# Patient Record
Sex: MALE | Race: WHITE | NOT HISPANIC OR LATINO | Employment: OTHER | URBAN - METROPOLITAN AREA
[De-identification: names, ages, dates, MRNs, and addresses within clinical notes are randomized per-mention and may not be internally consistent; named-entity substitution may affect disease eponyms.]

---

## 2018-01-25 ENCOUNTER — TELEPHONE (OUTPATIENT)
Dept: SMOKING CESSATION | Facility: CLINIC | Age: 78
End: 2018-01-25

## 2018-01-25 NOTE — TELEPHONE ENCOUNTER
Called patient to offer services through [facility]. Pt states he is not interested.

## 2018-04-25 PROBLEM — I25.2 HX OF MYOCARDIAL INFARCTION: Status: ACTIVE | Noted: 2018-04-25

## 2019-09-23 ENCOUNTER — TELEPHONE (OUTPATIENT)
Dept: VASCULAR SURGERY | Facility: CLINIC | Age: 79
End: 2019-09-23

## 2019-09-23 DIAGNOSIS — I71.40 ABDOMINAL AORTIC ANEURYSM (AAA) WITHOUT RUPTURE: Primary | ICD-10-CM

## 2019-09-23 NOTE — TELEPHONE ENCOUNTER
"Spoke with Mr Whitaker states " he will call me back tomorrow to reschedule because 10/9/2019 appointment is not a good day for him."  "

## 2019-09-23 NOTE — TELEPHONE ENCOUNTER
----- Message from Dianne Guillen sent at 9/23/2019  2:51 PM CDT -----  Pt calling to reschedule his appts.    [phone]

## 2019-09-24 ENCOUNTER — TELEPHONE (OUTPATIENT)
Dept: VASCULAR SURGERY | Facility: CLINIC | Age: 79
End: 2019-09-24

## 2019-09-24 NOTE — TELEPHONE ENCOUNTER
Spoke with patient addressed previous message. Patient will keep original schedule appointment on 10/9/2019.

## 2019-09-24 NOTE — TELEPHONE ENCOUNTER
----- Message from Dianne Guillen sent at 9/24/2019  8:51 AM CDT -----  Pt calling to reschedule his appts.    [phone]

## 2019-09-24 NOTE — TELEPHONE ENCOUNTER
----- Message from Nichole Friedman sent at 9/24/2019  8:49 AM CDT -----  Contact: self [phone]  Pt is calling to speak to Lisa regarding scheduling an appt    Contact: self [phone]

## 2019-10-09 ENCOUNTER — OFFICE VISIT (OUTPATIENT)
Dept: VASCULAR SURGERY | Facility: CLINIC | Age: 79
End: 2019-10-09
Payer: MEDICARE

## 2019-10-09 ENCOUNTER — HOSPITAL ENCOUNTER (OUTPATIENT)
Dept: VASCULAR SURGERY | Facility: CLINIC | Age: 79
Discharge: HOME | End: 2019-10-09
Attending: SURGERY
Payer: MEDICARE

## 2019-10-09 VITALS
WEIGHT: 137 LBS | DIASTOLIC BLOOD PRESSURE: 57 MMHG | SYSTOLIC BLOOD PRESSURE: 121 MMHG | HEIGHT: 70 IN | TEMPERATURE: 98 F | HEART RATE: 52 BPM | BODY MASS INDEX: 19.61 KG/M2

## 2019-10-09 DIAGNOSIS — I71.40 AAA (ABDOMINAL AORTIC ANEURYSM) WITHOUT RUPTURE: Primary | ICD-10-CM

## 2019-10-09 DIAGNOSIS — I71.40 ABDOMINAL AORTIC ANEURYSM (AAA) WITHOUT RUPTURE: ICD-10-CM

## 2019-10-09 PROCEDURE — 3078F DIAST BP <80 MM HG: CPT | Mod: CPTII,S$GLB,, | Performed by: SURGERY

## 2019-10-09 PROCEDURE — 3074F SYST BP LT 130 MM HG: CPT | Mod: CPTII,S$GLB,, | Performed by: SURGERY

## 2019-10-09 PROCEDURE — 99204 OFFICE O/P NEW MOD 45 MIN: CPT | Mod: S$GLB,,, | Performed by: SURGERY

## 2019-10-09 PROCEDURE — 99999 PR PBB SHADOW E&M-EST. PATIENT-LVL III: CPT | Mod: PBBFAC,,, | Performed by: SURGERY

## 2019-10-09 PROCEDURE — 93978 PR DUPLEX LARGE VESSEL(S),COMPLETE: ICD-10-PCS | Mod: S$GLB,,, | Performed by: SURGERY

## 2019-10-09 PROCEDURE — 1101F PR PT FALLS ASSESS DOC 0-1 FALLS W/OUT INJ PAST YR: ICD-10-PCS | Mod: CPTII,S$GLB,, | Performed by: SURGERY

## 2019-10-09 PROCEDURE — 1101F PT FALLS ASSESS-DOCD LE1/YR: CPT | Mod: CPTII,S$GLB,, | Performed by: SURGERY

## 2019-10-09 PROCEDURE — 3078F PR MOST RECENT DIASTOLIC BLOOD PRESSURE < 80 MM HG: ICD-10-PCS | Mod: CPTII,S$GLB,, | Performed by: SURGERY

## 2019-10-09 PROCEDURE — 99999 PR PBB SHADOW E&M-EST. PATIENT-LVL III: ICD-10-PCS | Mod: PBBFAC,,, | Performed by: SURGERY

## 2019-10-09 PROCEDURE — 99204 PR OFFICE/OUTPT VISIT, NEW, LEVL IV, 45-59 MIN: ICD-10-PCS | Mod: S$GLB,,, | Performed by: SURGERY

## 2019-10-09 PROCEDURE — 3074F PR MOST RECENT SYSTOLIC BLOOD PRESSURE < 130 MM HG: ICD-10-PCS | Mod: CPTII,S$GLB,, | Performed by: SURGERY

## 2019-10-09 PROCEDURE — 93978 VASCULAR STUDY: CPT | Mod: S$GLB,,, | Performed by: SURGERY

## 2019-10-09 NOTE — PROGRESS NOTES
Martell Whitaker  10/09/2019    HPI:  Patient is a 78 y.o. male with a h/o HTN, HLD, COPD, active tobacco use, CAD s/p stent placement, AAA s/p repair in 2007 ([facility], he does not recall surgeon and is unclear on any specifics about suregry) who is here today for evaluation of an aortic aneurysm found on routine screening. He is asymptomatic.     No significant claudication - but does note L > R L lateral thigh discomfort with walking  Able to walk 1 flight of stairs, no angina or CABALLERO    He takes ASA, plavix and a statin    MI about 30 years ago s/p PCI  No stroke  Tobacco use: Current smoker, 3/4-1ppd for 65 years  FHx for aneurysmal disease: negative    PMD is Dr YOEL Snyder, who asked me to see him    Past Medical History:   Diagnosis Date    AAA (abdominal aortic aneurysm) without rupture     pt with h/o aaa     COPD (chronic obstructive pulmonary disease)     Coronary artery disease     Enlarged prostate     History of bladder cancer     Hyperlipidemia     Hypertension     Lung abnormality     spot on lung under care of [facility]monologist at [facility] Dr lua    Myocardial infarct 1988    Urine retention      Past Surgical History:   Procedure Laterality Date    ABDOMINAL AORTIC ANEURYSM REPAIR  2007    BLADDER SURGERY      CORONARY STENT PLACEMENT      FRACTURE SURGERY      arm    PROSTATE SURGERY      TONSILLECTOMY       Family History   Problem Relation Age of Onset    Cancer Father         leukemia     Social History     Socioeconomic History    Marital status:      Spouse name: Not on file    Number of children: Not on file    Years of education: Not on file    Highest education level: Not on file   Occupational History    Occupation: retired    Social Needs    Financial resource strain: Not on file    Food insecurity:     Worry: Not on file     Inability: Not on file    Transportation needs:     Medical: Not on file     Non-medical: Not on file   Tobacco Use    Smoking status:  Current Every Day Smoker     Packs/day: 0.25     Years: 60.00     Pack years: 15.00     Types: Cigarettes    Smokeless tobacco: Never Used   Substance and Sexual Activity    Alcohol use: Yes     Alcohol/week: 1.0 standard drinks     Types: 1 Shots of liquor per week     Comment:  five drink per week     Drug use: No    Sexual activity: Yes     Partners: Female     Birth control/protection: None   Lifestyle    Physical activity:     Days per week: Not on file     Minutes per session: Not on file    Stress: Not on file   Relationships    Social connections:     Talks on phone: Not on file     Gets together: Not on file     Attends Denominational service: Not on file     Active member of club or organization: Not on file     Attends meetings of clubs or organizations: Not on file     Relationship status: Not on file   Other Topics Concern    Not on file   Social History Narrative    Not on file       Current Outpatient Medications:     ascorbic acid (VITAMIN C) 500 MG tablet, Take 500 mg by mouth once daily., Disp: , Rfl:     aspirin (ECOTRIN) 81 MG EC tablet, Take 81 mg by mouth once daily., Disp: , Rfl:     atenolol (TENORMIN) 25 MG tablet, TAKE 1 TABLET DAILY, Disp: 90 tablet, Rfl: 0    atorvastatin (LIPITOR) 40 MG tablet, TAKE 1 TABLET DAILY, Disp: 90 tablet, Rfl: 0    azelastine (ASTELIN) 137 mcg (0.1 %) nasal spray, INHALE TWO SPRAY IN EACH NOSTRIL TWICE DAILY, Disp: , Rfl: 5    clopidogrel (PLAVIX) 75 mg tablet, TAKE 1 TABLET DAILY, Disp: 90 tablet, Rfl: 0    ergocalciferol (VITAMIN D2) 50,000 unit Cap, Take 1 capsule (50,000 Units total) by mouth every 7 days., Disp: 12 capsule, Rfl: 0    finasteride (PROSCAR) 5 mg tablet, TAKE 1 TABLET DAILY, Disp: 90 tablet, Rfl: 0    megestrol (MEGACE) 40 MG Tab, Take 1 tablet (40 mg total) by mouth once daily., Disp: 90 tablet, Rfl: 0    neomycin-polymyxin-dexamethasone (DEXACINE) 3.5 mg/g-10,000 unit/g-0.1 % Oint, APPLY A SMALL AMOUNT INSIDE IN THE LEFT EYE  TWICE DAILY, Disp: , Rfl: 0    ofloxacin (OCUFLOX) 0.3 % ophthalmic solution, Place 1 drop into the left eye 4 (four) times daily., Disp: , Rfl: 2    tamsulosin (FLOMAX) 0.4 mg Cap, TAKE 1 CAPSULE DAILY, Disp: 90 capsule, Rfl: 0    nitroGLYCERIN (NITROSTAT) 0.4 MG SL tablet, Place 1 tablet (0.4 mg total) under the tongue every 5 (five) minutes as needed for Chest pain., Disp: 100 tablet, Rfl: 0    REVIEW OF SYSTEMS:  General: negative; ENT: negative; Allergy and Immunology: negative; Hematological and Lymphatic: Negative; Endocrine: negative; Respiratory: no cough, shortness of breath, or wheezing; Cardiovascular: no chest pain or dyspnea on exertion; Gastrointestinal: no abdominal pain/back, change in bowel habits, or bloody stools; Genito-Urinary: no dysuria, trouble voiding, or hematuria; Musculoskeletal: negative  Neurological: no TIA or stroke symptoms; Psychiatric: no nervousness, anxiety or depression.    PHYSICAL EXAM:   Right Arm BP - Sittin/57 (10/09/19 0933)  Left Arm BP - Sittin/57 (10/09/19 0933)  Pulse: (!) 52  Temp: 97.7 °F (36.5 °C)      General appearance:  Alert, well-appearing, and in no distress.  Oriented to person, place, and time   Neurological: Normal speech, no focal findings noted; CN II - XII grossly intact           Musculoskeletal: Digits/nail without cyanosis/clubbing.  Normal muscle strength/tone.                 Neck: Supple, no significant adenopathy; thyroid is not enlarged                  No carotid bruit can be auscultated                Chest:  Clear to auscultation, no wheezes, rales or rhonchi, symmetric air entry     No use of accessory muscles             Cardiac: Normal rate and regular rhythm, S1 and S2 normal; PMI non-displaced          Abdomen: Soft, nontender, nondistended, no masses or organomegaly     No rebound tenderness noted; bowel sounds normal     Pulsatile aortic mass is not palpable.     No groin adenopathy      Extremities:   Weak L > R 1+   femoral pulses bilaterally     No popliteal pulses     1+ L PT pedal pulses palpable.     No pedal edema     No ulcerations    LAB RESULTS:  Lab Results   Component Value Date    K 4.3 09/09/2019    K 3.7 03/04/2019    K 3.7 09/27/2018    CREATININE 1.02 09/09/2019    CREATININE 1.0 03/04/2019    CREATININE 1.0 09/27/2018     Lab Results   Component Value Date    WBC 6.4 09/09/2019    WBC 7.30 03/04/2019    WBC 6.70 09/27/2018    HCT 44.1 09/09/2019    HCT 45.9 03/04/2019    HCT 48.3 09/27/2018     09/09/2019     03/04/2019     09/27/2018     No results found for: HGBA1C  IMAGING:  AAA U/S:  Abdominal aorta: 3.4cm  Right ABHINAV:    Left ABHINAV:    IMP/PLAN:  78 y.o. male with h/o HTN, HLD, COPD, active tobacco use, CAD s/p stent placement, AAA s/p repair in 2007 ([facility], he does not recall - has 3.2 cm aorta that by u/s appears to have shrunk well.  Does need further vascular w/u and imaging    Would obtain: CTA chest/abd/pelvis to r/o thoracic aneurysm, evaluate abdominal aorta/previous stent graft further  Check carotid u/s, popliteal u/s and ABIs  Cont ASA 81 po qd; statin  Tobacco cessation important but he is unwilling (tried chantix in past; may try again).  F/u after above    Cristiano Grande MD FACS  Vascular/Endovascular Surgery    We discussed smoking cessation for greater than 10 minutes as well as the impact that continued smoking can have on the progression of Vascular disease. This discussion included the use of medications, patches, nicotine gum, and lifestyle modifications.

## 2019-10-09 NOTE — LETTER
October 9, 2019      Emelia Snyder MD  [address] [place] [address]           [facility] - Vascular Surgery  [address]  [facility] [address]  Phone: [phone]  Fax: [phone]          Patient: Martell Whitaker   MR Number: [id number]   YOB: 1940   Date of Visit: 10/9/2019       Dear Dr. Emelia Snyder:    Thank you for referring Martell Whitakre to me for evaluation. Attached you will find relevant portions of my assessment and plan of care.    If you have questions, please do not hesitate to call me. I look forward to following Martell Whitaker along with you.    Sincerely,    Cristiano Grande MD    Enclosure  CC:  No Recipients    If you would like to receive this communication electronically, please contact [email or web address] or [phone] to request more information on [employer] access.    For providers and/or their staff who would like to refer a patient to [facility], please contact us through our one-stop-shop provider referral line, [facility], at [phone].    If you feel you have received this communication in error or would no longer like to receive these types of communications, please e-mail [email or web address]

## 2019-10-11 DIAGNOSIS — I73.9 PVD (PERIPHERAL VASCULAR DISEASE): ICD-10-CM

## 2019-10-11 DIAGNOSIS — I71.40 AAA (ABDOMINAL AORTIC ANEURYSM) WITHOUT RUPTURE: ICD-10-CM

## 2019-10-11 DIAGNOSIS — I65.23 CAROTID STENOSIS, ASYMPTOMATIC, BILATERAL: Primary | ICD-10-CM

## 2019-11-05 ENCOUNTER — TELEPHONE (OUTPATIENT)
Dept: VASCULAR SURGERY | Facility: CLINIC | Age: 79
End: 2019-11-05

## 2019-11-05 NOTE — TELEPHONE ENCOUNTER
"Spoke with pt about changing his appt and he stated "I'll keep it like it is.It's usually light at 6:30 since the time changed".Pt given a call back number [phone] if he changes his mind later.Pt verbalizes understanding.   "

## 2019-11-05 NOTE — TELEPHONE ENCOUNTER
----- Message from Albert Ruiz sent at 11/4/2019  9:37 AM CST -----  Contact: Pt  Type:  Needs Medical Advice    Who Called: The Pt states that due to day light savings time, he won't be able to start his appt at 7:30 am on 1/06/2020.  He states that it will still be dark at that time and he can't see to drive when it's dark because of macular degeneration.  Please contact the Pt to move the appt a little later.    Best Call Back Number: [phone]

## 2020-01-06 ENCOUNTER — HOSPITAL ENCOUNTER (OUTPATIENT)
Dept: VASCULAR SURGERY | Facility: CLINIC | Age: 80
Discharge: HOME | End: 2020-01-06
Attending: SURGERY
Payer: MEDICARE

## 2020-01-06 ENCOUNTER — HOSPITAL ENCOUNTER (OUTPATIENT)
Dept: RADIOLOGY | Facility: HOSPITAL | Age: 80
Discharge: HOME | End: 2020-01-06
Attending: SURGERY
Payer: MEDICARE

## 2020-01-06 DIAGNOSIS — I73.9 PVD (PERIPHERAL VASCULAR DISEASE): ICD-10-CM

## 2020-01-06 DIAGNOSIS — I71.40 AAA (ABDOMINAL AORTIC ANEURYSM) WITHOUT RUPTURE: ICD-10-CM

## 2020-01-06 DIAGNOSIS — I65.23 CAROTID STENOSIS, ASYMPTOMATIC, BILATERAL: ICD-10-CM

## 2020-01-06 LAB
CREAT SERPL-MCNC: 1.1 MG/DL (ref 0.5–1.4)
SAMPLE: NORMAL

## 2020-01-06 PROCEDURE — 93880 PR DUPLEX SCAN EXTRACRANIAL,BILAT: ICD-10-PCS | Mod: S$GLB,,, | Performed by: SURGERY

## 2020-01-06 PROCEDURE — 74174 CTA ABD&PLVS W/CONTRAST: CPT | Mod: 26,,, | Performed by: RADIOLOGY

## 2020-01-06 PROCEDURE — 93880 EXTRACRANIAL BILAT STUDY: CPT | Mod: S$GLB,,, | Performed by: SURGERY

## 2020-01-06 PROCEDURE — 93925 LOWER EXTREMITY STUDY: CPT | Mod: 52,S$GLB,, | Performed by: SURGERY

## 2020-01-06 PROCEDURE — 71275 CT ANGIOGRAPHY CHEST: CPT | Mod: TC

## 2020-01-06 PROCEDURE — 25500020 PHARM REV CODE 255: Performed by: SURGERY

## 2020-01-06 PROCEDURE — 71275 CT ANGIOGRAPHY CHEST: CPT | Mod: 26,,, | Performed by: RADIOLOGY

## 2020-01-06 PROCEDURE — 74174 CTA CHEST ABDOMEN PELVIS: ICD-10-PCS | Mod: 26,,, | Performed by: RADIOLOGY

## 2020-01-06 PROCEDURE — 71275 CTA CHEST ABDOMEN PELVIS: ICD-10-PCS | Mod: 26,,, | Performed by: RADIOLOGY

## 2020-01-06 PROCEDURE — 93923 UPR/LXTR ART STDY 3+ LVLS: CPT | Mod: S$GLB,,, | Performed by: SURGERY

## 2020-01-06 PROCEDURE — 93925 PR DUPLEX LO EXTREM ART BILAT: ICD-10-PCS | Mod: 52,S$GLB,, | Performed by: SURGERY

## 2020-01-06 PROCEDURE — 93923 PR NON-INVASIVE PHYSIOLOGIC STUDY EXTREMITY 3 LEVELS: ICD-10-PCS | Mod: S$GLB,,, | Performed by: SURGERY

## 2020-01-06 RX ADMIN — IOHEXOL 100 ML: 350 INJECTION, SOLUTION INTRAVENOUS at 08:01

## 2020-01-15 ENCOUNTER — OFFICE VISIT (OUTPATIENT)
Dept: VASCULAR SURGERY | Facility: CLINIC | Age: 80
End: 2020-01-15
Payer: MEDICARE

## 2020-01-15 VITALS
SYSTOLIC BLOOD PRESSURE: 124 MMHG | DIASTOLIC BLOOD PRESSURE: 60 MMHG | TEMPERATURE: 98 F | HEART RATE: 55 BPM | HEIGHT: 70 IN | WEIGHT: 143.31 LBS | BODY MASS INDEX: 20.52 KG/M2

## 2020-01-15 DIAGNOSIS — I71.40 ABDOMINAL AORTIC ANEURYSM (AAA) WITHOUT RUPTURE: ICD-10-CM

## 2020-01-15 DIAGNOSIS — I71.40 AAA (ABDOMINAL AORTIC ANEURYSM) WITHOUT RUPTURE: Primary | ICD-10-CM

## 2020-01-15 PROCEDURE — 3078F PR MOST RECENT DIASTOLIC BLOOD PRESSURE < 80 MM HG: ICD-10-PCS | Mod: CPTII,S$GLB,, | Performed by: SURGERY

## 2020-01-15 PROCEDURE — 99407 BEHAV CHNG SMOKING > 10 MIN: CPT | Mod: S$GLB,,, | Performed by: SURGERY

## 2020-01-15 PROCEDURE — 99215 OFFICE O/P EST HI 40 MIN: CPT | Mod: 25,S$GLB,, | Performed by: SURGERY

## 2020-01-15 PROCEDURE — 99999 PR PBB SHADOW E&M-EST. PATIENT-LVL III: CPT | Mod: PBBFAC,,, | Performed by: SURGERY

## 2020-01-15 PROCEDURE — 3074F PR MOST RECENT SYSTOLIC BLOOD PRESSURE < 130 MM HG: ICD-10-PCS | Mod: CPTII,S$GLB,, | Performed by: SURGERY

## 2020-01-15 PROCEDURE — 99407 PR TOBACCO USE CESSATION INTENSIVE >10 MINUTES: ICD-10-PCS | Mod: S$GLB,,, | Performed by: SURGERY

## 2020-01-15 PROCEDURE — 1101F PR PT FALLS ASSESS DOC 0-1 FALLS W/OUT INJ PAST YR: ICD-10-PCS | Mod: CPTII,S$GLB,, | Performed by: SURGERY

## 2020-01-15 PROCEDURE — 99215 PR OFFICE/OUTPT VISIT, EST, LEVL V, 40-54 MIN: ICD-10-PCS | Mod: 25,S$GLB,, | Performed by: SURGERY

## 2020-01-15 PROCEDURE — 99999 PR PBB SHADOW E&M-EST. PATIENT-LVL III: ICD-10-PCS | Mod: PBBFAC,,, | Performed by: SURGERY

## 2020-01-15 PROCEDURE — 3074F SYST BP LT 130 MM HG: CPT | Mod: CPTII,S$GLB,, | Performed by: SURGERY

## 2020-01-15 PROCEDURE — 1159F MED LIST DOCD IN RCRD: CPT | Mod: S$GLB,,, | Performed by: SURGERY

## 2020-01-15 PROCEDURE — 1101F PT FALLS ASSESS-DOCD LE1/YR: CPT | Mod: CPTII,S$GLB,, | Performed by: SURGERY

## 2020-01-15 PROCEDURE — 1159F PR MEDICATION LIST DOCUMENTED IN MEDICAL RECORD: ICD-10-PCS | Mod: S$GLB,,, | Performed by: SURGERY

## 2020-01-15 PROCEDURE — 3078F DIAST BP <80 MM HG: CPT | Mod: CPTII,S$GLB,, | Performed by: SURGERY

## 2020-01-15 PROCEDURE — 1126F PR PAIN SEVERITY QUANTIFIED, NO PAIN PRESENT: ICD-10-PCS | Mod: S$GLB,,, | Performed by: SURGERY

## 2020-01-15 PROCEDURE — 1126F AMNT PAIN NOTED NONE PRSNT: CPT | Mod: S$GLB,,, | Performed by: SURGERY

## 2020-01-15 NOTE — PROGRESS NOTES
Martell Sherman  01/15/2020    HPI:  Patient is a 79 y.o. male with a h/o HTN, HLD, COPD, active tobacco use, CAD s/p stent placement, AAA s/p repair in 2007 ([facility], he does not recall surgeon and is unclear on any specifics about suregry) who is here today for evaluation of an aortic aneurysm found on routine screening. He is asymptomatic.     No significant claudication - but does note L > R L lateral thigh discomfort with walking  Able to walk 1 flight of stairs, no angina or CABALLERO    He takes ASA, plavix and a statin    MI about 30 years ago s/p PCI  No stroke  Tobacco use: Current smoker, 3/4-1ppd for 65 years  FHx for aneurysmal disease: negative    PMD is Dr YOEL Snyder, who asked me to see him    Past Medical History:   Diagnosis Date    AAA (abdominal aortic aneurysm) without rupture     pt with h/o aaa     COPD (chronic obstructive pulmonary disease)     Coronary artery disease     Enlarged prostate     History of bladder cancer     Hyperlipidemia     Hypertension     Lung abnormality     spot on lung under care of pulmonologist at [facility] Dr lua    Myocardial infarct 1988    Urine retention      Past Surgical History:   Procedure Laterality Date    ABDOMINAL AORTIC ANEURYSM REPAIR  2007    BLADDER SURGERY      CORONARY STENT PLACEMENT      FRACTURE SURGERY      arm    PROSTATE SURGERY      TONSILLECTOMY       Family History   Problem Relation Age of Onset    Cancer Father         leukemia     Social History     Socioeconomic History    Marital status:      Spouse name: Not on file    Number of children: Not on file    Years of education: Not on file    Highest education level: Not on file   Occupational History    Occupation: retired    Social Needs    Financial resource strain: Not on file    Food insecurity:     Worry: Not on file     Inability: Not on file    Transportation needs:     Medical: Not on file     Non-medical: Not on file   Tobacco Use    Smoking status:  Current Every Day Smoker     Packs/day: 0.25     Years: 60.00     Pack years: 15.00     Types: Cigarettes    Smokeless tobacco: Never Used   Substance and Sexual Activity    Alcohol use: Yes     Alcohol/week: 1.0 standard drinks     Types: 1 Shots of liquor per week     Comment:  five drink per week     Drug use: No    Sexual activity: Yes     Partners: Female     Birth control/protection: None   Lifestyle    Physical activity:     Days per week: Not on file     Minutes per session: Not on file    Stress: Not on file   Relationships    Social connections:     Talks on phone: Not on file     Gets together: Not on file     Attends Latter-day service: Not on file     Active member of club or organization: Not on file     Attends meetings of clubs or organizations: Not on file     Relationship status: Not on file   Other Topics Concern    Not on file   Social History Narrative    Not on file       Current Outpatient Medications:     ascorbic acid (VITAMIN C) 500 MG tablet, Take 500 mg by mouth once daily., Disp: , Rfl:     aspirin (ECOTRIN) 81 MG EC tablet, Take 81 mg by mouth once daily., Disp: , Rfl:     atenolol (TENORMIN) 25 MG tablet, Take 1 tablet (25 mg total) by mouth once daily., Disp: 90 tablet, Rfl: 0    atorvastatin (LIPITOR) 40 MG tablet, Take 1 tablet (40 mg total) by mouth once daily., Disp: 90 tablet, Rfl: 0    clopidogrel (PLAVIX) 75 mg tablet, Take 1 tablet (75 mg total) by mouth once daily., Disp: 90 tablet, Rfl: 0    ergocalciferol (VITAMIN D2) 50,000 unit Cap, Take 1 capsule (50,000 Units total) by mouth every 7 days., Disp: 12 capsule, Rfl: 0    finasteride (PROSCAR) 5 mg tablet, Take 1 tablet (5 mg total) by mouth once daily., Disp: 90 tablet, Rfl: 0    FLUZONE HIGH-DOSE 2019-20, PF, 180 mcg/0.5 mL Syrg, GIVEN AT PHARMACY, Disp: , Rfl: 0    ketorolac 0.5% (ACULAR) 0.5 % Drop, INSTILL ONE DROP IN THE RIGHT EYE FOUR TIMES DAILY, Disp: , Rfl: 2    megestrol (MEGACE) 40 MG Tab, Take  1 tablet (40 mg total) by mouth once daily., Disp: 90 tablet, Rfl: 0    prednisoLONE acetate (PRED FORTE) 1 % DrpS, INSTILL1 DROP IN THE RIGHT EYE FOUR TIMES DAILY, Disp: , Rfl: 2    tamsulosin (FLOMAX) 0.4 mg Cap, Take 1 capsule (0.4 mg total) by mouth once daily., Disp: 90 capsule, Rfl: 0    nitroGLYCERIN (NITROSTAT) 0.4 MG SL tablet, Place 1 tablet (0.4 mg total) under the tongue every 5 (five) minutes as needed for Chest pain., Disp: 100 tablet, Rfl: 0    REVIEW OF SYSTEMS:  General: negative; ENT: negative; Allergy and Immunology: negative; Hematological and Lymphatic: Negative; Endocrine: negative; Respiratory: no cough, shortness of breath, or wheezing; Cardiovascular: no chest pain or dyspnea on exertion; Gastrointestinal: no abdominal pain/back, change in bowel habits, or bloody stools; Genito-Urinary: no dysuria, trouble voiding, or hematuria; Musculoskeletal: negative  Neurological: no TIA or stroke symptoms; Psychiatric: no nervousness, anxiety or depression.    PHYSICAL EXAM:   Right Arm BP - Sittin/60 (01/15/20 1054)  Left Arm BP - Sittin/65 (01/15/20 1054)  Pulse: (!) 55  Temp: 98 °F (36.7 °C)      General appearance:  Alert, well-appearing, and in no distress.  Oriented to person, place, and time   Neurological: Normal speech, no focal findings noted; CN II - XII grossly intact           Musculoskeletal: Digits/nail without cyanosis/clubbing.  Normal muscle strength/tone.                 Neck: Supple, no significant adenopathy; thyroid is not enlarged                  No carotid bruit can be auscultated                Chest:  Clear to auscultation, no wheezes, rales or rhonchi, symmetric air entry     No use of accessory muscles             Cardiac: Normal rate and regular rhythm, S1 and S2 normal; PMI non-displaced          Abdomen: Soft, nontender, nondistended, no masses or organomegaly     No rebound tenderness noted; bowel sounds normal     Pulsatile aortic mass is not  palpable.     No groin adenopathy      Extremities:   Weak L > R 1+  femoral pulses bilaterally     No popliteal pulses     1+ L PT pedal pulses palpable.     No pedal edema     No ulcerations    LAB RESULTS:  Lab Results   Component Value Date    K 4.3 09/09/2019    K 3.7 03/04/2019    K 3.7 09/27/2018    CREATININE 1.02 09/09/2019    CREATININE 1.0 03/04/2019    CREATININE 1.0 09/27/2018     Lab Results   Component Value Date    WBC 6.4 09/09/2019    WBC 7.30 03/04/2019    WBC 6.70 09/27/2018    HCT 44.1 09/09/2019    HCT 45.9 03/04/2019    HCT 48.3 09/27/2018     09/09/2019     03/04/2019     09/27/2018     No results found for: HGBA1C  IMAGING:  CTA chest, abd, pelvis:  No thoracic aneurysm  AAA - < 3cm; stent graft (likely Excluder) in place    Carotid u/s: <39% stenoses x2  Popliteal u/s: no aneurysms    ABIs  R 1.01  L 1.07    IMP/PLAN:  79 y.o. male with h/o HTN, HLD, COPD, active tobacco use, CAD s/p stent placement, AAA s/p repair in 2007 ([facility], he does not recall)  Cont ASA 81 po qd; statin  Tobacco cessation important but he is unwilling (tried chantix in past; may try again).  F/u 2 yrs Aortic u/s    Cristiano Grande MD FACS  Vascular/Endovascular Surgery    We discussed smoking cessation for greater than 10 minutes as well as the impact that continued smoking can have on the progression of Vascular disease. This discussion included the use of medications, patches, nicotine gum, and lifestyle modifications.

## 2021-05-10 ENCOUNTER — TELEPHONE (OUTPATIENT)
Dept: SURGERY | Facility: CLINIC | Age: 81
End: 2021-05-10

## 2021-05-11 ENCOUNTER — OFFICE VISIT (OUTPATIENT)
Dept: SURGERY | Facility: CLINIC | Age: 81
End: 2021-05-11
Payer: MEDICARE

## 2021-05-11 VITALS
BODY MASS INDEX: 19.57 KG/M2 | WEIGHT: 136.69 LBS | RESPIRATION RATE: 18 BRPM | OXYGEN SATURATION: 97 % | DIASTOLIC BLOOD PRESSURE: 71 MMHG | HEIGHT: 70 IN | SYSTOLIC BLOOD PRESSURE: 118 MMHG | HEART RATE: 56 BPM

## 2021-05-11 DIAGNOSIS — K40.01 BILATERAL RECURRENT INGUINAL HERNIA WITH OBSTRUCTION AND WITHOUT GANGRENE: ICD-10-CM

## 2021-05-11 PROCEDURE — 3288F PR FALLS RISK ASSESSMENT DOCUMENTED: ICD-10-PCS | Mod: CPTII,S$GLB,, | Performed by: SURGERY

## 2021-05-11 PROCEDURE — 99999 PR PBB SHADOW E&M-EST. PATIENT-LVL IV: ICD-10-PCS | Mod: PBBFAC,,, | Performed by: SURGERY

## 2021-05-11 PROCEDURE — 1157F ADVNC CARE PLAN IN RCRD: CPT | Mod: S$GLB,,, | Performed by: SURGERY

## 2021-05-11 PROCEDURE — 99203 PR OFFICE/OUTPT VISIT, NEW, LEVL III, 30-44 MIN: ICD-10-PCS | Mod: S$GLB,,, | Performed by: SURGERY

## 2021-05-11 PROCEDURE — 1157F PR ADVANCE CARE PLAN OR EQUIV PRESENT IN MEDICAL RECORD: ICD-10-PCS | Mod: S$GLB,,, | Performed by: SURGERY

## 2021-05-11 PROCEDURE — 1126F PR PAIN SEVERITY QUANTIFIED, NO PAIN PRESENT: ICD-10-PCS | Mod: S$GLB,,, | Performed by: SURGERY

## 2021-05-11 PROCEDURE — 1101F PR PT FALLS ASSESS DOC 0-1 FALLS W/OUT INJ PAST YR: ICD-10-PCS | Mod: CPTII,S$GLB,, | Performed by: SURGERY

## 2021-05-11 PROCEDURE — 3288F FALL RISK ASSESSMENT DOCD: CPT | Mod: CPTII,S$GLB,, | Performed by: SURGERY

## 2021-05-11 PROCEDURE — 99999 PR PBB SHADOW E&M-EST. PATIENT-LVL IV: CPT | Mod: PBBFAC,,, | Performed by: SURGERY

## 2021-05-11 PROCEDURE — 99203 OFFICE O/P NEW LOW 30 MIN: CPT | Mod: S$GLB,,, | Performed by: SURGERY

## 2021-05-11 PROCEDURE — 1159F MED LIST DOCD IN RCRD: CPT | Mod: S$GLB,,, | Performed by: SURGERY

## 2021-05-11 PROCEDURE — 1126F AMNT PAIN NOTED NONE PRSNT: CPT | Mod: S$GLB,,, | Performed by: SURGERY

## 2021-05-11 PROCEDURE — 1101F PT FALLS ASSESS-DOCD LE1/YR: CPT | Mod: CPTII,S$GLB,, | Performed by: SURGERY

## 2021-05-11 PROCEDURE — 1159F PR MEDICATION LIST DOCUMENTED IN MEDICAL RECORD: ICD-10-PCS | Mod: S$GLB,,, | Performed by: SURGERY

## 2021-05-11 RX ORDER — CLOTRIMAZOLE AND BETAMETHASONE DIPROPIONATE 10; .64 MG/G; MG/G
CREAM TOPICAL
COMMUNITY
Start: 2021-04-05 | End: 2022-03-31

## 2021-06-10 ENCOUNTER — TELEPHONE (OUTPATIENT)
Dept: UROLOGY | Facility: CLINIC | Age: 81
End: 2021-06-10

## 2021-06-23 ENCOUNTER — TELEPHONE (OUTPATIENT)
Dept: PULMONOLOGY | Facility: CLINIC | Age: 81
End: 2021-06-23

## 2021-06-29 ENCOUNTER — TELEPHONE (OUTPATIENT)
Dept: PULMONOLOGY | Facility: CLINIC | Age: 81
End: 2021-06-29

## 2021-07-13 ENCOUNTER — TELEPHONE (OUTPATIENT)
Dept: PULMONOLOGY | Facility: CLINIC | Age: 81
End: 2021-07-13

## 2021-07-14 ENCOUNTER — OFFICE VISIT (OUTPATIENT)
Dept: PULMONOLOGY | Facility: CLINIC | Age: 81
End: 2021-07-14
Payer: MEDICARE

## 2021-07-14 VITALS
WEIGHT: 137.56 LBS | OXYGEN SATURATION: 97 % | HEIGHT: 70 IN | DIASTOLIC BLOOD PRESSURE: 72 MMHG | HEART RATE: 61 BPM | BODY MASS INDEX: 19.69 KG/M2 | SYSTOLIC BLOOD PRESSURE: 159 MMHG

## 2021-07-14 DIAGNOSIS — R04.2 HEMOPTYSIS: ICD-10-CM

## 2021-07-14 DIAGNOSIS — J43.2 CENTRILOBULAR EMPHYSEMA: ICD-10-CM

## 2021-07-14 DIAGNOSIS — R91.1 PULMONARY NODULE: ICD-10-CM

## 2021-07-14 PROCEDURE — 1157F PR ADVANCE CARE PLAN OR EQUIV PRESENT IN MEDICAL RECORD: ICD-10-PCS | Mod: S$GLB,,, | Performed by: NURSE PRACTITIONER

## 2021-07-14 PROCEDURE — 1101F PR PT FALLS ASSESS DOC 0-1 FALLS W/OUT INJ PAST YR: ICD-10-PCS | Mod: CPTII,S$GLB,, | Performed by: NURSE PRACTITIONER

## 2021-07-14 PROCEDURE — 3288F FALL RISK ASSESSMENT DOCD: CPT | Mod: CPTII,S$GLB,, | Performed by: NURSE PRACTITIONER

## 2021-07-14 PROCEDURE — 1126F AMNT PAIN NOTED NONE PRSNT: CPT | Mod: S$GLB,,, | Performed by: NURSE PRACTITIONER

## 2021-07-14 PROCEDURE — 99999 PR PBB SHADOW E&M-EST. PATIENT-LVL III: CPT | Mod: PBBFAC,,, | Performed by: NURSE PRACTITIONER

## 2021-07-14 PROCEDURE — 3288F PR FALLS RISK ASSESSMENT DOCUMENTED: ICD-10-PCS | Mod: CPTII,S$GLB,, | Performed by: NURSE PRACTITIONER

## 2021-07-14 PROCEDURE — 99999 PR PBB SHADOW E&M-EST. PATIENT-LVL III: ICD-10-PCS | Mod: PBBFAC,,, | Performed by: NURSE PRACTITIONER

## 2021-07-14 PROCEDURE — 99204 OFFICE O/P NEW MOD 45 MIN: CPT | Mod: S$GLB,,, | Performed by: NURSE PRACTITIONER

## 2021-07-14 PROCEDURE — 1101F PT FALLS ASSESS-DOCD LE1/YR: CPT | Mod: CPTII,S$GLB,, | Performed by: NURSE PRACTITIONER

## 2021-07-14 PROCEDURE — 99204 PR OFFICE/OUTPT VISIT, NEW, LEVL IV, 45-59 MIN: ICD-10-PCS | Mod: S$GLB,,, | Performed by: NURSE PRACTITIONER

## 2021-07-14 PROCEDURE — 1126F PR PAIN SEVERITY QUANTIFIED, NO PAIN PRESENT: ICD-10-PCS | Mod: S$GLB,,, | Performed by: NURSE PRACTITIONER

## 2021-07-14 PROCEDURE — 1157F ADVNC CARE PLAN IN RCRD: CPT | Mod: S$GLB,,, | Performed by: NURSE PRACTITIONER

## 2021-07-16 ENCOUNTER — TELEPHONE (OUTPATIENT)
Dept: PULMONOLOGY | Facility: CLINIC | Age: 81
End: 2021-07-16

## 2021-07-16 NOTE — TELEPHONE ENCOUNTER
Spoke with pt. He has soonest appointment available with Dr. Shelby on 8/25/2021, I added him to the cancellation list.  Informed pt. that if a pt. cancels he will get a message to his phone offering a sooner appointment, asked him to respond to message as soon as possible.  Pt. stated that he will continue antibiotics as ordered, asked pt. to call our office if he has any problems completing antibiotics.

## 2021-07-16 NOTE — TELEPHONE ENCOUNTER
----- Message from Chika Arroyo MA sent at 7/15/2021  5:04 PM CDT -----  Regarding: FW: pt advice  Contact: pt @ [phone]    ----- Message -----  From: Estephania Quiñonez MA  Sent: 7/15/2021   3:24 PM CDT  To: , #  Subject: FW: pt advice                                      ----- Message -----  From: Marlene Looney  Sent: 7/15/2021   2:26 PM CDT  To: Liberty Arrieta  Subject: pt advice                                        Pt calling to speak with someone in Dr. Holland's office regarding getting an appt for next week, says he was told by the ER doctor to see the doctor as soon as possible. Patient says he is aware of the 8/25 appt but wants to see Dr. Holland soon.  Please call.

## 2021-08-25 ENCOUNTER — OFFICE VISIT (OUTPATIENT)
Dept: PULMONOLOGY | Facility: CLINIC | Age: 81
End: 2021-08-25
Payer: MEDICARE

## 2021-08-25 VITALS
BODY MASS INDEX: 19.69 KG/M2 | RESPIRATION RATE: 16 BRPM | DIASTOLIC BLOOD PRESSURE: 60 MMHG | SYSTOLIC BLOOD PRESSURE: 128 MMHG | HEART RATE: 70 BPM | WEIGHT: 137.25 LBS

## 2021-08-25 DIAGNOSIS — R91.1 PULMONARY NODULE: Primary | ICD-10-CM

## 2021-08-25 DIAGNOSIS — J43.2 CENTRILOBULAR EMPHYSEMA: ICD-10-CM

## 2021-08-25 DIAGNOSIS — R04.2 HEMOPTYSIS: ICD-10-CM

## 2021-08-25 PROCEDURE — 1159F PR MEDICATION LIST DOCUMENTED IN MEDICAL RECORD: ICD-10-PCS | Mod: CPTII,S$GLB,, | Performed by: INTERNAL MEDICINE

## 2021-08-25 PROCEDURE — 99213 OFFICE O/P EST LOW 20 MIN: CPT | Mod: S$GLB,,, | Performed by: INTERNAL MEDICINE

## 2021-08-25 PROCEDURE — 99999 PR PBB SHADOW E&M-EST. PATIENT-LVL III: CPT | Mod: PBBFAC,,, | Performed by: INTERNAL MEDICINE

## 2021-08-25 PROCEDURE — 3288F PR FALLS RISK ASSESSMENT DOCUMENTED: ICD-10-PCS | Mod: CPTII,S$GLB,, | Performed by: INTERNAL MEDICINE

## 2021-08-25 PROCEDURE — 3288F FALL RISK ASSESSMENT DOCD: CPT | Mod: CPTII,S$GLB,, | Performed by: INTERNAL MEDICINE

## 2021-08-25 PROCEDURE — 3078F PR MOST RECENT DIASTOLIC BLOOD PRESSURE < 80 MM HG: ICD-10-PCS | Mod: CPTII,S$GLB,, | Performed by: INTERNAL MEDICINE

## 2021-08-25 PROCEDURE — 3074F PR MOST RECENT SYSTOLIC BLOOD PRESSURE < 130 MM HG: ICD-10-PCS | Mod: CPTII,S$GLB,, | Performed by: INTERNAL MEDICINE

## 2021-08-25 PROCEDURE — 1101F PT FALLS ASSESS-DOCD LE1/YR: CPT | Mod: CPTII,S$GLB,, | Performed by: INTERNAL MEDICINE

## 2021-08-25 PROCEDURE — 1157F PR ADVANCE CARE PLAN OR EQUIV PRESENT IN MEDICAL RECORD: ICD-10-PCS | Mod: CPTII,S$GLB,, | Performed by: INTERNAL MEDICINE

## 2021-08-25 PROCEDURE — 3078F DIAST BP <80 MM HG: CPT | Mod: CPTII,S$GLB,, | Performed by: INTERNAL MEDICINE

## 2021-08-25 PROCEDURE — 1159F MED LIST DOCD IN RCRD: CPT | Mod: CPTII,S$GLB,, | Performed by: INTERNAL MEDICINE

## 2021-08-25 PROCEDURE — 1101F PR PT FALLS ASSESS DOC 0-1 FALLS W/OUT INJ PAST YR: ICD-10-PCS | Mod: CPTII,S$GLB,, | Performed by: INTERNAL MEDICINE

## 2021-08-25 PROCEDURE — 3074F SYST BP LT 130 MM HG: CPT | Mod: CPTII,S$GLB,, | Performed by: INTERNAL MEDICINE

## 2021-08-25 PROCEDURE — 99213 PR OFFICE/OUTPT VISIT, EST, LEVL III, 20-29 MIN: ICD-10-PCS | Mod: S$GLB,,, | Performed by: INTERNAL MEDICINE

## 2021-08-25 PROCEDURE — 99999 PR PBB SHADOW E&M-EST. PATIENT-LVL III: ICD-10-PCS | Mod: PBBFAC,,, | Performed by: INTERNAL MEDICINE

## 2021-08-25 PROCEDURE — 1157F ADVNC CARE PLAN IN RCRD: CPT | Mod: CPTII,S$GLB,, | Performed by: INTERNAL MEDICINE

## 2021-09-22 PROBLEM — Z72.0 TOBACCO USER: Status: ACTIVE | Noted: 2021-09-22

## 2021-10-06 ENCOUNTER — TELEPHONE (OUTPATIENT)
Dept: UROLOGY | Facility: CLINIC | Age: 81
End: 2021-10-06

## 2021-10-07 ENCOUNTER — TELEPHONE (OUTPATIENT)
Dept: UROLOGY | Facility: CLINIC | Age: 81
End: 2021-10-07

## 2021-10-07 DIAGNOSIS — R30.0 DYSURIA: ICD-10-CM

## 2021-10-07 DIAGNOSIS — Z85.51 HISTORY OF BLADDER CANCER: Primary | ICD-10-CM

## 2021-10-11 ENCOUNTER — TELEPHONE (OUTPATIENT)
Dept: UROLOGY | Facility: CLINIC | Age: 81
End: 2021-10-11

## 2021-10-27 ENCOUNTER — OFFICE VISIT (OUTPATIENT)
Dept: CARDIOLOGY | Facility: CLINIC | Age: 81
End: 2021-10-27
Payer: MEDICARE

## 2021-10-27 VITALS
WEIGHT: 137.88 LBS | HEIGHT: 70 IN | DIASTOLIC BLOOD PRESSURE: 78 MMHG | SYSTOLIC BLOOD PRESSURE: 130 MMHG | OXYGEN SATURATION: 96 % | HEART RATE: 60 BPM | BODY MASS INDEX: 19.74 KG/M2

## 2021-10-27 DIAGNOSIS — E78.00 HYPERCHOLESTEREMIA: Chronic | ICD-10-CM

## 2021-10-27 DIAGNOSIS — R60.0 LOCALIZED EDEMA: ICD-10-CM

## 2021-10-27 DIAGNOSIS — I25.10 CORONARY ARTERY DISEASE INVOLVING NATIVE CORONARY ARTERY OF NATIVE HEART WITHOUT ANGINA PECTORIS: Primary | Chronic | ICD-10-CM

## 2021-10-27 DIAGNOSIS — Z72.0 TOBACCO USER: ICD-10-CM

## 2021-10-27 DIAGNOSIS — Z98.61 S/P PTCA (PERCUTANEOUS TRANSLUMINAL CORONARY ANGIOPLASTY): ICD-10-CM

## 2021-10-27 DIAGNOSIS — I71.40 ABDOMINAL AORTIC ANEURYSM (AAA) WITHOUT RUPTURE: ICD-10-CM

## 2021-10-27 DIAGNOSIS — I45.10 RBBB: ICD-10-CM

## 2021-10-27 DIAGNOSIS — I25.2 HX OF MYOCARDIAL INFARCTION: ICD-10-CM

## 2021-10-27 DIAGNOSIS — N40.0 ENLARGED PROSTATE: ICD-10-CM

## 2021-10-27 DIAGNOSIS — I73.9 PVD (PERIPHERAL VASCULAR DISEASE): ICD-10-CM

## 2021-10-27 DIAGNOSIS — I70.0 AORTIC ARCH ATHEROSCLEROSIS: ICD-10-CM

## 2021-10-27 DIAGNOSIS — R91.1 PULMONARY NODULE: ICD-10-CM

## 2021-10-27 DIAGNOSIS — Z85.51 HISTORY OF BLADDER CANCER: ICD-10-CM

## 2021-10-27 DIAGNOSIS — R06.09 DYSPNEA ON EXERTION: ICD-10-CM

## 2021-10-27 DIAGNOSIS — I65.23 CAROTID STENOSIS, ASYMPTOMATIC, BILATERAL: ICD-10-CM

## 2021-10-27 DIAGNOSIS — J43.2 CENTRILOBULAR EMPHYSEMA: ICD-10-CM

## 2021-10-27 DIAGNOSIS — Z95.828 HISTORY OF ENDOVASCULAR STENT GRAFT FOR ABDOMINAL AORTIC ANEURYSM (AAA): ICD-10-CM

## 2021-10-27 DIAGNOSIS — I10 PRIMARY HYPERTENSION: Chronic | ICD-10-CM

## 2021-10-27 PROCEDURE — 3075F PR MOST RECENT SYSTOLIC BLOOD PRESS GE 130-139MM HG: ICD-10-PCS | Mod: CPTII,S$GLB,, | Performed by: INTERNAL MEDICINE

## 2021-10-27 PROCEDURE — 3078F PR MOST RECENT DIASTOLIC BLOOD PRESSURE < 80 MM HG: ICD-10-PCS | Mod: CPTII,S$GLB,, | Performed by: INTERNAL MEDICINE

## 2021-10-27 PROCEDURE — 1101F PR PT FALLS ASSESS DOC 0-1 FALLS W/OUT INJ PAST YR: ICD-10-PCS | Mod: CPTII,S$GLB,, | Performed by: INTERNAL MEDICINE

## 2021-10-27 PROCEDURE — 1126F PR PAIN SEVERITY QUANTIFIED, NO PAIN PRESENT: ICD-10-PCS | Mod: CPTII,S$GLB,, | Performed by: INTERNAL MEDICINE

## 2021-10-27 PROCEDURE — 1157F ADVNC CARE PLAN IN RCRD: CPT | Mod: CPTII,S$GLB,, | Performed by: INTERNAL MEDICINE

## 2021-10-27 PROCEDURE — 1126F AMNT PAIN NOTED NONE PRSNT: CPT | Mod: CPTII,S$GLB,, | Performed by: INTERNAL MEDICINE

## 2021-10-27 PROCEDURE — 99999 PR PBB SHADOW E&M-EST. PATIENT-LVL III: CPT | Mod: PBBFAC,,, | Performed by: INTERNAL MEDICINE

## 2021-10-27 PROCEDURE — 99205 OFFICE O/P NEW HI 60 MIN: CPT | Mod: 25,S$GLB,, | Performed by: INTERNAL MEDICINE

## 2021-10-27 PROCEDURE — 93000 ELECTROCARDIOGRAM COMPLETE: CPT | Mod: S$GLB,,, | Performed by: INTERNAL MEDICINE

## 2021-10-27 PROCEDURE — 99999 PR PBB SHADOW E&M-EST. PATIENT-LVL III: ICD-10-PCS | Mod: PBBFAC,,, | Performed by: INTERNAL MEDICINE

## 2021-10-27 PROCEDURE — 1159F PR MEDICATION LIST DOCUMENTED IN MEDICAL RECORD: ICD-10-PCS | Mod: CPTII,S$GLB,, | Performed by: INTERNAL MEDICINE

## 2021-10-27 PROCEDURE — 3288F PR FALLS RISK ASSESSMENT DOCUMENTED: ICD-10-PCS | Mod: CPTII,S$GLB,, | Performed by: INTERNAL MEDICINE

## 2021-10-27 PROCEDURE — 3075F SYST BP GE 130 - 139MM HG: CPT | Mod: CPTII,S$GLB,, | Performed by: INTERNAL MEDICINE

## 2021-10-27 PROCEDURE — 3078F DIAST BP <80 MM HG: CPT | Mod: CPTII,S$GLB,, | Performed by: INTERNAL MEDICINE

## 2021-10-27 PROCEDURE — 99205 PR OFFICE/OUTPT VISIT, NEW, LEVL V, 60-74 MIN: ICD-10-PCS | Mod: 25,S$GLB,, | Performed by: INTERNAL MEDICINE

## 2021-10-27 PROCEDURE — 93000 EKG 12-LEAD: ICD-10-PCS | Mod: S$GLB,,, | Performed by: INTERNAL MEDICINE

## 2021-10-27 PROCEDURE — 1101F PT FALLS ASSESS-DOCD LE1/YR: CPT | Mod: CPTII,S$GLB,, | Performed by: INTERNAL MEDICINE

## 2021-10-27 PROCEDURE — 1160F PR REVIEW ALL MEDS BY PRESCRIBER/CLIN PHARMACIST DOCUMENTED: ICD-10-PCS | Mod: CPTII,S$GLB,, | Performed by: INTERNAL MEDICINE

## 2021-10-27 PROCEDURE — 1157F PR ADVANCE CARE PLAN OR EQUIV PRESENT IN MEDICAL RECORD: ICD-10-PCS | Mod: CPTII,S$GLB,, | Performed by: INTERNAL MEDICINE

## 2021-10-27 PROCEDURE — 3288F FALL RISK ASSESSMENT DOCD: CPT | Mod: CPTII,S$GLB,, | Performed by: INTERNAL MEDICINE

## 2021-10-27 PROCEDURE — 1159F MED LIST DOCD IN RCRD: CPT | Mod: CPTII,S$GLB,, | Performed by: INTERNAL MEDICINE

## 2021-10-27 PROCEDURE — 1160F RVW MEDS BY RX/DR IN RCRD: CPT | Mod: CPTII,S$GLB,, | Performed by: INTERNAL MEDICINE

## 2021-11-03 ENCOUNTER — TELEPHONE (OUTPATIENT)
Dept: CARDIOLOGY | Facility: CLINIC | Age: 81
End: 2021-11-03

## 2022-01-20 ENCOUNTER — TELEPHONE (OUTPATIENT)
Dept: VASCULAR SURGERY | Facility: CLINIC | Age: 82
End: 2022-01-20
Payer: MEDICARE

## 2022-01-20 DIAGNOSIS — I71.40 ABDOMINAL AORTIC ANEURYSM (AAA) WITHOUT RUPTURE: Primary | ICD-10-CM

## 2022-01-20 NOTE — TELEPHONE ENCOUNTER
Spoke with the pt and appt scheduled.  ----- Message from Kiesha Osborn sent at 1/20/2022  3:38 PM CST -----  Regarding: appt  Dr. Snyder is referring the attached patient to you for evaluation and treatment. (Referral is in Epic System)    We appreciate your assistance in the patient's care and look forward to your findings and recommendations.  Please contact patient to set up an appointment.  If we can be of any assistance, please contact the office.        Sincerely,                           Kiesha GARCIA MA

## 2022-02-24 ENCOUNTER — TELEPHONE (OUTPATIENT)
Dept: CARDIOLOGY | Facility: CLINIC | Age: 82
End: 2022-02-24
Payer: MEDICARE

## 2022-02-24 NOTE — TELEPHONE ENCOUNTER
Spoke with patient.  Scheduled follow up appointment with Dr Castellon.     ----- Message from Yamileth Velazco sent at 2/24/2022 10:50 AM CST -----  Name Of Caller: Linda     Provider Name: Poncho Castellon    Does patient feel the need to be seen today? NO     Relationship to the Pt?: PT    Contact Preference?: [phone]    What is the nature of the call?:Pt called and would like to speak to office directly has some questions

## 2022-03-07 ENCOUNTER — TELEPHONE (OUTPATIENT)
Dept: VASCULAR SURGERY | Facility: CLINIC | Age: 82
End: 2022-03-07
Payer: MEDICARE

## 2022-03-07 NOTE — TELEPHONE ENCOUNTER
Received a call from the pt who had questions about whether or not he needs to be seen due to his age.Pt informed per Dr Grande that he does need to f/u.If he can't be seen now he can reschedule in 1-3 months.Pt verbalized understanding of information received and opted to be seen in 2 months instead of next week.

## 2022-03-31 ENCOUNTER — OFFICE VISIT (OUTPATIENT)
Dept: CARDIOLOGY | Facility: CLINIC | Age: 82
End: 2022-03-31
Payer: MEDICARE

## 2022-03-31 VITALS
HEIGHT: 70 IN | HEART RATE: 55 BPM | SYSTOLIC BLOOD PRESSURE: 124 MMHG | WEIGHT: 139.69 LBS | BODY MASS INDEX: 20 KG/M2 | DIASTOLIC BLOOD PRESSURE: 61 MMHG | OXYGEN SATURATION: 98 %

## 2022-03-31 DIAGNOSIS — I45.10 RBBB: ICD-10-CM

## 2022-03-31 DIAGNOSIS — I71.40 ABDOMINAL AORTIC ANEURYSM (AAA) WITHOUT RUPTURE: ICD-10-CM

## 2022-03-31 DIAGNOSIS — N40.0 ENLARGED PROSTATE: ICD-10-CM

## 2022-03-31 DIAGNOSIS — I25.2 HX OF MYOCARDIAL INFARCTION: ICD-10-CM

## 2022-03-31 DIAGNOSIS — J43.2 CENTRILOBULAR EMPHYSEMA: ICD-10-CM

## 2022-03-31 DIAGNOSIS — I65.23 CAROTID STENOSIS, ASYMPTOMATIC, BILATERAL: ICD-10-CM

## 2022-03-31 DIAGNOSIS — E78.00 HYPERCHOLESTEREMIA: Chronic | ICD-10-CM

## 2022-03-31 DIAGNOSIS — R60.0 LOCALIZED EDEMA: ICD-10-CM

## 2022-03-31 DIAGNOSIS — Z95.828 HISTORY OF ENDOVASCULAR STENT GRAFT FOR ABDOMINAL AORTIC ANEURYSM (AAA): ICD-10-CM

## 2022-03-31 DIAGNOSIS — Z85.51 HISTORY OF BLADDER CANCER: ICD-10-CM

## 2022-03-31 DIAGNOSIS — R06.09 DYSPNEA ON EXERTION: ICD-10-CM

## 2022-03-31 DIAGNOSIS — Z98.61 S/P PTCA (PERCUTANEOUS TRANSLUMINAL CORONARY ANGIOPLASTY): ICD-10-CM

## 2022-03-31 DIAGNOSIS — I70.0 AORTIC ARCH ATHEROSCLEROSIS: ICD-10-CM

## 2022-03-31 DIAGNOSIS — Z72.0 TOBACCO USER: ICD-10-CM

## 2022-03-31 DIAGNOSIS — I73.9 PVD (PERIPHERAL VASCULAR DISEASE): ICD-10-CM

## 2022-03-31 DIAGNOSIS — I10 PRIMARY HYPERTENSION: Chronic | ICD-10-CM

## 2022-03-31 DIAGNOSIS — I25.10 CORONARY ARTERY DISEASE INVOLVING NATIVE CORONARY ARTERY OF NATIVE HEART WITHOUT ANGINA PECTORIS: Primary | Chronic | ICD-10-CM

## 2022-03-31 PROCEDURE — 99999 PR PBB SHADOW E&M-EST. PATIENT-LVL III: CPT | Mod: PBBFAC,,, | Performed by: INTERNAL MEDICINE

## 2022-03-31 PROCEDURE — 3074F SYST BP LT 130 MM HG: CPT | Mod: CPTII,S$GLB,, | Performed by: INTERNAL MEDICINE

## 2022-03-31 PROCEDURE — 99214 PR OFFICE/OUTPT VISIT, EST, LEVL IV, 30-39 MIN: ICD-10-PCS | Mod: S$GLB,,, | Performed by: INTERNAL MEDICINE

## 2022-03-31 PROCEDURE — 99999 PR PBB SHADOW E&M-EST. PATIENT-LVL III: ICD-10-PCS | Mod: PBBFAC,,, | Performed by: INTERNAL MEDICINE

## 2022-03-31 PROCEDURE — 1126F AMNT PAIN NOTED NONE PRSNT: CPT | Mod: CPTII,S$GLB,, | Performed by: INTERNAL MEDICINE

## 2022-03-31 PROCEDURE — 1101F PR PT FALLS ASSESS DOC 0-1 FALLS W/OUT INJ PAST YR: ICD-10-PCS | Mod: CPTII,S$GLB,, | Performed by: INTERNAL MEDICINE

## 2022-03-31 PROCEDURE — 1157F PR ADVANCE CARE PLAN OR EQUIV PRESENT IN MEDICAL RECORD: ICD-10-PCS | Mod: CPTII,S$GLB,, | Performed by: INTERNAL MEDICINE

## 2022-03-31 PROCEDURE — 1159F PR MEDICATION LIST DOCUMENTED IN MEDICAL RECORD: ICD-10-PCS | Mod: CPTII,S$GLB,, | Performed by: INTERNAL MEDICINE

## 2022-03-31 PROCEDURE — 3288F PR FALLS RISK ASSESSMENT DOCUMENTED: ICD-10-PCS | Mod: CPTII,S$GLB,, | Performed by: INTERNAL MEDICINE

## 2022-03-31 PROCEDURE — 3078F PR MOST RECENT DIASTOLIC BLOOD PRESSURE < 80 MM HG: ICD-10-PCS | Mod: CPTII,S$GLB,, | Performed by: INTERNAL MEDICINE

## 2022-03-31 PROCEDURE — 1126F PR PAIN SEVERITY QUANTIFIED, NO PAIN PRESENT: ICD-10-PCS | Mod: CPTII,S$GLB,, | Performed by: INTERNAL MEDICINE

## 2022-03-31 PROCEDURE — 3074F PR MOST RECENT SYSTOLIC BLOOD PRESSURE < 130 MM HG: ICD-10-PCS | Mod: CPTII,S$GLB,, | Performed by: INTERNAL MEDICINE

## 2022-03-31 PROCEDURE — 1101F PT FALLS ASSESS-DOCD LE1/YR: CPT | Mod: CPTII,S$GLB,, | Performed by: INTERNAL MEDICINE

## 2022-03-31 PROCEDURE — 93000 ELECTROCARDIOGRAM COMPLETE: CPT | Mod: S$GLB,,, | Performed by: INTERNAL MEDICINE

## 2022-03-31 PROCEDURE — 3078F DIAST BP <80 MM HG: CPT | Mod: CPTII,S$GLB,, | Performed by: INTERNAL MEDICINE

## 2022-03-31 PROCEDURE — 99214 OFFICE O/P EST MOD 30 MIN: CPT | Mod: S$GLB,,, | Performed by: INTERNAL MEDICINE

## 2022-03-31 PROCEDURE — 1157F ADVNC CARE PLAN IN RCRD: CPT | Mod: CPTII,S$GLB,, | Performed by: INTERNAL MEDICINE

## 2022-03-31 PROCEDURE — 1159F MED LIST DOCD IN RCRD: CPT | Mod: CPTII,S$GLB,, | Performed by: INTERNAL MEDICINE

## 2022-03-31 PROCEDURE — 93000 EKG 12-LEAD: ICD-10-PCS | Mod: S$GLB,,, | Performed by: INTERNAL MEDICINE

## 2022-03-31 PROCEDURE — 3288F FALL RISK ASSESSMENT DOCD: CPT | Mod: CPTII,S$GLB,, | Performed by: INTERNAL MEDICINE

## 2022-03-31 NOTE — PROGRESS NOTES
Subjective:      Patient ID: Martell Whitaker is a 81 y.o. male.    Chief Complaint: Follow-up    HPI:  Pt feels well    Walks a lot with less pain in legs    Chronically short of breath with strenuous exertion.    Overall less short of breath.    Pt can walk a mile without difficulty.      Review of Systems   Cardiovascular: Positive for claudication and dyspnea on exertion. Negative for chest pain, irregular heartbeat, leg swelling, near-syncope, orthopnea, palpitations and syncope.      No sores on feet      Past Medical History:   Diagnosis Date    AAA (abdominal aortic aneurysm) without rupture     pt with h/o aaa     Acute coronary syndrome     Cancer     bladder    Carotid artery occlusion     COPD (chronic obstructive pulmonary disease)     Coronary artery disease     Enlarged prostate     History of bladder cancer     Hyperlipidemia     Hypertension     Lung abnormality     spot on lung under care of pulmonologist at [facility] Dr lua    Myocardial infarct 1988    Urine retention         Past Surgical History:   Procedure Laterality Date    ABDOMINAL AORTIC ANEURYSM REPAIR  2007    BLADDER SURGERY      CARDIAC CATHETERIZATION      CORONARY ANGIOPLASTY      CORONARY STENT PLACEMENT      FRACTURE SURGERY      arm    PROSTATE SURGERY      TONSILLECTOMY         Family History   Problem Relation Age of Onset    Cancer Father         leukemia    Clotting disorder Mother        Social History     Socioeconomic History    Marital status:    Occupational History    Occupation: retired    Tobacco Use    Smoking status: Current Every Day Smoker     Packs/day: 0.50     Years: 60.00     Pack years: 30.00     Types: Cigarettes    Smokeless tobacco: Never Used   Substance and Sexual Activity    Alcohol use: Yes     Alcohol/week: 1.0 standard drink     Types: 1 Shots of liquor per week     Comment:  five drink per week     Drug use: No    Sexual activity: Yes     Partners: Female     " Birth control/protection: None       Current Outpatient Medications on File Prior to Visit   Medication Sig Dispense Refill    ascorbic acid, vitamin C, (VITAMIN C) 500 MG tablet Take 500 mg by mouth once daily.      aspirin (ECOTRIN) 81 MG EC tablet Take 81 mg by mouth once daily.      atenoloL (TENORMIN) 25 MG tablet TAKE 1 TABLET BY MOUTH ONCE DAILY 90 tablet 0    atorvastatin (LIPITOR) 40 MG tablet TAKE 1 TABLET BY MOUTH ONCE DAILY 90 tablet 3    clopidogreL (PLAVIX) 75 mg tablet TAKE 1 TABLET BY MOUTH ONCE DAILY 90 tablet 3    ergocalciferol (VITAMIN D2) 50,000 unit Cap TAKE 1 CAPSULE BY MOUTH  EVERY 14 DAYS 13 capsule 0    finasteride (PROSCAR) 5 mg tablet TAKE 1 TABLET BY MOUTH ONCE DAILY 90 tablet 3    tamsulosin (FLOMAX) 0.4 mg Cap TAKE 1 CAPSULE BY MOUTH  ONCE DAILY 90 capsule 0    vit A/vit C/vit E/zinc/copper (ICAPS AREDS ORAL) Take by mouth 2 (two) times a day.      albuterol (PROVENTIL HFA) 90 mcg/actuation inhaler Inhale 2 puffs into the lungs daily as needed for Wheezing. Rescue (Patient not taking: Reported on 3/31/2022) 16 g 0    clotrimazole-betamethasone 1-0.05% (LOTRISONE) cream APPLY AND RUB IN A THIN FILM TO AFFECTED AREA TWICE DAILY (IN THE MORNING AND IN THE EVENING)       No current facility-administered medications on file prior to visit.       Review of patient's allergies indicates:  No Known Allergies  Objective:     Vitals:    03/31/22 1506   BP: 124/61   BP Location: Right arm   Patient Position: Sitting   BP Method: Large (Automatic)   Pulse: (!) 55   SpO2: 98%   Weight: 63.3 kg (139 lb 10.6 oz)   Height: 5' 10" (1.778 m)        Physical Exam  Vitals reviewed.   Constitutional:       General: He is not in acute distress.     Appearance: He is well-developed. He is not diaphoretic.   Eyes:      General: No scleral icterus.  Neck:      Vascular: No carotid bruit or JVD.   Cardiovascular:      Rate and Rhythm: Regular rhythm.      Pulses:           Dorsalis pedis pulses are " 0 on the right side and 0 on the left side.        Posterior tibial pulses are 2+ on the right side and 2+ on the left side.      Heart sounds: Normal heart sounds. No murmur heard.    No friction rub. No gallop.      Comments: Pt has stasis changes bilaterally with no ulcers   Feet are warm and pink with skin intact.  Pulmonary:      Effort: Pulmonary effort is normal. No respiratory distress.      Breath sounds: Rhonchi present.   Musculoskeletal:      Right lower leg: No edema.      Left lower leg: Edema (trace pitting) present.   Skin:     General: Skin is warm and dry.   Neurological:      Mental Status: He is alert and oriented to person, place, and time.   Psychiatric:         Behavior: Behavior normal.         Thought Content: Thought content normal.         Judgment: Judgment normal.      BP log at home shows excellent BP control    Cardiac cath report reviewed from 1996    ECG today reviewed by me: NSR, inferior scar, RBBB, no change    Note recent echo showed an inferior wall scar and LVEF 45%    Admission on 11/28/2021, Discharged on 11/28/2021   Component Date Value Ref Range Status    POC Rapid COVID 11/28/2021 Negative  Negative Final     Acceptable 11/28/2021 Yes   Final    POC Molecular Influenza A Ag 11/28/2021 Negative  Negative, Not Reported Final    POC Molecular Influenza B Ag 11/28/2021 Negative  Negative, Not Reported Final     Acceptable 11/28/2021 Yes   Final    WBC 11/28/2021 12.04  3.90 - 12.70 K/uL Final    RBC 11/28/2021 4.47 (A) 4.60 - 6.20 M/uL Final    Hemoglobin 11/28/2021 14.3  14.0 - 18.0 g/dL Final    Hematocrit 11/28/2021 43.1  40.0 - 54.0 % Final    MCV 11/28/2021 96  82 - 98 fL Final    MCH 11/28/2021 32.0 (A) 27.0 - 31.0 pg Final    MCHC 11/28/2021 33.2  32.0 - 36.0 g/dL Final    RDW 11/28/2021 12.8  11.5 - 14.5 % Final    Platelets 11/28/2021 188  150 - 450 K/uL Final    MPV 11/28/2021 9.5  9.2 - 12.9 fL Final    Immature  Granulocytes 11/28/2021 0.5  0.0 - 0.5 % Final    Gran # (ANC) 11/28/2021 9.9 (A) 1.8 - 7.7 K/uL Final    Immature Grans (Abs) 11/28/2021 0.06 (A) 0.00 - 0.04 K/uL Final    Lymph # 11/28/2021 0.9 (A) 1.0 - 4.8 K/uL Final    Mono # 11/28/2021 1.0  0.3 - 1.0 K/uL Final    Eos # 11/28/2021 0.1  0.0 - 0.5 K/uL Final    Baso # 11/28/2021 0.03  0.00 - 0.20 K/uL Final    nRBC 11/28/2021 0  0 /100 WBC Final    Gran % 11/28/2021 82.5 (A) 38.0 - 73.0 % Final    Lymph % 11/28/2021 7.4 (A) 18.0 - 48.0 % Final    Mono % 11/28/2021 8.6  4.0 - 15.0 % Final    Eosinophil % 11/28/2021 0.8  0.0 - 8.0 % Final    Basophil % 11/28/2021 0.2  0.0 - 1.9 % Final    Differential Method 11/28/2021 Automated   Final    Sodium 11/28/2021 139  136 - 145 mmol/L Final    Potassium 11/28/2021 3.6  3.5 - 5.1 mmol/L Final    Chloride 11/28/2021 105  95 - 110 mmol/L Final    CO2 11/28/2021 21 (A) 23 - 29 mmol/L Final    Glucose 11/28/2021 167 (A) 70 - 110 mg/dL Final    BUN 11/28/2021 15  8 - 23 mg/dL Final    Creatinine 11/28/2021 0.9  0.5 - 1.4 mg/dL Final    Calcium 11/28/2021 10.1  8.7 - 10.5 mg/dL Final    Total Protein 11/28/2021 6.9  6.0 - 8.4 g/dL Final    Albumin 11/28/2021 3.1 (A) 3.5 - 5.2 g/dL Final    Total Bilirubin 11/28/2021 1.1 (A) 0.1 - 1.0 mg/dL Final    Alkaline Phosphatase 11/28/2021 101  55 - 135 U/L Final    AST 11/28/2021 11  10 - 40 U/L Final    ALT 11/28/2021 11  10 - 44 U/L Final    Anion Gap 11/28/2021 13  8 - 16 mmol/L Final    eGFR if African American 11/28/2021 >60.0  >60 mL/min/1.73 m^2 Final    eGFR if non African American 11/28/2021 >60.0  >60 mL/min/1.73 m^2 Final    Lactate (Lactic Acid) 11/28/2021 1.2  0.5 - 2.2 mmol/L Final    Blood Culture, Routine 11/28/2021 No growth after 5 days.   Final    Blood Culture, Routine 11/28/2021 No growth after 5 days.   Final    Troponin I 11/28/2021 0.016  0.000 - 0.026 ng/mL Final    Troponin I 11/28/2021 0.019  0.000 - 0.026 ng/mL Final    Hospital Outpatient Visit on 11/03/2021   Component Date Value Ref Range Status    Ao peak maykel 11/03/2021 1.53  m/s Final    IVS 11/03/2021 1.30 (A) 0.6 - 1.1 cm Final    LA size 11/03/2021 2.97  cm Final    LVIDd 11/03/2021 5.16  3.5 - 6.0 cm Final    LVIDs 11/03/2021 3.44  2.1 - 4.0 cm Final    LVOT diameter 11/03/2021 1.71  cm Final    LVOT peak VTI 11/03/2021 26.03  cm Final    Posterior Wall 11/03/2021 1.30  0.6 - 1.1 cm Final    MV Peak A Maykel 11/03/2021 0.98  m/s Final    E wave deceleration time 11/03/2021 399.02  msec Final    MV Peak E Maykel 11/03/2021 0.55  m/s Final    RVDD 11/03/2021 2.68  cm Final    TR Max Maykel 11/03/2021 2.56  m/s Final    Ao root annulus 11/03/2021 3.89  cm Final    AORTIC VALVE CUSP SEPERATION 11/03/2021 1.85  cm Final    PV PEAK VELOCITY 11/03/2021 0.96  cm/s Final    MV stenosis pressure 1/2 time 11/03/2021 115.72  ms Final    LV Diastolic Volume 11/03/2021 127.41  mL Final    LV Systolic Volume 11/03/2021 48.65  mL Final    LVOT peak maykel 11/03/2021 1.06  m/s Final    TDI LATERAL 11/03/2021 0.07  m/s Final    TDI SEPTAL 11/03/2021 0.06  m/s Final    LV LATERAL E/E' RATIO 11/03/2021 7.86  m/s Final    LV SEPTAL E/E' RATIO 11/03/2021 9.17  m/s Final    FS 11/03/2021 33  % Final    LV mass 11/03/2021 275.08  g Final    Left Ventricle Relative Wall Thick* 11/03/2021 0.50  cm Final    AV Velocity Ratio 11/03/2021 0.69   Final    MV valve area p 1/2 method 11/03/2021 1.90  cm2 Final    E/A ratio 11/03/2021 0.56   Final    Mean e' 11/03/2021 0.07  m/s Final    LVOT area 11/03/2021 2.3  cm2 Final    LVOT stroke volume 11/03/2021 59.75  cm3 Final    AV peak gradient 11/03/2021 9  mmHg Final    E/E' ratio 11/03/2021 8.46  m/s Final    Triscuspid Valve Regurgitation Pea* 11/03/2021 26  mmHg Final    Right Atrial Pressure (from IVC) 11/03/2021 3  mmHg Final    EF 11/03/2021 45  % Final    TV rest pulmonary artery pressure 11/03/2021 29  mmHg Final   Lab  Visit on 10/08/2021   Component Date Value Ref Range Status    Final Pathologic Diagnosis 10/08/2021    Final                    Value:Negative for high grade urothelial carcinoma.  Red blood cells present      Disclaimer 10/08/2021    Final                    Value:Screening was performed at [facility], [address], [place] [address].      Urine Culture, Routine 10/08/2021 No significant growth   Final   (      Assessment:     1. Coronary artery disease involving native coronary artery of native heart without angina pectoris    2. S/P PTCA (percutaneous transluminal coronary angioplasty)    3. RBBB    4. PVD (peripheral vascular disease)    5. Hypercholesteremia    6. Hx of myocardial infarction    7. Primary hypertension    8. History of endovascular stent graft for abdominal aortic aneurysm (AAA)    9. Carotid stenosis, asymptomatic, bilateral    10. Aortic arch atherosclerosis    11. Abdominal aortic aneurysm (AAA) without rupture    12. History of bladder cancer    13. Dyspnea on exertion    14. Localized edema    15. Tobacco user    16. Enlarged prostate    17. Centrilobular emphysema      Plan:   Martell was seen today for follow-up.    Diagnoses and all orders for this visit:    Coronary artery disease involving native coronary artery of native heart without angina pectoris    S/P PTCA (percutaneous transluminal coronary angioplasty)    RBBB    PVD (peripheral vascular disease)    Hypercholesteremia    Hx of myocardial infarction    Primary hypertension    History of endovascular stent graft for abdominal aortic aneurysm (AAA)    Carotid stenosis, asymptomatic, bilateral    Aortic arch atherosclerosis    Abdominal aortic aneurysm (AAA) without rupture    History of bladder cancer    Dyspnea on exertion    Localized edema    Tobacco user    Enlarged prostate    Centrilobular emphysema     Pt is doing well    Smoking cessation counseled    Continue  walking program    Due to low albumin pt encouraged to eat more protein    Same meds    RTC 6 months    F/u with Dr Snyder who orders labs next month    F/u with pulmonary - Dr Shelby    No follow-ups on file.

## 2022-04-06 ENCOUNTER — OFFICE VISIT (OUTPATIENT)
Dept: PULMONOLOGY | Facility: CLINIC | Age: 82
End: 2022-04-06
Payer: MEDICARE

## 2022-04-06 VITALS
DIASTOLIC BLOOD PRESSURE: 63 MMHG | WEIGHT: 141 LBS | SYSTOLIC BLOOD PRESSURE: 112 MMHG | OXYGEN SATURATION: 97 % | HEART RATE: 64 BPM | BODY MASS INDEX: 20.23 KG/M2

## 2022-04-06 DIAGNOSIS — J43.2 CENTRILOBULAR EMPHYSEMA: ICD-10-CM

## 2022-04-06 DIAGNOSIS — R91.1 LUNG NODULE: Primary | ICD-10-CM

## 2022-04-06 DIAGNOSIS — R91.1 PULMONARY NODULE: ICD-10-CM

## 2022-04-06 PROCEDURE — 3288F FALL RISK ASSESSMENT DOCD: CPT | Mod: CPTII,S$GLB,, | Performed by: INTERNAL MEDICINE

## 2022-04-06 PROCEDURE — 1160F RVW MEDS BY RX/DR IN RCRD: CPT | Mod: CPTII,S$GLB,, | Performed by: INTERNAL MEDICINE

## 2022-04-06 PROCEDURE — 1126F PR PAIN SEVERITY QUANTIFIED, NO PAIN PRESENT: ICD-10-PCS | Mod: CPTII,S$GLB,, | Performed by: INTERNAL MEDICINE

## 2022-04-06 PROCEDURE — 1101F PR PT FALLS ASSESS DOC 0-1 FALLS W/OUT INJ PAST YR: ICD-10-PCS | Mod: CPTII,S$GLB,, | Performed by: INTERNAL MEDICINE

## 2022-04-06 PROCEDURE — 3074F PR MOST RECENT SYSTOLIC BLOOD PRESSURE < 130 MM HG: ICD-10-PCS | Mod: CPTII,S$GLB,, | Performed by: INTERNAL MEDICINE

## 2022-04-06 PROCEDURE — 1126F AMNT PAIN NOTED NONE PRSNT: CPT | Mod: CPTII,S$GLB,, | Performed by: INTERNAL MEDICINE

## 2022-04-06 PROCEDURE — 3078F DIAST BP <80 MM HG: CPT | Mod: CPTII,S$GLB,, | Performed by: INTERNAL MEDICINE

## 2022-04-06 PROCEDURE — 99213 OFFICE O/P EST LOW 20 MIN: CPT | Mod: S$GLB,,, | Performed by: INTERNAL MEDICINE

## 2022-04-06 PROCEDURE — 3074F SYST BP LT 130 MM HG: CPT | Mod: CPTII,S$GLB,, | Performed by: INTERNAL MEDICINE

## 2022-04-06 PROCEDURE — 1160F PR REVIEW ALL MEDS BY PRESCRIBER/CLIN PHARMACIST DOCUMENTED: ICD-10-PCS | Mod: CPTII,S$GLB,, | Performed by: INTERNAL MEDICINE

## 2022-04-06 PROCEDURE — 99999 PR PBB SHADOW E&M-EST. PATIENT-LVL III: CPT | Mod: PBBFAC,,, | Performed by: INTERNAL MEDICINE

## 2022-04-06 PROCEDURE — 1159F PR MEDICATION LIST DOCUMENTED IN MEDICAL RECORD: ICD-10-PCS | Mod: CPTII,S$GLB,, | Performed by: INTERNAL MEDICINE

## 2022-04-06 PROCEDURE — 1159F MED LIST DOCD IN RCRD: CPT | Mod: CPTII,S$GLB,, | Performed by: INTERNAL MEDICINE

## 2022-04-06 PROCEDURE — 1157F ADVNC CARE PLAN IN RCRD: CPT | Mod: CPTII,S$GLB,, | Performed by: INTERNAL MEDICINE

## 2022-04-06 PROCEDURE — 1157F PR ADVANCE CARE PLAN OR EQUIV PRESENT IN MEDICAL RECORD: ICD-10-PCS | Mod: CPTII,S$GLB,, | Performed by: INTERNAL MEDICINE

## 2022-04-06 PROCEDURE — 99213 PR OFFICE/OUTPT VISIT, EST, LEVL III, 20-29 MIN: ICD-10-PCS | Mod: S$GLB,,, | Performed by: INTERNAL MEDICINE

## 2022-04-06 PROCEDURE — 99999 PR PBB SHADOW E&M-EST. PATIENT-LVL III: ICD-10-PCS | Mod: PBBFAC,,, | Performed by: INTERNAL MEDICINE

## 2022-04-06 PROCEDURE — 1101F PT FALLS ASSESS-DOCD LE1/YR: CPT | Mod: CPTII,S$GLB,, | Performed by: INTERNAL MEDICINE

## 2022-04-06 PROCEDURE — 3078F PR MOST RECENT DIASTOLIC BLOOD PRESSURE < 80 MM HG: ICD-10-PCS | Mod: CPTII,S$GLB,, | Performed by: INTERNAL MEDICINE

## 2022-04-06 PROCEDURE — 3288F PR FALLS RISK ASSESSMENT DOCUMENTED: ICD-10-PCS | Mod: CPTII,S$GLB,, | Performed by: INTERNAL MEDICINE

## 2022-04-06 RX ORDER — ALBUTEROL SULFATE 90 UG/1
2 AEROSOL, METERED RESPIRATORY (INHALATION) EVERY 6 HOURS PRN
Qty: 18 G | Refills: 2 | Status: SHIPPED | OUTPATIENT
Start: 2022-04-06 | End: 2022-07-07

## 2022-04-06 NOTE — PROGRESS NOTES
Subjective:       Patient ID: Martell Whitaker is a 81 y.o. male.    Chief Complaint: Follow-up (6mos f/u)    80 year old male with COPD who has followed with Leonie Holland who presented for follow up. At his last visit he had hemoptysis and sent to the ER. Diagnosed with PNA and sent home. Improvement in symptoms.   He continues to smoke.      CT findings below:         1. Emphysematous changes of the lungs.  Minimal thickening associated with a few emphysematous blebs in the right middle lobe adjacent to the mediastinum could represent mild infection/pneumonitis.  Recommend follow-up.  2. Few bilateral small pulmonary nodules which are indeterminate.  The largest and most suspicious lesion is at the right lung apex measuring approximately 1 cm.  Primary or metastatic process is a consideration and follow-up is recommended.  3. Low-density homogeneous ovoid masses at the lateral margin of multiple lower thoracic neural foramen with the largest lesion measuring approximately 3.2 cm at T11-12 on the left.  Findings are similar to prior studies could represent schwannomas or neurofibromas.  Recommend clinical correlation and follow-up.  4. Multiple stable small low/fluid density lesions in the liver suggesting multiple hepatic cysts.  Few of the lesions are too small for accurate characterization.  Recommend clinical correlation.  5. Bilateral renal cysts and nonobstructing nephrolithiasis     Interval hx: 4/6/2022- Doing well. No longer inhaler therapy. No issues. Still smoking.       Review of Systems   Respiratory: Positive for cough and shortness of breath. Negative for sputum production.        Objective:      Physical Exam   Constitutional: He is oriented to person, place, and time. He appears well-developed and well-nourished.   Cardiovascular: Normal rate.   Pulmonary/Chest: Normal expansion and symmetric chest wall expansion.   Neurological: He is alert and oriented to person, place, and time.   Nursing  note and vitals reviewed.    Personal Diagnostic Review  none pertinent  No flowsheet data found.      Assessment:       No diagnosis found.    Outpatient Encounter Medications as of 4/6/2022   Medication Sig Dispense Refill    ascorbic acid, vitamin C, (VITAMIN C) 500 MG tablet Take 500 mg by mouth once daily.      aspirin (ECOTRIN) 81 MG EC tablet Take 81 mg by mouth once daily.      atenoloL (TENORMIN) 25 MG tablet TAKE 1 TABLET BY MOUTH ONCE DAILY 90 tablet 0    atorvastatin (LIPITOR) 40 MG tablet TAKE 1 TABLET BY MOUTH ONCE DAILY 90 tablet 3    clopidogreL (PLAVIX) 75 mg tablet TAKE 1 TABLET BY MOUTH ONCE DAILY 90 tablet 3    ergocalciferol (VITAMIN D2) 50,000 unit Cap TAKE 1 CAPSULE BY MOUTH  EVERY 14 DAYS 13 capsule 0    finasteride (PROSCAR) 5 mg tablet TAKE 1 TABLET BY MOUTH ONCE DAILY 90 tablet 3    tamsulosin (FLOMAX) 0.4 mg Cap TAKE 1 CAPSULE BY MOUTH  ONCE DAILY 90 capsule 0    vit A/vit C/vit E/zinc/copper (ICAPS AREDS ORAL) Take by mouth 2 (two) times a day.       No facility-administered encounter medications on file as of 4/6/2022.     No orders of the defined types were placed in this encounter.      Plan:          Pulmonary nodule  Repeat CT chest    Centrilobular emphysema  Smoking cessation.  SHOSHANA

## 2022-04-19 ENCOUNTER — TELEPHONE (OUTPATIENT)
Dept: PULMONOLOGY | Facility: CLINIC | Age: 82
End: 2022-04-19
Payer: MEDICARE

## 2022-04-19 NOTE — TELEPHONE ENCOUNTER
----- Message from Roni Chicas sent at 4/19/2022  3:09 PM CDT -----  Contact: @ [phone]  Patient requesting a return call about the 4-13th results, Please return call to discuss further

## 2022-04-20 ENCOUNTER — TELEPHONE (OUTPATIENT)
Dept: PULMONOLOGY | Facility: CLINIC | Age: 82
End: 2022-04-20
Payer: MEDICARE

## 2022-04-20 DIAGNOSIS — R91.1 LUNG NODULE: Primary | ICD-10-CM

## 2022-04-20 NOTE — TELEPHONE ENCOUNTER
I called the patient and this has to be done at [facility]. I gave him [phone] to call to schedule his NM Pet CT.        ----- Message from Nelly Shelby MD sent at 4/20/2022  8:30 AM CDT -----  Please schedule this patient for PET. Thanks.     Nelly

## 2022-04-22 ENCOUNTER — TELEPHONE (OUTPATIENT)
Dept: PULMONOLOGY | Facility: CLINIC | Age: 82
End: 2022-04-22
Payer: MEDICARE

## 2022-04-22 NOTE — TELEPHONE ENCOUNTER
----- Message from Soren Urena sent at 4/22/2022  3:08 PM CDT -----  Contact: patient  Patient requesting call back in regards to Pet Scan.      Patient @[phone] ([place])

## 2022-04-25 ENCOUNTER — TELEPHONE (OUTPATIENT)
Dept: PULMONOLOGY | Facility: CLINIC | Age: 82
End: 2022-04-25
Payer: MEDICARE

## 2022-04-25 NOTE — TELEPHONE ENCOUNTER
Left message on patient voicemail, informing him that I'm contacting him in regards to his message. I advised patient that if he has any questions or concerns, he may contact the office. Office number has been provided.

## 2022-04-25 NOTE — TELEPHONE ENCOUNTER
----- Message from Vanessa Back sent at 4/25/2022  8:04 AM CDT -----  Regarding: Imaging questions  Contact: [phone]  Calling to speak with nurse regarding upcoming testing. Please call

## 2022-04-26 ENCOUNTER — TELEPHONE (OUTPATIENT)
Dept: PULMONOLOGY | Facility: CLINIC | Age: 82
End: 2022-04-26
Payer: MEDICARE

## 2022-04-26 NOTE — TELEPHONE ENCOUNTER
----- Message from Roni Chicas sent at 4/26/2022  4:24 PM CDT -----  Contact: @ [phone]  Patient requesting a return call about the 5-9th appt and to make sure everything is all right with it,  Please return call to discuss further

## 2022-04-26 NOTE — TELEPHONE ENCOUNTER
Spoke with patient, informed him that I have received his message. Patient wants to know if he has to fasten prior to his PET Scan appointment on 5/9/22. I verbalized to patient that I understand and advised patient that he does have to fast and to not eat or drink anything 6 hours prior to his PET SCAN appointment. Patient verbalized that he understand.

## 2022-05-09 ENCOUNTER — HOSPITAL ENCOUNTER (OUTPATIENT)
Dept: RADIOLOGY | Facility: HOSPITAL | Age: 82
Discharge: HOME | End: 2022-05-09
Attending: INTERNAL MEDICINE
Payer: MEDICARE

## 2022-05-09 DIAGNOSIS — R91.1 LUNG NODULE: ICD-10-CM

## 2022-05-09 LAB — POCT GLUCOSE: 106 MG/DL (ref 70–110)

## 2022-05-09 PROCEDURE — 25500020 PHARM REV CODE 255: Performed by: INTERNAL MEDICINE

## 2022-05-09 PROCEDURE — A9698 NON-RAD CONTRAST MATERIALNOC: HCPCS | Performed by: INTERNAL MEDICINE

## 2022-05-09 PROCEDURE — 78815 PET IMAGE W/CT SKULL-THIGH: CPT | Mod: 26,PS,, | Performed by: RADIOLOGY

## 2022-05-09 PROCEDURE — 78815 PET IMAGE W/CT SKULL-THIGH: CPT | Mod: TC

## 2022-05-09 PROCEDURE — 78815 NM PET CT ROUTINE: ICD-10-PCS | Mod: 26,PS,, | Performed by: RADIOLOGY

## 2022-05-09 RX ADMIN — IOHEXOL 1000 ML: 9 SOLUTION ORAL at 10:05

## 2022-05-17 ENCOUNTER — TELEPHONE (OUTPATIENT)
Dept: PULMONOLOGY | Facility: CLINIC | Age: 82
End: 2022-05-17
Payer: MEDICARE

## 2022-05-17 DIAGNOSIS — R91.1 LUNG NODULE: Primary | ICD-10-CM

## 2022-05-17 NOTE — TELEPHONE ENCOUNTER
I spoke with patient to schedule robotic bronchoscopy consult with Dr Acosta on 5/25/22 at 1:30pm/Dr Shelby. Robotic bronchoscopy scheduled on 5/31/22 per Dr Acosta. Appointment mailed. Patient confirmed and verbalized understanding.

## 2022-05-17 NOTE — TELEPHONE ENCOUNTER
----- Message from Rigo Mari sent at 5/16/2022  2:43 PM CDT -----  Contact: Patient  The pt called and needs to have to schedule a biopsy    Please call him back at [phone]

## 2022-05-24 ENCOUNTER — OFFICE VISIT (OUTPATIENT)
Dept: PULMONOLOGY | Facility: CLINIC | Age: 82
End: 2022-05-24
Payer: MEDICARE

## 2022-05-24 VITALS
HEART RATE: 61 BPM | WEIGHT: 139.31 LBS | BODY MASS INDEX: 19.94 KG/M2 | OXYGEN SATURATION: 96 % | DIASTOLIC BLOOD PRESSURE: 64 MMHG | SYSTOLIC BLOOD PRESSURE: 112 MMHG | HEIGHT: 70 IN

## 2022-05-24 DIAGNOSIS — J44.9 CHRONIC OBSTRUCTIVE PULMONARY DISEASE, UNSPECIFIED COPD TYPE: ICD-10-CM

## 2022-05-24 DIAGNOSIS — R91.8 LUNG MASS: ICD-10-CM

## 2022-05-24 DIAGNOSIS — F17.200 TOBACCO DEPENDENCY: ICD-10-CM

## 2022-05-24 DIAGNOSIS — R91.1 LUNG NODULE: Primary | ICD-10-CM

## 2022-05-24 DIAGNOSIS — I25.10 CORONARY ARTERY DISEASE, UNSPECIFIED VESSEL OR LESION TYPE, UNSPECIFIED WHETHER ANGINA PRESENT, UNSPECIFIED WHETHER NATIVE OR TRANSPLANTED HEART: ICD-10-CM

## 2022-05-24 PROCEDURE — 1157F PR ADVANCE CARE PLAN OR EQUIV PRESENT IN MEDICAL RECORD: ICD-10-PCS | Mod: CPTII,S$GLB,, | Performed by: EMERGENCY MEDICINE

## 2022-05-24 PROCEDURE — 3074F PR MOST RECENT SYSTOLIC BLOOD PRESSURE < 130 MM HG: ICD-10-PCS | Mod: CPTII,S$GLB,, | Performed by: EMERGENCY MEDICINE

## 2022-05-24 PROCEDURE — 1126F AMNT PAIN NOTED NONE PRSNT: CPT | Mod: CPTII,S$GLB,, | Performed by: EMERGENCY MEDICINE

## 2022-05-24 PROCEDURE — 99999 PR PBB SHADOW E&M-EST. PATIENT-LVL III: ICD-10-PCS | Mod: PBBFAC,,, | Performed by: EMERGENCY MEDICINE

## 2022-05-24 PROCEDURE — 3288F FALL RISK ASSESSMENT DOCD: CPT | Mod: CPTII,S$GLB,, | Performed by: EMERGENCY MEDICINE

## 2022-05-24 PROCEDURE — 3078F DIAST BP <80 MM HG: CPT | Mod: CPTII,S$GLB,, | Performed by: EMERGENCY MEDICINE

## 2022-05-24 PROCEDURE — 3074F SYST BP LT 130 MM HG: CPT | Mod: CPTII,S$GLB,, | Performed by: EMERGENCY MEDICINE

## 2022-05-24 PROCEDURE — 1160F RVW MEDS BY RX/DR IN RCRD: CPT | Mod: CPTII,S$GLB,, | Performed by: EMERGENCY MEDICINE

## 2022-05-24 PROCEDURE — 1159F PR MEDICATION LIST DOCUMENTED IN MEDICAL RECORD: ICD-10-PCS | Mod: CPTII,S$GLB,, | Performed by: EMERGENCY MEDICINE

## 2022-05-24 PROCEDURE — 3078F PR MOST RECENT DIASTOLIC BLOOD PRESSURE < 80 MM HG: ICD-10-PCS | Mod: CPTII,S$GLB,, | Performed by: EMERGENCY MEDICINE

## 2022-05-24 PROCEDURE — 99214 OFFICE O/P EST MOD 30 MIN: CPT | Mod: S$GLB,,, | Performed by: EMERGENCY MEDICINE

## 2022-05-24 PROCEDURE — 1101F PT FALLS ASSESS-DOCD LE1/YR: CPT | Mod: CPTII,S$GLB,, | Performed by: EMERGENCY MEDICINE

## 2022-05-24 PROCEDURE — 1160F PR REVIEW ALL MEDS BY PRESCRIBER/CLIN PHARMACIST DOCUMENTED: ICD-10-PCS | Mod: CPTII,S$GLB,, | Performed by: EMERGENCY MEDICINE

## 2022-05-24 PROCEDURE — 3288F PR FALLS RISK ASSESSMENT DOCUMENTED: ICD-10-PCS | Mod: CPTII,S$GLB,, | Performed by: EMERGENCY MEDICINE

## 2022-05-24 PROCEDURE — 99999 PR PBB SHADOW E&M-EST. PATIENT-LVL III: CPT | Mod: PBBFAC,,, | Performed by: EMERGENCY MEDICINE

## 2022-05-24 PROCEDURE — 1157F ADVNC CARE PLAN IN RCRD: CPT | Mod: CPTII,S$GLB,, | Performed by: EMERGENCY MEDICINE

## 2022-05-24 PROCEDURE — 1101F PR PT FALLS ASSESS DOC 0-1 FALLS W/OUT INJ PAST YR: ICD-10-PCS | Mod: CPTII,S$GLB,, | Performed by: EMERGENCY MEDICINE

## 2022-05-24 PROCEDURE — 1159F MED LIST DOCD IN RCRD: CPT | Mod: CPTII,S$GLB,, | Performed by: EMERGENCY MEDICINE

## 2022-05-24 PROCEDURE — 99214 PR OFFICE/OUTPT VISIT, EST, LEVL IV, 30-39 MIN: ICD-10-PCS | Mod: S$GLB,,, | Performed by: EMERGENCY MEDICINE

## 2022-05-24 PROCEDURE — 1126F PR PAIN SEVERITY QUANTIFIED, NO PAIN PRESENT: ICD-10-PCS | Mod: CPTII,S$GLB,, | Performed by: EMERGENCY MEDICINE

## 2022-05-24 NOTE — PROGRESS NOTES
"      Pulmonary & Critical Care Medicine   Consultation Note    Reason for Consultation: Lung nodule.     HPI: Followed by MORENA Koenig and most recently Dr. Shelby-    80 year old male with COPD who has followed with Leonie Holland who presented for follow up. At his last visit he had hemoptysis and sent to the ER. Diagnosed with PNA and sent home. Improvement in symptoms.   He continues to smoke.      CT findings below:         1. Emphysematous changes of the lungs.  Minimal thickening associated with a few emphysematous blebs in the right middle lobe adjacent to the mediastinum could represent mild infection/pneumonitis.  Recommend follow-up.  2. Few bilateral small pulmonary nodules which are indeterminate.  The largest and most suspicious lesion is at the right lung apex measuring approximately 1 cm.  Primary or metastatic process is a consideration and follow-up is recommended.  3. Low-density homogeneous ovoid masses at the lateral margin of multiple lower thoracic neural foramen with the largest lesion measuring approximately 3.2 cm at T11-12 on the left.  Findings are similar to prior studies could represent schwannomas or neurofibromas.  Recommend clinical correlation and follow-up.  4. Multiple stable small low/fluid density lesions in the liver suggesting multiple hepatic cysts.  Few of the lesions are too small for accurate characterization.  Recommend clinical correlation.  5. Bilateral renal cysts and nonobstructing nephrolithiasis     Interval hx: 4/6/2022- Doing well. No longer inhaler therapy. No issues. Still smoking.     Interval History- Sent to me for evaluation of abnormal CT findings and biopsy. Still smoking about 1ppd. He has a daily cough, but not worse. No real exertional limitation with normal activity. Remains active. No anginal/orthopnea/PND. Never hospitalized for exacerbation of lung disease.   On plavix/ASA for CAD- no recent stents "in years"  Does endorse lateral thigh pain with " walking, but stable.   No falls.   Weight stable. No F/C/NS or additional constitutional features.   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 No asbestos. Lives in [place]- Moved 2 years.. No renovations. No home issues.   Exposure to Animals/Pets: None   None.   Travel History: None   History of exposures to TB: None.   Family History of Lung Cancer- Yes- son.   Childhood history of Lung Disease:None   Chest surgery/Trauma- Has AAA stent. Hear stents   Tobacco use- 1ppd- 65 pk/yr.   Anti-coagulation/Anti-plt On plavix/ASA- No recent stents   Recurrent PNA. Once. None recurrent.   Anesthesia complications- No complications.   Not on inhaler therapy         Past Medical History:   Diagnosis Date    AAA (abdominal aortic aneurysm) without rupture     pt with h/o aaa     Acute coronary syndrome     Cancer     bladder    Carotid artery occlusion     COPD (chronic obstructive pulmonary disease)     Coronary artery disease     Enlarged prostate     History of bladder cancer     Hyperlipidemia     Hypertension     Lung abnormality     spot on lung [facility] of [facility]ologist [facility] Dr lua    Myocardial infarct 1988    Urine retention      Past Surgical History:   Procedure Laterality Date    ABDOMINAL AORTIC ANEURYSM REPAIR  2007    BLADDER SURGERY      CARDIAC CATHETERIZATION      CORONARY ANGIOPLASTY      CORONARY STENT PLACEMENT      FRACTURE SURGERY      arm    PROSTATE SURGERY      TONSILLECTOMY       Family/Social History- Reviewed and updated.     Drug Allergies:   Review of patient's allergies indicates:  No Known Allergies    Review of Systems:   A comprehensive 12-point review of systems was performed, and is negative except for those items mentioned above in the HPI section of this note.     Vital Signs:    /64 (BP Location: Right  "arm, Patient Position: Sitting)   Pulse 61   Ht 5' 10" (1.778 m)   Wt 63.2 kg (139 lb 5.3 oz)   SpO2 96%   BMI 19.99 kg/m²        Physical Exam:   GEN- NAD AAOx3 Well Built, Well Appearing   HEENT- ATNC, PERRLA, EOMI, OP-Cl. No JVD, LAD or bruit noted. Trachea Midline.   CV- RRR No M/R/G  RESP- CTA-Bilateral   GI- S/NT/ND. Positive BS X 4. No HSM Noted  BACK- Spine midline. No step off, crepitus or deformity noted. No midline TTP.   Ext- MAEW, No deformity. No edema or rashes noted.   Neuro- Strength 5/5 symmetric. CN 2-12 intact. Normal gait. Normal sensation.      Personal Review and Summary of Prior Diagnostics    Laboratory Studies: Reviewed    Latest Reference Range & Units 04/06/22 14:17   WBC 3.90 - 12.70 K/uL 8.50   RBC 4.60 - 6.20 M/uL 4.62   Hemoglobin 14.0 - 18.0 g/dL 14.4   Hematocrit 40.0 - 54.0 % 45.3   MCV 82 - 98 fL 98   MCH 27.0 - 31.0 pg 31.2 (H)   MCHC 32.0 - 36.0 g/dL 31.8 (L)   RDW 11.5 - 14.5 % 13.4   Platelets 150 - 450 K/uL 151   MPV 9.2 - 12.9 fL 9.9   Gran % 38.0 - 73.0 % 72.5   Lymph % 18.0 - 48.0 % 16.1 (L)   Mono % 4.0 - 15.0 % 8.4   Eosinophil % 0.0 - 8.0 % 2.2   Basophil % 0.0 - 1.9 % 0.6   Immature Granulocytes 0.0 - 0.5 % 0.2   Gran # (ANC) 1.8 - 7.7 K/uL 6.2   Lymph # 1.0 - 4.8 K/uL 1.4   Mono # 0.3 - 1.0 K/uL 0.7   Eos # 0.0 - 0.5 K/uL 0.2   Baso # 0.00 - 0.20 K/uL 0.05   Immature Grans (Abs) 0.00 - 0.04 K/uL 0.02   nRBC 0 /100 WBC 0   Differential Method  Automated   Sodium 136 - 145 mmol/L 144   Potassium 3.5 - 5.1 mmol/L 3.8   Chloride 95 - 110 mmol/L 107   CO2 23 - 29 mmol/L 24   Anion Gap 8 - 16 mmol/L 13   BUN 8 - 23 mg/dL 16   Creatinine 0.5 - 1.4 mg/dL 1.1   eGFR if non African American >60 mL/min/1.73 m^2 >60.0   eGFR if African American >60 mL/min/1.73 m^2 >60.0   Glucose 70 - 110 mg/dL 94   Calcium 8.7 - 10.5 mg/dL 9.6   Alkaline Phosphatase 55 - 135 U/L 105   PROTEIN TOTAL 6.0 - 8.4 g/dL 6.6   Albumin 3.5 - 5.2 g/dL 3.7   BILIRUBIN TOTAL 0.1 - 1.0 mg/dL 0.7   AST " 10 - 40 U/L 18   ALT 10 - 44 U/L 16   Cholesterol 120 - 199 mg/dL 104 (L)   HDL 40 - 75 mg/dL 37 (L)   HDL/Cholesterol Ratio 20.0 - 50.0 % 35.6   LDL Cholesterol External 63.0 - 159.0 mg/dL 36.6 (L)   Non-HDL Cholesterol mg/dL 67   Total Cholesterol/HDL Ratio 2.0 - 5.0  2.8   Triglycerides 30 - 150 mg/dL 152 (H)   (H): Data is abnormally high  (L): Data is abnormally low      Microbiology Data:   Reviewed       Summary of Chest Imaging Personally Reviewed:     CT Chest-     Biapical pleuroparenchymal changes and scarring with pulmonary emphysema.  Increasing size of right apical spiculated lesion measuring 2.2 x 2.0 cm concerning for neoplasm.  Further correlation with tissue sampling and/or PET-CT suggested.     Other subcentimeter nodules including a new appearing 0.5 cm right lower lobe nodule.  Continued close surveillance at a minimum.       NM PET- 1.  Single hypermetabolic, spiculated right upper lobe nodule, with imaging characteristics concerning for malignant etiology.  No evidence for pleural or mediastinal extension, as well as no evidence for hypermetabolic or enlarged mediastinal or hilar lymph nodes.     2.  Prostatomegaly with multiple dystrophic prostatic calcifications and heterogeneous regions of increased uptake.  Findings are nonspecific, and may be seen in the setting of prostatitis.  However, malignancy cannot be excluded.  Recommend correlation with physical examination and PSA and MRI of the prostate if clinically indicated.      · 2D Echo: The left ventricle is normal in size with mild concentric hypertrophy and mildly decreased systolic function.  · The estimated ejection fraction is 45%.  · Grade I left ventricular diastolic dysfunction.  · There are segmental left ventricular wall motion abnormalities.  · Normal central venous pressure (3 mmHg).  · The estimated PA systolic pressure is 29 mmHg.  · Mild tricuspid regurgitation.  · Normal right ventricular size with normal right  ventricular systolic function.        PFT's: None     Assessment:       No diagnosis found.    Outpatient Encounter Medications as of 5/24/2022   Medication Sig Dispense Refill    albuterol (PROVENTIL/VENTOLIN HFA) 90 mcg/actuation inhaler Inhale 2 puffs into the lungs every 6 (six) hours as needed for Wheezing. Rescue 18 g 2    ascorbic acid, vitamin C, (VITAMIN C) 500 MG tablet Take 500 mg by mouth once daily.      aspirin (ECOTRIN) 81 MG EC tablet Take 81 mg by mouth once daily.      atenoloL (TENORMIN) 25 MG tablet Take 1 tablet (25 mg total) by mouth once daily. 90 tablet 0    atorvastatin (LIPITOR) 40 MG tablet Take 1 tablet (40 mg total) by mouth once daily. 90 tablet 0    clopidogreL (PLAVIX) 75 mg tablet Take 1 tablet (75 mg total) by mouth once daily. 90 tablet 0    ergocalciferol (VITAMIN D2) 50,000 unit Cap TAKE 1 CAPSULE BY MOUTH  EVERY 14 DAYS 13 capsule 0    finasteride (PROSCAR) 5 mg tablet Take 1 tablet (5 mg total) by mouth once daily. 90 tablet 0    tamsulosin (FLOMAX) 0.4 mg Cap Take 1 capsule (0.4 mg total) by mouth once daily. 90 capsule 0    vit A/vit C/vit E/zinc/copper (ICAPS AREDS ORAL) Take by mouth 2 (two) times a day.       No facility-administered encounter medications on file as of 5/24/2022.     No orders of the defined types were placed in this encounter.      Plan:           1. RUL Pulmonary nodule- 2.2 x 2.0 cm SUV 11 on NM PET. No evidence of distant disease. No enlarged mediastinal or hilar nodes. Concern for malignancy. On ASA/Plavix, but no recent stents/PCI.    -- CT adequate   -- Hold ASA/Plavix x 7 days   -- Plan for robotic + EBUS for diagnosis/staging on May 31st    Discussed procedure in detail and all questions answered. Risk including PTX, bleeding, respiratory failure and numerous potential complications up to death explained. He verbalized understanding and in agreement to proceed. Written consent signed and on chart.     2. Tobacco dependency- Discussed  need for cessation- Not interested in quitting.     3. CAD- on DAPT + GDMT. Follows with cards. No angina or exertional limitation. Will hold plavix/ASA x 7 days to facilitate biopsy     4. COPD- Prn albuterol. Does not need. No exacerbation history MMRC 3. No e/o acute exacerbation on my exam. Not hypoxic. Continue management per primary pulmonologist Dr. Shelby.       Case request placed. General anesthesia consent on day of procedure. Instructions provided. Follow up to be determined after procedure completed.       Mickey Acosta M.D.   [facility]

## 2022-05-24 NOTE — H&P (VIEW-ONLY)
"      Pulmonary & Critical Care Medicine   Consultation Note    Reason for Consultation: Lung nodule.     HPI: Followed by MORENA Koenig and most recently Dr. Shelby-    80 year old male with COPD who has followed with Leonie Holland who presented for follow up. At his last visit he had hemoptysis and sent to the ER. Diagnosed with PNA and sent home. Improvement in symptoms.   He continues to smoke.      CT findings below:         1. Emphysematous changes of the lungs.  Minimal thickening associated with a few emphysematous blebs in the right middle lobe adjacent to the mediastinum could represent mild infection/pneumonitis.  Recommend follow-up.  2. Few bilateral small pulmonary nodules which are indeterminate.  The largest and most suspicious lesion is at the right lung apex measuring approximately 1 cm.  Primary or metastatic process is a consideration and follow-up is recommended.  3. Low-density homogeneous ovoid masses at the lateral margin of multiple lower thoracic neural foramen with the largest lesion measuring approximately 3.2 cm at T11-12 on the left.  Findings are similar to prior studies could represent schwannomas or neurofibromas.  Recommend clinical correlation and follow-up.  4. Multiple stable small low/fluid density lesions in the liver suggesting multiple hepatic cysts.  Few of the lesions are too small for accurate characterization.  Recommend clinical correlation.  5. Bilateral renal cysts and nonobstructing nephrolithiasis     Interval hx: 4/6/2022- Doing well. No longer inhaler therapy. No issues. Still smoking.     Interval History- Sent to me for evaluation of abnormal CT findings and biopsy. Still smoking about 1ppd. He has a daily cough, but not worse. No real exertional limitation with normal activity. Remains active. No anginal/orthopnea/PND. Never hospitalized for exacerbation of lung disease.   On plavix/ASA for CAD- no recent stents "in years"  Does endorse lateral thigh pain with " walking, but stable.   No falls.   Weight stable. No F/C/NS or additional constitutional features.   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 Lives in [place]- Moved 2 years.. No renovations. No home issues.   Exposure to Animals/Pets: None   None.   Travel History: None   History of exposures to TB: None.   Family History of Lung Cancer- Yes- son.   Childhood history of Lung Disease:None   Chest surgery/Trauma- Has AAA stent. Hear stents   Tobacco use- 1ppd- 65 pk/yr.   Anti-coagulation/Anti-plt On plavix/ASA- No recent stents   Recurrent PNA. Once. None recurrent.   Anesthesia complications- No complications.   Not on inhaler therapy         Past Medical History:   Diagnosis Date    AAA (abdominal aortic aneurysm) without rupture     pt with h/o aaa     Acute coronary syndrome     Cancer     bladder    Carotid artery occlusion     COPD (chronic obstructive pulmonary disease)     Coronary artery disease     Enlarged prostate     History of bladder cancer     Hyperlipidemia     Hypertension     Lung abnormality     spot on lung under care of pulmonologist at [facility] Dr lua    Myocardial infarct 1988    Urine retention      Past Surgical History:   Procedure Laterality Date    ABDOMINAL AORTIC ANEURYSM REPAIR  2007    BLADDER SURGERY      CARDIAC CATHETERIZATION      CORONARY ANGIOPLASTY      CORONARY STENT PLACEMENT      FRACTURE SURGERY      arm    PROSTATE SURGERY      TONSILLECTOMY       Family/Social History- Reviewed and updated.     Drug Allergies:   Review of patient's allergies indicates:  No Known Allergies    Review of Systems:   A comprehensive 12-point review of systems was performed, and is negative except for those items mentioned above in the HPI section of this note.     Vital Signs:    /64 (BP Location: Right  "arm, Patient Position: Sitting)   Pulse 61   Ht 5' 10" (1.778 m)   Wt 63.2 kg (139 lb 5.3 oz)   SpO2 96%   BMI 19.99 kg/m²        Physical Exam:   GEN- NAD AAOx3 Well Built, Well Appearing   HEENT- ATNC, PERRLA, EOMI, OP-Cl. No JVD, LAD or bruit noted. Trachea Midline.   CV- RRR No M/R/G  RESP- CTA-Bilateral   GI- S/NT/ND. Positive BS X 4. No HSM Noted  BACK- Spine midline. No step off, crepitus or deformity noted. No midline TTP.   Ext- MAEW, No deformity. No edema or rashes noted.   Neuro- Strength 5/5 symmetric. CN 2-12 intact. Normal gait. Normal sensation.      Personal Review and Summary of Prior Diagnostics    Laboratory Studies: Reviewed    Latest Reference Range & Units 04/06/22 14:17   WBC 3.90 - 12.70 K/uL 8.50   RBC 4.60 - 6.20 M/uL 4.62   Hemoglobin 14.0 - 18.0 g/dL 14.4   Hematocrit 40.0 - 54.0 % 45.3   MCV 82 - 98 fL 98   MCH 27.0 - 31.0 pg 31.2 (H)   MCHC 32.0 - 36.0 g/dL 31.8 (L)   RDW 11.5 - 14.5 % 13.4   Platelets 150 - 450 K/uL 151   MPV 9.2 - 12.9 fL 9.9   Gran % 38.0 - 73.0 % 72.5   Lymph % 18.0 - 48.0 % 16.1 (L)   Mono % 4.0 - 15.0 % 8.4   Eosinophil % 0.0 - 8.0 % 2.2   Basophil % 0.0 - 1.9 % 0.6   Immature Granulocytes 0.0 - 0.5 % 0.2   Gran # (ANC) 1.8 - 7.7 K/uL 6.2   Lymph # 1.0 - 4.8 K/uL 1.4   Mono # 0.3 - 1.0 K/uL 0.7   Eos # 0.0 - 0.5 K/uL 0.2   Baso # 0.00 - 0.20 K/uL 0.05   Immature Grans (Abs) 0.00 - 0.04 K/uL 0.02   nRBC 0 /100 WBC 0   Differential Method  Automated   Sodium 136 - 145 mmol/L 144   Potassium 3.5 - 5.1 mmol/L 3.8   Chloride 95 - 110 mmol/L 107   CO2 23 - 29 mmol/L 24   Anion Gap 8 - 16 mmol/L 13   BUN 8 - 23 mg/dL 16   Creatinine 0.5 - 1.4 mg/dL 1.1   eGFR if non African American >60 mL/min/1.73 m^2 >60.0   eGFR if African American >60 mL/min/1.73 m^2 >60.0   Glucose 70 - 110 mg/dL 94   Calcium 8.7 - 10.5 mg/dL 9.6   Alkaline Phosphatase 55 - 135 U/L 105   PROTEIN TOTAL 6.0 - 8.4 g/dL 6.6   Albumin 3.5 - 5.2 g/dL 3.7   BILIRUBIN TOTAL 0.1 - 1.0 mg/dL 0.7   AST " 10 - 40 U/L 18   ALT 10 - 44 U/L 16   Cholesterol 120 - 199 mg/dL 104 (L)   HDL 40 - 75 mg/dL 37 (L)   HDL/Cholesterol Ratio 20.0 - 50.0 % 35.6   LDL Cholesterol External 63.0 - 159.0 mg/dL 36.6 (L)   Non-HDL Cholesterol mg/dL 67   Total Cholesterol/HDL Ratio 2.0 - 5.0  2.8   Triglycerides 30 - 150 mg/dL 152 (H)   (H): Data is abnormally high  (L): Data is abnormally low      Microbiology Data:   Reviewed       Summary of Chest Imaging Personally Reviewed:     CT Chest-     Biapical pleuroparenchymal changes and scarring with pulmonary emphysema.  Increasing size of right apical spiculated lesion measuring 2.2 x 2.0 cm concerning for neoplasm.  Further correlation with tissue sampling and/or PET-CT suggested.     Other subcentimeter nodules including a new appearing 0.5 cm right lower lobe nodule.  Continued close surveillance at a minimum.       NM PET- 1.  Single hypermetabolic, spiculated right upper lobe nodule, with imaging characteristics concerning for malignant etiology.  No evidence for pleural or mediastinal extension, as well as no evidence for hypermetabolic or enlarged mediastinal or hilar lymph nodes.     2.  Prostatomegaly with multiple dystrophic prostatic calcifications and heterogeneous regions of increased uptake.  Findings are nonspecific, and may be seen in the setting of prostatitis.  However, malignancy cannot be excluded.  Recommend correlation with physical examination and PSA and MRI of the prostate if clinically indicated.      · 2D Echo: The left ventricle is normal in size with mild concentric hypertrophy and mildly decreased systolic function.  · The estimated ejection fraction is 45%.  · Grade I left ventricular diastolic dysfunction.  · There are segmental left ventricular wall motion abnormalities.  · Normal central venous pressure (3 mmHg).  · The estimated PA systolic pressure is 29 mmHg.  · Mild tricuspid regurgitation.  · Normal right ventricular size with normal right  ventricular systolic function.        PFT's: None     Assessment:       No diagnosis found.    Outpatient Encounter Medications as of 5/24/2022   Medication Sig Dispense Refill    albuterol (PROVENTIL/VENTOLIN HFA) 90 mcg/actuation inhaler Inhale 2 puffs into the lungs every 6 (six) hours as needed for Wheezing. Rescue 18 g 2    ascorbic acid, vitamin C, (VITAMIN C) 500 MG tablet Take 500 mg by mouth once daily.      aspirin (ECOTRIN) 81 MG EC tablet Take 81 mg by mouth once daily.      atenoloL (TENORMIN) 25 MG tablet Take 1 tablet (25 mg total) by mouth once daily. 90 tablet 0    atorvastatin (LIPITOR) 40 MG tablet Take 1 tablet (40 mg total) by mouth once daily. 90 tablet 0    clopidogreL (PLAVIX) 75 mg tablet Take 1 tablet (75 mg total) by mouth once daily. 90 tablet 0    ergocalciferol (VITAMIN D2) 50,000 unit Cap TAKE 1 CAPSULE BY MOUTH  EVERY 14 DAYS 13 capsule 0    finasteride (PROSCAR) 5 mg tablet Take 1 tablet (5 mg total) by mouth once daily. 90 tablet 0    tamsulosin (FLOMAX) 0.4 mg Cap Take 1 capsule (0.4 mg total) by mouth once daily. 90 capsule 0    vit A/vit C/vit E/zinc/copper (ICAPS AREDS ORAL) Take by mouth 2 (two) times a day.       No facility-administered encounter medications on file as of 5/24/2022.     No orders of the defined types were placed in this encounter.      Plan:           1. RUL Pulmonary nodule- 2.2 x 2.0 cm SUV 11 on NM PET. No evidence of distant disease. No enlarged mediastinal or hilar nodes. Concern for malignancy. On ASA/Plavix, but no recent stents/PCI.    -- CT adequate   -- Hold ASA/Plavix x 7 days   -- Plan for robotic + EBUS for diagnosis/staging on May 31st    Discussed procedure in detail and all questions answered. Risk including PTX, bleeding, respiratory failure and numerous potential complications up to death explained. He verbalized understanding and in agreement to proceed. Written consent signed and on chart.     2. Tobacco dependency- Discussed  need for cessation- Not interested in quitting.     3. CAD- on DAPT + GDMT. Follows with cards. No angina or exertional limitation. Will hold plavix/ASA x 7 days to facilitate biopsy     4. COPD- Prn albuterol. Does not need. No exacerbation history MMRC 3. No e/o acute exacerbation on my exam. Not hypoxic. Continue management per primary pulmonologist Dr. Shelby.       Case request placed. General anesthesia consent on day of procedure. Instructions provided. Follow up to be determined after procedure completed.       Mickey Acosta M.D.   [facility]

## 2022-05-30 ENCOUNTER — TELEPHONE (OUTPATIENT)
Dept: PULMONOLOGY | Facility: CLINIC | Age: 82
End: 2022-05-30
Payer: MEDICARE

## 2022-05-30 ENCOUNTER — ANESTHESIA EVENT (OUTPATIENT)
Dept: SURGERY | Facility: HOSPITAL | Age: 82
End: 2022-05-30
Payer: MEDICARE

## 2022-05-30 NOTE — TELEPHONE ENCOUNTER
LVM for patient to return my call. I was calling with patient's arrival time to [facility] tomorrow for 7am for robotic bronchoscopy with Dr Acosta. Patient to call back to discuss.

## 2022-05-30 NOTE — TELEPHONE ENCOUNTER
I spoke with patient to let him know arrival time to [facility] tomorrow for 7am for robotic bronchoscopy with Dr Acosta. Patient confirmed and verbalized understanding.

## 2022-05-30 NOTE — ANESTHESIA PREPROCEDURE EVALUATION
Medicine [facility]  Anesthesia Pre-Operative Evaluation         Patient Name: Martell Whitaker  YOB: 1940  MRN: [id number]    SUBJECTIVE:     Pre-operative evaluation for Procedure(s) (LRB):  ROBOTIC BRONCHOSCOPY (N/A)     05/30/2022    Martell Whitaker is a 81 y.o. male w/ a significant PMHx of HTN, HLD, CAD s/p coronary angioplasty - no recent stents, h/o MI, COPD, AAA, b/l carotid stenosis, and RUL nodule who presents for the above procedure. 1 ppd smoker.     · 2D Echo: The left ventricle is normal in size with mild concentric hypertrophy and mildly decreased systolic function.  · The estimated ejection fraction is 45%.  · Grade I left ventricular diastolic dysfunction.  · There are segmental left ventricular wall motion abnormalities.  · Normal central venous pressure (3 mmHg).  · The estimated PA systolic pressure is 29 mmHg.  · Mild tricuspid regurgitation.  · Normal right ventricular size with normal right ventricular systolic function.      LDA: None documented.    Prev airway: None documented.     Drips: None documented.    Patient Active Problem List   Diagnosis    HTN (hypertension)    Hypercholesteremia    CAD (coronary artery disease)    Pulmonary nodule    Urine retention    Hx of myocardial infarction    Enlarged prostate    History of bladder cancer    Abdominal aortic aneurysm (AAA) without rupture    PVD (peripheral vascular disease)    Carotid stenosis, asymptomatic, bilateral    Hemoptysis    Centrilobular emphysema    Tobacco user    S/P PTCA (percutaneous transluminal coronary angioplasty)    Aortic arch atherosclerosis    History of endovascular stent graft for abdominal aortic aneurysm (AAA)    RBBB    Dyspnea on exertion    Localized edema       Review of patient's allergies indicates:  No Known Allergies    Current Inpatient Medications:      No current facility-administered medications on file prior to encounter.     Current Outpatient  Primary team Medications on File Prior to Encounter   Medication Sig Dispense Refill    albuterol (PROVENTIL/VENTOLIN HFA) 90 mcg/actuation inhaler Inhale 2 puffs into the lungs every 6 (six) hours as needed for Wheezing. Rescue 18 g 2    ascorbic acid, vitamin C, (VITAMIN C) 500 MG tablet Take 500 mg by mouth once daily.      aspirin (ECOTRIN) 81 MG EC tablet Take 81 mg by mouth once daily.      atenoloL (TENORMIN) 25 MG tablet Take 1 tablet (25 mg total) by mouth once daily. 90 tablet 0    atorvastatin (LIPITOR) 40 MG tablet Take 1 tablet (40 mg total) by mouth once daily. 90 tablet 0    clopidogreL (PLAVIX) 75 mg tablet Take 1 tablet (75 mg total) by mouth once daily. 90 tablet 0    ergocalciferol (VITAMIN D2) 50,000 unit Cap TAKE 1 CAPSULE BY MOUTH  EVERY 14 DAYS 13 capsule 0    finasteride (PROSCAR) 5 mg tablet Take 1 tablet (5 mg total) by mouth once daily. 90 tablet 0    tamsulosin (FLOMAX) 0.4 mg Cap Take 1 capsule (0.4 mg total) by mouth once daily. 90 capsule 0    vit A/vit C/vit E/zinc/copper (ICAPS AREDS ORAL) Take by mouth 2 (two) times a day.         Past Surgical History:   Procedure Laterality Date    ABDOMINAL AORTIC ANEURYSM REPAIR  2007    BLADDER SURGERY      CARDIAC CATHETERIZATION      CORONARY ANGIOPLASTY      CORONARY STENT PLACEMENT      FRACTURE SURGERY      arm    PROSTATE SURGERY      TONSILLECTOMY         Social History     Socioeconomic History    Marital status:    Occupational History    Occupation: retired    Tobacco Use    Smoking status: Current Every Day Smoker     Packs/day: 0.50     Years: 60.00     Pack years: 30.00     Types: Cigarettes    Smokeless tobacco: Never Used   Substance and Sexual Activity    Alcohol use: Yes     Alcohol/week: 1.0 standard drink     Types: 1 Shots of liquor per week     Comment:  five drink per week     Drug use: No    Sexual activity: Yes     Partners: Female     Birth control/protection: None       OBJECTIVE:     Vital Signs  Range (Last 24H):         CBC:   No results for input(s): WBC, RBC, HGB, HCT, PLT, MCV, MCH, MCHC in the last 72 hours.    CMP: No results for input(s): NA, K, CL, CO2, BUN, CREATININE, GLU, MG, PHOS, CALCIUM, ALBUMIN, PROT, ALKPHOS, ALT, AST, BILITOT in the last 72 hours.    INR:  No results for input(s): PT, INR, PROTIME, APTT in the last 72 hours.    Diagnostic Studies: No relevant studies.    EKG: No recent studies available.    2D ECHO:  No results found for this or any previous visit.      ASSESSMENT/PLAN:           Pre-op Assessment    I have reviewed the Patient Summary Reports.     I have reviewed the Nursing Notes.    I have reviewed the Medications.     Review of Systems  Cardiovascular:   Hypertension Past MI CAD  Dysrhythmias     Pulmonary:   COPD        Physical Exam  General: Well nourished, Alert, Oriented and Cooperative    Airway:  Mallampati: III / II  Mouth Opening: Normal  TM Distance: Normal  Tongue: Normal  Neck ROM: Normal ROM    Chest/Lungs:  Normal Respiratory Rate    Heart:  Rate: Normal        Anesthesia Plan  Type of Anesthesia, risks & benefits discussed:    Anesthesia Type: Gen ETT  Intra-op Monitoring Plan: Standard ASA Monitors  Post Op Pain Control Plan: multimodal analgesia  Induction:  IV  Airway Plan: Direct, Post-Induction  Informed Consent: Informed consent signed with the Patient and all parties understand the risks and agree with anesthesia plan.  All questions answered.   ASA Score: 4  Day of Surgery Review of History & Physical: H&P Update referred to the surgeon/provider.  Anesthesia Plan Notes: NPO confirmed.  Patient high-risk based upon heavy smoker, CAD (stents) and PVD.  Discussed case and plan with Dr. Karla Coleman For Surgery From Anesthesia Perspective.     .       primary team

## 2022-05-31 ENCOUNTER — ANESTHESIA (OUTPATIENT)
Dept: SURGERY | Facility: HOSPITAL | Age: 82
End: 2022-05-31
Payer: MEDICARE

## 2022-05-31 ENCOUNTER — HOSPITAL ENCOUNTER (OUTPATIENT)
Facility: HOSPITAL | Age: 82
Discharge: HOME | End: 2022-05-31
Attending: EMERGENCY MEDICINE | Admitting: EMERGENCY MEDICINE
Payer: MEDICARE

## 2022-05-31 VITALS
WEIGHT: 136 LBS | DIASTOLIC BLOOD PRESSURE: 59 MMHG | HEART RATE: 70 BPM | SYSTOLIC BLOOD PRESSURE: 100 MMHG | HEIGHT: 70 IN | TEMPERATURE: 96 F | RESPIRATION RATE: 16 BRPM | OXYGEN SATURATION: 100 % | BODY MASS INDEX: 19.47 KG/M2

## 2022-05-31 DIAGNOSIS — R91.8 LUNG MASS: ICD-10-CM

## 2022-05-31 PROCEDURE — 63600175 PHARM REV CODE 636 W HCPCS: Performed by: ANESTHESIOLOGY

## 2022-05-31 PROCEDURE — 25000003 PHARM REV CODE 250: Performed by: EMERGENCY MEDICINE

## 2022-05-31 PROCEDURE — 36000709 HC OR TIME LEV III EA ADD 15 MIN: Performed by: EMERGENCY MEDICINE

## 2022-05-31 PROCEDURE — 88172 CYTP DX EVAL FNA 1ST EA SITE: CPT | Mod: 26,,, | Performed by: PATHOLOGY

## 2022-05-31 PROCEDURE — 88112 CYTOPATH CELL ENHANCE TECH: CPT | Performed by: PATHOLOGY

## 2022-05-31 PROCEDURE — 25000003 PHARM REV CODE 250: Performed by: STUDENT IN AN ORGANIZED HEALTH CARE EDUCATION/TRAINING PROGRAM

## 2022-05-31 PROCEDURE — 71000033 HC RECOVERY, INTIAL HOUR: Performed by: EMERGENCY MEDICINE

## 2022-05-31 PROCEDURE — 71000015 HC POSTOP RECOV 1ST HR: Performed by: EMERGENCY MEDICINE

## 2022-05-31 PROCEDURE — 88341 PR IHC OR ICC EACH ADD'L SINGLE ANTIBODY  STAINPR: ICD-10-PCS | Mod: 26,,, | Performed by: PATHOLOGY

## 2022-05-31 PROCEDURE — 31627 PR BRONCHOSCOPY,COMPUTER ASSIST/IMAGE-GUIDED NAVIGATION: ICD-10-PCS | Mod: ,,, | Performed by: EMERGENCY MEDICINE

## 2022-05-31 PROCEDURE — 88305 TISSUE EXAM BY PATHOLOGIST: CPT | Mod: 26,,, | Performed by: PATHOLOGY

## 2022-05-31 PROCEDURE — 37000008 HC ANESTHESIA 1ST 15 MINUTES: Performed by: EMERGENCY MEDICINE

## 2022-05-31 PROCEDURE — 37000009 HC ANESTHESIA EA ADD 15 MINS: Performed by: EMERGENCY MEDICINE

## 2022-05-31 PROCEDURE — 88173 CYTOPATH EVAL FNA REPORT: CPT | Performed by: PATHOLOGY

## 2022-05-31 PROCEDURE — 88381 MICRODISSECTION MANUAL: CPT | Performed by: PATHOLOGY

## 2022-05-31 PROCEDURE — 88172 CYTP DX EVAL FNA 1ST EA SITE: CPT | Mod: 59 | Performed by: PATHOLOGY

## 2022-05-31 PROCEDURE — 36000708 HC OR TIME LEV III 1ST 15 MIN: Performed by: EMERGENCY MEDICINE

## 2022-05-31 PROCEDURE — 88342 CHG IMMUNOCYTOCHEMISTRY: ICD-10-PCS | Mod: 26,,, | Performed by: PATHOLOGY

## 2022-05-31 PROCEDURE — 81445 SO NEO GSAP 5-50DNA/DNA&RNA: CPT | Performed by: PATHOLOGY

## 2022-05-31 PROCEDURE — D9220A PRA ANESTHESIA: Mod: ,,, | Performed by: ANESTHESIOLOGY

## 2022-05-31 PROCEDURE — 31628 BRONCHOSCOPY/LUNG BX EACH: CPT | Mod: 51,RT,, | Performed by: EMERGENCY MEDICINE

## 2022-05-31 PROCEDURE — 31629 PR BRONCHOSCOPY,TRANSBRON ASPIR BX: ICD-10-PCS | Mod: 59,RT,, | Performed by: EMERGENCY MEDICINE

## 2022-05-31 PROCEDURE — 88112 CYTOPATH CELL ENHANCE TECH: CPT | Mod: 26,59,, | Performed by: PATHOLOGY

## 2022-05-31 PROCEDURE — 31654 PR BRONCH W/ EBUS, DIAG OR THERA INTERVENTION PERIPHERAL LESION(S), INCL GUID, ADD ON CODE: ICD-10-PCS | Mod: ,,, | Performed by: EMERGENCY MEDICINE

## 2022-05-31 PROCEDURE — 31652 BRONCH EBUS SAMPLNG 1/2 NODE: CPT | Mod: ,,, | Performed by: EMERGENCY MEDICINE

## 2022-05-31 PROCEDURE — 88360 TUMOR IMMUNOHISTOCHEM/MANUAL: CPT | Mod: 59 | Performed by: PATHOLOGY

## 2022-05-31 PROCEDURE — 88112 PR  CYTOPATH, CELL ENHANCE TECH: ICD-10-PCS | Mod: 26,59,, | Performed by: PATHOLOGY

## 2022-05-31 PROCEDURE — 25000242 PHARM REV CODE 250 ALT 637 W/ HCPCS: Performed by: EMERGENCY MEDICINE

## 2022-05-31 PROCEDURE — 31629 BRONCHOSCOPY/NEEDLE BX EACH: CPT | Mod: 59,RT,, | Performed by: EMERGENCY MEDICINE

## 2022-05-31 PROCEDURE — 25000003 PHARM REV CODE 250: Performed by: ANESTHESIOLOGY

## 2022-05-31 PROCEDURE — 31627 NAVIGATIONAL BRONCHOSCOPY: CPT | Mod: ,,, | Performed by: EMERGENCY MEDICINE

## 2022-05-31 PROCEDURE — 31654 BRONCH EBUS IVNTJ PERPH LES: CPT | Mod: ,,, | Performed by: EMERGENCY MEDICINE

## 2022-05-31 PROCEDURE — 31652 PR BRONCH W/ EBUS, SAMPLING 1 OR 2 NODES, INCL GUIDE: ICD-10-PCS | Mod: ,,, | Performed by: EMERGENCY MEDICINE

## 2022-05-31 PROCEDURE — 88305 TISSUE EXAM BY PATHOLOGIST: CPT | Mod: 59 | Performed by: PATHOLOGY

## 2022-05-31 PROCEDURE — 88177 CYTP FNA EVAL EA ADDL: CPT | Performed by: PATHOLOGY

## 2022-05-31 PROCEDURE — 88177 CYTP FNA EVAL EA ADDL: CPT | Mod: 26,,, | Performed by: PATHOLOGY

## 2022-05-31 PROCEDURE — 88177 PR  EVALUATION OF FNA SMEAR TO DETERMINE ADEQUACY, EA ADD EVAL: ICD-10-PCS | Mod: 26,,, | Performed by: PATHOLOGY

## 2022-05-31 PROCEDURE — C1726 CATH, BAL DIL, NON-VASCULAR: HCPCS | Performed by: EMERGENCY MEDICINE

## 2022-05-31 PROCEDURE — 88342 IMHCHEM/IMCYTCHM 1ST ANTB: CPT | Mod: 26,,, | Performed by: PATHOLOGY

## 2022-05-31 PROCEDURE — 88342 IMHCHEM/IMCYTCHM 1ST ANTB: CPT | Performed by: PATHOLOGY

## 2022-05-31 PROCEDURE — 88305 TISSUE EXAM BY PATHOLOGIST: ICD-10-PCS | Mod: 26,,, | Performed by: PATHOLOGY

## 2022-05-31 PROCEDURE — 88341 IMHCHEM/IMCYTCHM EA ADD ANTB: CPT | Performed by: PATHOLOGY

## 2022-05-31 PROCEDURE — D9220A PRA ANESTHESIA: ICD-10-PCS | Mod: ,,, | Performed by: ANESTHESIOLOGY

## 2022-05-31 PROCEDURE — 63600175 PHARM REV CODE 636 W HCPCS: Performed by: STUDENT IN AN ORGANIZED HEALTH CARE EDUCATION/TRAINING PROGRAM

## 2022-05-31 PROCEDURE — 88172 PR  EVALUATION OF FNA SMEAR TO DETERMINE ADEQUACY, FIRST EVAL: ICD-10-PCS | Mod: 26,,, | Performed by: PATHOLOGY

## 2022-05-31 PROCEDURE — 27201423 OPTIME MED/SURG SUP & DEVICES STERILE SUPPLY: Performed by: EMERGENCY MEDICINE

## 2022-05-31 PROCEDURE — 94640 AIRWAY INHALATION TREATMENT: CPT

## 2022-05-31 PROCEDURE — 31628 PR BRONCHOSCOPY,TRANSBRONCH BIOPSY: ICD-10-PCS | Mod: 51,RT,, | Performed by: EMERGENCY MEDICINE

## 2022-05-31 PROCEDURE — 88173 PR  INTERPRETATION OF FNA SMEAR: ICD-10-PCS | Mod: 26,,, | Performed by: PATHOLOGY

## 2022-05-31 PROCEDURE — 88341 IMHCHEM/IMCYTCHM EA ADD ANTB: CPT | Mod: 26,,, | Performed by: PATHOLOGY

## 2022-05-31 PROCEDURE — 88173 CYTOPATH EVAL FNA REPORT: CPT | Mod: 26,,, | Performed by: PATHOLOGY

## 2022-05-31 RX ORDER — FENTANYL CITRATE 50 UG/ML
INJECTION, SOLUTION INTRAMUSCULAR; INTRAVENOUS
Status: DISCONTINUED | OUTPATIENT
Start: 2022-05-31 | End: 2022-05-31

## 2022-05-31 RX ORDER — ONDANSETRON 2 MG/ML
INJECTION INTRAMUSCULAR; INTRAVENOUS
Status: DISCONTINUED | OUTPATIENT
Start: 2022-05-31 | End: 2022-05-31

## 2022-05-31 RX ORDER — PROPOFOL 10 MG/ML
VIAL (ML) INTRAVENOUS
Status: DISCONTINUED | OUTPATIENT
Start: 2022-05-31 | End: 2022-05-31

## 2022-05-31 RX ORDER — SODIUM CHLORIDE 0.9 % (FLUSH) 0.9 %
10 SYRINGE (ML) INJECTION
Status: DISCONTINUED | OUTPATIENT
Start: 2022-05-31 | End: 2022-05-31 | Stop reason: HOSPADM

## 2022-05-31 RX ORDER — DEXAMETHASONE SODIUM PHOSPHATE 4 MG/ML
INJECTION, SOLUTION INTRA-ARTICULAR; INTRALESIONAL; INTRAMUSCULAR; INTRAVENOUS; SOFT TISSUE
Status: DISCONTINUED | OUTPATIENT
Start: 2022-05-31 | End: 2022-05-31

## 2022-05-31 RX ORDER — FENTANYL CITRATE 50 UG/ML
25 INJECTION, SOLUTION INTRAMUSCULAR; INTRAVENOUS EVERY 5 MIN PRN
Status: DISCONTINUED | OUTPATIENT
Start: 2022-05-31 | End: 2022-05-31 | Stop reason: HOSPADM

## 2022-05-31 RX ORDER — ROCURONIUM BROMIDE 10 MG/ML
INJECTION, SOLUTION INTRAVENOUS
Status: DISCONTINUED | OUTPATIENT
Start: 2022-05-31 | End: 2022-05-31

## 2022-05-31 RX ORDER — IPRATROPIUM BROMIDE AND ALBUTEROL SULFATE 2.5; .5 MG/3ML; MG/3ML
3 SOLUTION RESPIRATORY (INHALATION) EVERY 6 HOURS
Status: DISCONTINUED | OUTPATIENT
Start: 2022-05-31 | End: 2022-05-31

## 2022-05-31 RX ORDER — LIDOCAINE HYDROCHLORIDE 10 MG/ML
INJECTION, SOLUTION EPIDURAL; INFILTRATION; INTRACAUDAL; PERINEURAL
Status: DISCONTINUED | OUTPATIENT
Start: 2022-05-31 | End: 2022-05-31 | Stop reason: HOSPADM

## 2022-05-31 RX ORDER — ONDANSETRON 2 MG/ML
4 INJECTION INTRAMUSCULAR; INTRAVENOUS DAILY PRN
Status: DISCONTINUED | OUTPATIENT
Start: 2022-05-31 | End: 2022-05-31 | Stop reason: HOSPADM

## 2022-05-31 RX ORDER — LIDOCAINE HYDROCHLORIDE 20 MG/ML
INJECTION INTRAVENOUS
Status: DISCONTINUED | OUTPATIENT
Start: 2022-05-31 | End: 2022-05-31

## 2022-05-31 RX ORDER — SODIUM CHLORIDE 9 MG/ML
INJECTION, SOLUTION INTRAVENOUS CONTINUOUS PRN
Status: DISCONTINUED | OUTPATIENT
Start: 2022-05-31 | End: 2022-05-31

## 2022-05-31 RX ORDER — HYDROMORPHONE HYDROCHLORIDE 1 MG/ML
0.2 INJECTION, SOLUTION INTRAMUSCULAR; INTRAVENOUS; SUBCUTANEOUS EVERY 5 MIN PRN
Status: DISCONTINUED | OUTPATIENT
Start: 2022-05-31 | End: 2022-05-31 | Stop reason: HOSPADM

## 2022-05-31 RX ADMIN — IPRATROPIUM BROMIDE AND ALBUTEROL SULFATE 3 ML: 2.5; .5 SOLUTION RESPIRATORY (INHALATION) at 11:05

## 2022-05-31 RX ADMIN — SODIUM CHLORIDE: 0.9 INJECTION, SOLUTION INTRAVENOUS at 09:05

## 2022-05-31 RX ADMIN — ROCURONIUM BROMIDE 10 MG: 10 INJECTION, SOLUTION INTRAVENOUS at 10:05

## 2022-05-31 RX ADMIN — DEXAMETHASONE SODIUM PHOSPHATE 4 MG: 4 INJECTION, SOLUTION INTRAMUSCULAR; INTRAVENOUS at 09:05

## 2022-05-31 RX ADMIN — ONDANSETRON 4 MG: 2 INJECTION INTRAMUSCULAR; INTRAVENOUS at 11:05

## 2022-05-31 RX ADMIN — FENTANYL CITRATE 50 MCG: 50 INJECTION, SOLUTION INTRAMUSCULAR; INTRAVENOUS at 11:05

## 2022-05-31 RX ADMIN — FENTANYL CITRATE 50 MCG: 50 INJECTION, SOLUTION INTRAMUSCULAR; INTRAVENOUS at 09:05

## 2022-05-31 RX ADMIN — PROPOFOL 75 MCG/KG/MIN: 10 INJECTION, EMULSION INTRAVENOUS at 09:05

## 2022-05-31 RX ADMIN — LIDOCAINE HYDROCHLORIDE 50 MG: 20 INJECTION, SOLUTION INTRAVENOUS at 09:05

## 2022-05-31 RX ADMIN — ROCURONIUM BROMIDE 10 MG: 10 INJECTION, SOLUTION INTRAVENOUS at 11:05

## 2022-05-31 RX ADMIN — ROCURONIUM BROMIDE 40 MG: 10 INJECTION, SOLUTION INTRAVENOUS at 09:05

## 2022-05-31 RX ADMIN — PROPOFOL 100 MG: 10 INJECTION, EMULSION INTRAVENOUS at 09:05

## 2022-05-31 RX ADMIN — SUGAMMADEX 200 MG: 100 INJECTION, SOLUTION INTRAVENOUS at 11:05

## 2022-05-31 NOTE — DISCHARGE INSTRUCTIONS
Endoscopic Diagnosis of Chest, Lung Problems  Youve been told you need an endoscopic procedure to diagnose a problem in your chest or lung. This procedure allows your healthcare provider to view the airway of your lungs and take a tissue sample (biopsy) or treat a lung condition, if needed.     With bronchoscopy, a flexible scope allows the healthcare provider to view and biopsy the airway.   Bronchoscopy  A bronchoscopy allows the healthcare provider to look directly into the airways in your lungs. This is done using a bronchoscope. A bronchoscope is a thin, flexible, hollow, lighted tube that lets the doctor see inside the lung. Tools can be passed down the middle of the scope.  Transbronchial biopsy  Transbronchial biopsy (TBB) is a procedure used mainly to take samples of tissue near the airway. This is done using a bronchoscope and tiny forceps. The forceps are passed through the scope into the airway, and a sample is taken.  Endobronchial ultrasound  Endobronchial ultrasound (EBUS) is a type of bronchoscopy. With EBUS, the lungs and the space between the lungs (mediastinum) are looked at using a flexible bronchoscope and ultrasound (images created using sound waves). Ultrasound guides the healthcare provider and allows him or her to see through the airway walls.  Preparing for the procedure  Before your procedure, do the following:  Follow your healthcare providers instructions about eating and drinking.  Tell your healthcare provider about the medicines you take. You may need to stop taking some of them before the procedure, especially aspirin, Coumadin, or other blood thinners.  Talk with your healthcare provider about any allergies and health problems you have.  Tell your healthcare provider if you are pregnant.  During the procedure  You will get medicine through an intravenous (IV) line to help you relax and sleep during the procedure. You may also receive local anesthesia (numbing medicine) with a  needle. Then a special spray is used to numb your throat and nose or mouth. This is to help keep you comfortable and prevent coughing during the procedure.  After the procedure  You are sent to the recovery room until the sedation wears off. This takes about 1 to 2 hours. Once you are fully awake, you can go home. You will need an adult family member or friend to drive you home. Your throat will be sore for a day or two. At first, there may be a small amount of blood in your sputum. This is normal. It should go away after the second day.  Risks and complications  Bleeding  Infection  Injury to vocal cords  Pneumothorax (collapsed lung)   When to call your healthcare provider  Large amounts of blood in sputum  Blood in sputum after 2 days  Shortness of breath  Chest pain  Fever of 100.4ºF (38°C) or higher, or as directed by your healthcare provider  Hoarseness that wont go away   Date Last Reviewed: 11/1/2016  © 8586-7532 [employer]. [address] [place] [address], [place] [address]. All rights reserved. This information is not intended as a substitute for professional medical care. Always follow your healthcare professional's instructions.

## 2022-05-31 NOTE — PROGRESS NOTES
Patient meets requirements for next phase of care. No acute complaints of nausea, pain, or dyspnea. Family notified.

## 2022-05-31 NOTE — ANESTHESIA POSTPROCEDURE EVALUATION
Anesthesia Post Evaluation    Patient: Martell Whitaker    Procedure(s) Performed: Procedure(s) (LRB):  ROBOTIC BRONCHOSCOPY (N/A)    Final Anesthesia Type: general      Patient location during evaluation: PACU  Patient participation: Yes- Able to Participate  Level of consciousness: awake and alert  Post-procedure vital signs: reviewed and stable  Pain management: adequate  Airway patency: patent    PONV status at discharge: No PONV  Anesthetic complications: no      Cardiovascular status: blood pressure returned to baseline  Respiratory status: unassisted  Hydration status: euvolemic  Follow-up not needed.          Vitals Value Taken Time   /54 05/31/22 1201   Temp 36.8 °C (98.2 °F) 05/31/22 1132   Pulse 65 05/31/22 1215   Resp 12 05/31/22 1215   SpO2 91 % 05/31/22 1215   Vitals shown include unvalidated device data.      No case tracking events are documented in the log.      Pain/Abiola Score: No data recorded

## 2022-05-31 NOTE — NURSING TRANSFER
Nursing Transfer Note      5/31/2022     Reason patient is being transferred: meets criteria for [facility]    Transfer To: [facility]    Transfer via stretcher    Transfer with IV fluids    Transported by Radha ZAPATA    Medicines sent: IV fluids    Any special needs or follow-up needed: none    Chart send with patient: Yes    Notified: granddaughter    Patient reassessed at: 5/31

## 2022-05-31 NOTE — ANESTHESIA PROCEDURE NOTES
Intubation    Date/Time: 5/31/2022 9:46 AM  Performed by: Kevin Mayo MD  Authorized by: Walker Faust MD     Intubation:     Induction:  Intravenous    Intubated:  Postinduction    Mask Ventilation:  Easy mask    Attempts:  1    Attempted By:  Resident anesthesiologist    Method of Intubation:  Direct    Blade:  Benites 2    Laryngeal View Grade: Grade I - full view of cords      Difficult Airway Encountered?: No      Complications:  None    Airway Device:  Oral endotracheal tube    Airway Device Size:  8.0    Style/Cuff Inflation:  Cuffed (inflated to minimal occlusive pressure)    Inflation Amount (mL):  8    Tube secured:  22    Secured at:  The lips    Placement Verified By:  Capnometry    Complicating Factors:  None    Findings Post-Intubation:  BS equal bilateral and atraumatic/condition of teeth unchanged

## 2022-05-31 NOTE — TRANSFER OF CARE
"Anesthesia Transfer of Care Note    Patient: Martell Whitaker    Procedure(s) Performed: Procedure(s) (LRB):  ROBOTIC BRONCHOSCOPY (N/A)    Patient location: PACU    Anesthesia Type: general    Transport from OR: Transported from OR on 6-10 L/min O2 by face mask with adequate spontaneous ventilation    Post pain: adequate analgesia    Post assessment: no apparent anesthetic complications    Post vital signs: stable    Level of consciousness: responds to stimulation    Nausea/Vomiting: no nausea/vomiting    Complications: none    Transfer of care protocol was followed      Last vitals:   Visit Vitals  /86 (BP Location: Right arm, Patient Position: Lying)   Pulse 71   Temp 36.8 °C (98.2 °F) (Temporal)   Resp 20   Ht 5' 10" (1.778 m)   Wt 61.7 kg (136 lb)   SpO2 100%   BMI 19.51 kg/m²     "

## 2022-05-31 NOTE — DISCHARGE SUMMARY
[facility] - Surgery (2nd Fl)  Discharge Note  Short Stay    Procedure(s) (LRB):  ROBOTIC BRONCHOSCOPY (N/A)    OUTCOME: Patient tolerated treatment/procedure well without complication and is now ready for discharge.     HOSPITAL COURSE:   Bronchoscopy completed. No endobronchial lesions or masses. Normal Anatomy.   Robotic navigation completed. FNA + TBBx RUL nodule.   EBUS completed. Station 11L and 11R both 1cm- FNA x 3 at both sites.   No additional pathologically enlarged LNs identified.     POST PROCEDURE CXR: No PTX. No complication.     DISPOSITION: Home or Self Care    FINAL DIAGNOSIS:  Lung nodule RUL       FOLLOWUP: In clinic. I will call in the interim when results are available.     DISCHARGE INSTRUCTIONS:  Written and verbal instructions provided.     TIME SPENT ON DISCHARGE: 25 minutes    Mickey Acosta M.D.   [facility]

## 2022-05-31 NOTE — BRIEF OP NOTE
Bronchoscopy completed. No endobronchial lesions or masses. Normal Anatomy.   Robotic navigation completed. FNA + TBBx RUL nodule.   EBUS completed. Station 11L and 11R both 1cm- FNA x 3 at both sites.   No additional pathologically enlarged LNs identified.   Bleeding controlled. No immediate complication.     Updated family post procedure. Full dictation to follow..       Mickey Acosta M.D.   [facility]

## 2022-05-31 NOTE — INTERVAL H&P NOTE
The patient has been examined and the H&P has been reviewed:    I concur with the findings and no changes have occurred since H&P was written.    Procedure risks, benefits and alternative options discussed and understood by patient/family.    Diagnosis- Lung nodule- RUL.     PLAN: Robotic + Staging EBUS. No hypermetabolic nodes on NM PET.       Discussed procedure with patient and family. All questions answered. Consent obtained in clinic.       Mickey Acosta M.D.  Pulmonary/Critical Care Medicine

## 2022-06-08 ENCOUNTER — TELEPHONE (OUTPATIENT)
Dept: PULMONOLOGY | Facility: CLINIC | Age: 82
End: 2022-06-08
Payer: MEDICARE

## 2022-06-08 NOTE — TELEPHONE ENCOUNTER
----- Message from Soren Urena sent at 6/8/2022  9:01 AM CDT -----  Contact: Patient  Patient requesting call back in regards to Biopsy results.       Patient @[phone] ([facility])

## 2022-06-08 NOTE — TELEPHONE ENCOUNTER
I spoke with patient to let him know results are still in process. When results are final Dr Acosta will call to discuss. Patient verbalized understanding.

## 2022-06-09 ENCOUNTER — TELEPHONE (OUTPATIENT)
Dept: CARDIOTHORACIC SURGERY | Facility: CLINIC | Age: 82
End: 2022-06-09
Payer: MEDICARE

## 2022-06-09 DIAGNOSIS — C34.91 ADENOCARCINOMA OF RIGHT LUNG: Primary | ICD-10-CM

## 2022-06-09 NOTE — TELEPHONE ENCOUNTER
Oncology nurse navigator contacted patient for scheduling of PFTs and six minute walk, patient agreed to Monday 6/13, 9am,10:20am. Thoracic surgery and radiation oncology appointments scheduled for 6/15 8am/9am. Dates, times, locations discussed with patient. Verbalized understanding. Patient provided direct contact information for nurse navigator for any additional questions/concerns.

## 2022-06-09 NOTE — PROGRESS NOTES
Biopsy results reviewed-   2.1 x 1.8 cm nodule in right apex  with SUV max of 11.   Robotic FNA and Tbbx + for NSCLC (adenocarcinoma)   EBUS- Lymph nodes negative.      Discussed results with him by phone. Will refer to surgery/onc/rad-onc.   Needs PFTs and 6MWT       Mickey Acosta M.D.   [facility]

## 2022-06-13 ENCOUNTER — HOSPITAL ENCOUNTER (OUTPATIENT)
Dept: PULMONOLOGY | Facility: CLINIC | Age: 82
Discharge: HOME | End: 2022-06-13
Payer: MEDICARE

## 2022-06-13 VITALS — BODY MASS INDEX: 22.92 KG/M2 | HEIGHT: 65 IN | WEIGHT: 137.56 LBS

## 2022-06-13 DIAGNOSIS — C34.91 ADENOCARCINOMA OF RIGHT LUNG: ICD-10-CM

## 2022-06-13 LAB
DLCO SINGLE BREATH LLN: 16.01
DLCO SINGLE BREATH PRE REF: 34 %
DLCO SINGLE BREATH REF: 22.94
DLCOC SBVA LLN: 2.22
DLCOC SBVA REF: 3.41
DLCOC SINGLE BREATH LLN: 16.01
DLCOC SINGLE BREATH REF: 22.94
DLCOCSBVAULN: 4.6
DLCOCSINGLEBREATHULN: 29.86
DLCOSINGLEBREATHULN: 29.86
DLCOVA LLN: 2.22
DLCOVA PRE REF: 37.3 %
DLCOVA PRE: 1.27 ML/(MIN*MMHG*L) (ref 2.22–4.6)
DLCOVA REF: 3.41
DLCOVAULN: 4.6
FEF 25 75 LLN: 0.65
FEF 25 75 PRE REF: 39.5 %
FEF 25 75 REF: 1.81
FET100 CHG: 4.8 %
FEV05 LLN: 1.1
FEV05 REF: 2.23
FEV1 CHG: 14.3 %
FEV1 FVC LLN: 59
FEV1 FVC PRE REF: 74.2 %
FEV1 FVC REF: 75
FEV1 LLN: 1.77
FEV1 PRE REF: 64.9 %
FEV1 REF: 2.6
FEV1 VOL CHG: 0.24
FVC CHG: 21.9 %
FVC LLN: 2.51
FVC PRE REF: 86.6 %
FVC REF: 3.52
FVC VOL CHG: 0.67
IVC PRE: 3.46 L (ref 2.51–4.54)
IVC SINGLE BREATH LLN: 2.51
IVC SINGLE BREATH PRE REF: 98.3 %
IVC SINGLE BREATH REF: 3.52
IVCSINGLEBREATHULN: 4.54
PEF LLN: 4.22
PEF PRE REF: 65 %
PEF REF: 6.4
PHYSICIAN COMMENT: ABNORMAL
POST FEF 25 75: 0.72 L/S (ref 0.65–3.56)
POST FET 100: 6.99 SEC
POST FEV1 FVC: 51.87 % (ref 59.25–88.56)
POST FEV1: 1.93 L (ref 1.77–3.36)
POST FEV5: 1.4 L (ref 1.1–3.37)
POST FVC: 3.72 L (ref 2.51–4.54)
POST PEF: 4.71 L/S (ref 4.22–8.59)
PRE DLCO: 7.8 ML/(MIN*MMHG) (ref 16.01–29.86)
PRE FEF 25 75: 0.72 L/S (ref 0.65–3.56)
PRE FET 100: 6.66 SEC
PRE FEV05 REF: 53.4 %
PRE FEV1 FVC: 55.31 % (ref 59.25–88.56)
PRE FEV1: 1.69 L (ref 1.77–3.36)
PRE FEV5: 1.19 L (ref 1.1–3.37)
PRE FVC: 3.05 L (ref 2.51–4.54)
PRE PEF: 4.16 L/S (ref 4.22–8.59)
VA PRE: 6.13 L (ref 6.57–6.57)
VA SINGLE BREATH LLN: 6.57
VA SINGLE BREATH PRE REF: 93.3 %
VA SINGLE BREATH REF: 6.57
VASINGLEBREATHULN: 6.57

## 2022-06-13 PROCEDURE — 94060 PR EVAL OF BRONCHOSPASM: ICD-10-PCS | Mod: 59,S$GLB,, | Performed by: INTERNAL MEDICINE

## 2022-06-13 PROCEDURE — 94060 EVALUATION OF WHEEZING: CPT | Mod: 59,S$GLB,, | Performed by: INTERNAL MEDICINE

## 2022-06-13 PROCEDURE — 94729 PR C02/MEMBANE DIFFUSE CAPACITY: ICD-10-PCS | Mod: S$GLB,,, | Performed by: INTERNAL MEDICINE

## 2022-06-13 PROCEDURE — 94618 PULMONARY STRESS TESTING: CPT | Mod: S$GLB,,, | Performed by: INTERNAL MEDICINE

## 2022-06-13 PROCEDURE — 94618 PULMONARY STRESS TESTING: ICD-10-PCS | Mod: S$GLB,,, | Performed by: INTERNAL MEDICINE

## 2022-06-13 PROCEDURE — 94729 DIFFUSING CAPACITY: CPT | Mod: S$GLB,,, | Performed by: INTERNAL MEDICINE

## 2022-06-14 NOTE — PROCEDURES
Martell Whitaker is a 81 y.o.  male patient, who presents for a 6 minute walk test ordered by MD Lillian.  The diagnosis is Shortness of Breath.  The patient's BMI is 22.9 kg/m2.  Predicted distance (lower limit of normal) is 282.51 meters.      Test Results:    The test was completed without stopping.    The total time walked was 360 seconds.  During walking, the patient reported:  Dyspnea, Leg pain. The patient used no assistive devices  during testing.     06/13/2022---------Distance: 396.24 meters (1300 feet)     O2 Sat % Supplemental Oxygen Heart Rate Blood Pressure Lauren Scale   Pre-exercise  (Resting) 97 % Room Air 50 bpm 136/63 mmHg 2   During Exercise 96 % Room Air 71 bpm 147/67 mmHg 4   Post-exercise  (Recovery) 98 % Room Air  64 bpm   mmHg       Recovery Time: 85 seconds    Performing nurse/tech: ANDERSON Loja      PREVIOUS STUDY:   The patient has not had a previous study.      CLINICAL INTERPRETATION:  Six minute walk distance is 396.24 meters (1300 feet) with somewhat heavy dyspnea.  During exercise, there was no significant desaturation while breathing room air.  Both blood pressure and heart rate remained stable with walking.  Bradycardia was present prior to exercise.  The patient reported non-pulmonary symptoms during exercise.  No previous study performed.  Based upon age and body mass index, exercise capacity is normal.

## 2022-06-15 ENCOUNTER — OFFICE VISIT (OUTPATIENT)
Dept: RADIATION ONCOLOGY | Facility: CLINIC | Age: 82
End: 2022-06-15
Payer: MEDICARE

## 2022-06-15 ENCOUNTER — DOCUMENTATION ONLY (OUTPATIENT)
Dept: CARDIOTHORACIC SURGERY | Facility: CLINIC | Age: 82
End: 2022-06-15
Payer: MEDICARE

## 2022-06-15 ENCOUNTER — TELEPHONE (OUTPATIENT)
Dept: RADIATION ONCOLOGY | Facility: CLINIC | Age: 82
End: 2022-06-15
Payer: MEDICARE

## 2022-06-15 VITALS
TEMPERATURE: 98 F | BODY MASS INDEX: 19.44 KG/M2 | OXYGEN SATURATION: 97 % | RESPIRATION RATE: 18 BRPM | DIASTOLIC BLOOD PRESSURE: 64 MMHG | SYSTOLIC BLOOD PRESSURE: 148 MMHG | HEART RATE: 61 BPM | WEIGHT: 138.88 LBS | HEIGHT: 71 IN

## 2022-06-15 DIAGNOSIS — C34.11 PRIMARY MALIGNANT NEOPLASM OF RIGHT UPPER LOBE OF LUNG: Primary | ICD-10-CM

## 2022-06-15 DIAGNOSIS — C34.91 ADENOCARCINOMA OF RIGHT LUNG: ICD-10-CM

## 2022-06-15 PROCEDURE — 3288F PR FALLS RISK ASSESSMENT DOCUMENTED: ICD-10-PCS | Mod: CPTII,S$GLB,, | Performed by: RADIOLOGY

## 2022-06-15 PROCEDURE — 3078F PR MOST RECENT DIASTOLIC BLOOD PRESSURE < 80 MM HG: ICD-10-PCS | Mod: CPTII,S$GLB,, | Performed by: RADIOLOGY

## 2022-06-15 PROCEDURE — 1101F PT FALLS ASSESS-DOCD LE1/YR: CPT | Mod: CPTII,S$GLB,, | Performed by: RADIOLOGY

## 2022-06-15 PROCEDURE — 3078F DIAST BP <80 MM HG: CPT | Mod: CPTII,S$GLB,, | Performed by: RADIOLOGY

## 2022-06-15 PROCEDURE — 99999 PR PBB SHADOW E&M-EST. PATIENT-LVL IV: ICD-10-PCS | Mod: PBBFAC,,, | Performed by: RADIOLOGY

## 2022-06-15 PROCEDURE — 1159F PR MEDICATION LIST DOCUMENTED IN MEDICAL RECORD: ICD-10-PCS | Mod: CPTII,S$GLB,, | Performed by: RADIOLOGY

## 2022-06-15 PROCEDURE — 3077F SYST BP >= 140 MM HG: CPT | Mod: CPTII,S$GLB,, | Performed by: RADIOLOGY

## 2022-06-15 PROCEDURE — 1101F PR PT FALLS ASSESS DOC 0-1 FALLS W/OUT INJ PAST YR: ICD-10-PCS | Mod: CPTII,S$GLB,, | Performed by: RADIOLOGY

## 2022-06-15 PROCEDURE — 99205 PR OFFICE/OUTPT VISIT, NEW, LEVL V, 60-74 MIN: ICD-10-PCS | Mod: S$GLB,,, | Performed by: RADIOLOGY

## 2022-06-15 PROCEDURE — 1126F PR PAIN SEVERITY QUANTIFIED, NO PAIN PRESENT: ICD-10-PCS | Mod: CPTII,S$GLB,, | Performed by: RADIOLOGY

## 2022-06-15 PROCEDURE — 3077F PR MOST RECENT SYSTOLIC BLOOD PRESSURE >= 140 MM HG: ICD-10-PCS | Mod: CPTII,S$GLB,, | Performed by: RADIOLOGY

## 2022-06-15 PROCEDURE — 99999 PR PBB SHADOW E&M-EST. PATIENT-LVL IV: CPT | Mod: PBBFAC,,, | Performed by: RADIOLOGY

## 2022-06-15 PROCEDURE — 1157F ADVNC CARE PLAN IN RCRD: CPT | Mod: CPTII,S$GLB,, | Performed by: RADIOLOGY

## 2022-06-15 PROCEDURE — 1126F AMNT PAIN NOTED NONE PRSNT: CPT | Mod: CPTII,S$GLB,, | Performed by: RADIOLOGY

## 2022-06-15 PROCEDURE — 3288F FALL RISK ASSESSMENT DOCD: CPT | Mod: CPTII,S$GLB,, | Performed by: RADIOLOGY

## 2022-06-15 PROCEDURE — 99205 OFFICE O/P NEW HI 60 MIN: CPT | Mod: S$GLB,,, | Performed by: RADIOLOGY

## 2022-06-15 PROCEDURE — 1159F MED LIST DOCD IN RCRD: CPT | Mod: CPTII,S$GLB,, | Performed by: RADIOLOGY

## 2022-06-15 PROCEDURE — 1157F PR ADVANCE CARE PLAN OR EQUIV PRESENT IN MEDICAL RECORD: ICD-10-PCS | Mod: CPTII,S$GLB,, | Performed by: RADIOLOGY

## 2022-06-15 NOTE — PROGRESS NOTES
06/15/2022    Radiation Oncology Consultation    Assessment   This is a 81 y.o. 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 PET/CT 5/9/22 demonstrated uptake in RUL primary only. He is referred for consideration of treatment options.    I discussed treatment options for early stage NSCLC including surgical resection and stereotactic body radiotherapy (SBRT). He is not a candidate for surgery due to concern for pulmonary function. I recommend treating the RUL primary to 50-55 Gy in 5 fractions, which offers local control in 90% of patients. Potential short- and long-term risks of treatment were reviewed with the patient, particularly pneumonitis and chest wall pain. At the end of our discussion, he wished to proceed with the recommended treatment.         Plan   1) Schedule CT Simulation for SBRT lung treatment planning.   2) We discussed smoking cessation. He is not interested in quitting at this time but does plan to try cutting back. He has previously tried Chantix but reports it caused aggressive behavior, so he would not take it again. Nicotine replacements did not help in the past.          CHIEF COMPLAINT: Early-stage NSCLC    HPI: Mr. Whitaker is an 82 y/o male smoker w/ COPD who was found to have an enlarging 2.2 cm RUL nodule on CT Chest 4/13/22. It was present on CT Chest 7/14/21 and measured 1.0 cm at the time. PET/CT 5/9/22 demonstrated uptake in the RUL lesion only. He underwent robotic bronch with EBUS by Dr. Acosta with pathology revealing PD-adenocarcinoma in the RUL primary and no malignangy cells in 11L/R lymph nodes. He is referred for consideration of treatment options.    In clinic today, he is accompanied by his granddaughter. He reports mild dry cough. He denies fevers, weight loss, chest pain, or dyspnea.       Prior Radiation History: None    Past Medical History:   Diagnosis Date    AAA (abdominal aortic aneurysm) without  rupture     pt with h/o aaa     Acute coronary syndrome     Cancer     bladder    Carotid artery occlusion     COPD (chronic obstructive pulmonary disease)     Coronary artery disease     Enlarged prostate     History of bladder cancer     Hyperlipidemia     Hypertension     Lung abnormality     spot on lung under care of pulmonologist at [facility]    Myocardial infarct 1988    Urine retention        Past Surgical History:   Procedure Laterality Date    ABDOMINAL AORTIC ANEURYSM REPAIR  2007    BLADDER SURGERY      CARDIAC CATHETERIZATION      CORONARY ANGIOPLASTY      CORONARY STENT PLACEMENT      FRACTURE SURGERY      arm    PROSTATE SURGERY      ROBOTIC BRONCHOSCOPY N/A 5/31/2022    Procedure: ROBOTIC BRONCHOSCOPY;  Surgeon: Mickey Acosta MD;  Location: [facility] OR [address] [place] [address];  Service: Pulmonary;  Laterality: N/A;    TONSILLECTOMY         Social History     Tobacco Use    Smoking status: Current Every Day Smoker     Packs/day: 0.50     Years: 60.00     Pack years: 30.00     Types: Cigarettes    Smokeless tobacco: Never Used   Substance Use Topics    Alcohol use: Yes     Alcohol/week: 1.0 standard drink     Types: 1 Shots of liquor per week     Comment:  five drink per week     Drug use: No       Cancer-related family history includes Cancer in his father.    Current Outpatient Medications on File Prior to Visit   Medication Sig Dispense Refill    ascorbic acid, vitamin C, (VITAMIN C) 500 MG tablet Take 500 mg by mouth once daily.      aspirin (ECOTRIN) 81 MG EC tablet Take 81 mg by mouth once daily.      atenoloL (TENORMIN) 25 MG tablet Take 1 tablet (25 mg total) by mouth once daily. 90 tablet 0    atorvastatin (LIPITOR) 40 MG tablet Take 1 tablet (40 mg total) by mouth once daily. 90 tablet 0    clopidogreL (PLAVIX) 75 mg tablet Take 1 tablet (75 mg total) by mouth once daily. 90 tablet 0    ergocalciferol (VITAMIN D2) 50,000 unit Cap TAKE 1 CAPSULE BY MOUTH  EVERY 14  "DAYS 13 capsule 0    finasteride (PROSCAR) 5 mg tablet Take 1 tablet (5 mg total) by mouth once daily. 90 tablet 0    tamsulosin (FLOMAX) 0.4 mg Cap Take 1 capsule (0.4 mg total) by mouth once daily. 90 capsule 0    vit A/vit C/vit E/zinc/copper (ICAPS AREDS ORAL) Take by mouth 2 (two) times a day.      albuterol (PROVENTIL/VENTOLIN HFA) 90 mcg/actuation inhaler Inhale 2 puffs into the lungs every 6 (six) hours as needed for Wheezing. Rescue (Patient not taking: Reported on 6/15/2022) 18 g 2     No current facility-administered medications on file prior to visit.       Review of patient's allergies indicates:  No Known Allergies    Review of Systems   Constitutional: Negative for fever and weight loss.   HENT: Positive for hearing loss. Negative for ear pain and sore throat.    Eyes: Negative for blurred vision and double vision.   Respiratory: Positive for cough. Negative for hemoptysis and shortness of breath.    Cardiovascular: Negative for chest pain and leg swelling.   Gastrointestinal: Negative for abdominal pain, constipation, diarrhea, heartburn and nausea.   Genitourinary: Negative for dysuria and hematuria.   Musculoskeletal: Negative for falls and joint pain.   Neurological: Negative for tingling, speech change, focal weakness, seizures and headaches.   Psychiatric/Behavioral: Negative for depression. The patient is not nervous/anxious.         Vital Signs: BP (!) 148/64 (BP Location: Right arm)   Pulse 61   Temp 98 °F (36.7 °C) (Skin)   Resp 18   Ht 5' 11" (1.803 m)   Wt 63 kg (138 lb 14.2 oz)   SpO2 97%   BMI 19.37 kg/m²     ECOG Performance Status: 2 - Ambulates, capable of self care only    Physical Exam  Vitals reviewed.   Constitutional:       Appearance: Normal appearance.   HENT:      Head: Normocephalic and atraumatic.   Eyes:      General: No scleral icterus.     Extraocular Movements: Extraocular movements intact.   Pulmonary:      Effort: Pulmonary effort is normal. No respiratory " distress.   Abdominal:      General: There is no distension.   Musculoskeletal:      Cervical back: Neck supple.   Lymphadenopathy:      Cervical: No cervical adenopathy.   Skin:     General: Skin is warm and dry.   Neurological:      General: No focal deficit present.      Mental Status: He is alert and oriented to person, place, and time.      Cranial Nerves: No cranial nerve deficit.   Psychiatric:         Mood and Affect: Mood normal.         Behavior: Behavior normal.         Judgment: Judgment normal.          Labs:    Imaging: I have personally reviewed the patient's available images and reports and summarized pertinent findings above in HPI.     Pathology: I have personally reviewed the patient's available pathology and summarized pertinent findings above in HPI.       - Thank you for allowing me to participate in the care of your patient.    Miguel Durand MD, PhD

## 2022-06-15 NOTE — TELEPHONE ENCOUNTER
----- Message from Ariela Alston sent at 6/15/2022  6:18 AM CDT -----  Regarding: call back  Contact: pt  Pt requesting to speak w/ you in regards to Appointment Today    Pt would like to know if he could come in a little later Today, He has transportation issues    Please call and advise    Phone [phone]

## 2022-06-15 NOTE — PATIENT CARE CONFERENCE
[facility]      THORACIC MULTIDISCIPLINARY TUMOR BOARD  PATIENT REVIEW FORM  ________________________________________________________________________    CLINIC #: [id number]  DATE: 06/15/2022    DIAGNOSIS:   NSCLC    PRESENTER:   Dr. Perez     PATIENT SUMMARY:   Patient is a 81 y.o. male smoker with CAD, HTN, HLD, AAA, COPD, BPH, and RUL adenocarcinoma. 1cm RUL nodule first identified on CT in July 2021. Interval scans showed progressive size increase. In April 2022 nodule measuring 2.2cm. PET with SUV of 11 without mediastinal or hilar avidity. Incidental finding of nonspecific prostate uptake. Robotic bronchoscopy with biopsy of RUL nodule, 11R, and 11L. Nodule positive for poorly differentiated carcinoma but adenocarcinoma by IHC. LN negative for malignant cells.    PFTS 6/13/22: FEV1 1.69  64% DLCO 7.8  34%  6MWT 6/13/22:   Six minute walk distance is 396.24 meters (1300 feet) with somewhat heavy dyspnea.    BOARD RECOMMENDATIONS:   PFTS preclude lobectomy. Agree with evaluation for SBRT.     CONSULT NEEDED:     [] Surgery    [] Hem/Onc    [x] Rad/Onc    [] Dietary                 [] Social Service    [] Psychology       [] Pulmonology    Clinical Stage: Tumor 1cNode(s) 0 Metastasis 0   Pathologic Stage: Tumor  Node(s)  Metastasis     GROUP STAGE:     [] O    [x] 1A    [] IB    [] IIA    [] IIB     [] IIIA     [] IIIB     [] IIIC    [] IV                               [] Local recurrence     [] Regional recurrence     [] Distant recurrence                   [x] NSCLC     [] SCLC     Tumor type     Unstageable:      [] Yes     [] No  Metastatic site(s): no         [x] [facility] Treatment Guidelines reviewed and care planned is consistent with guidelines.         (i.e., NCCN, NCI, PD, ACO, AUA, etc.)    PRESENTATION AT CANCER CONFERENCE:         [] Prospective    [] Retrospective     [] Follow-Up          [] Eligible for clinical trial

## 2022-06-15 NOTE — TELEPHONE ENCOUNTER
----- Message from Jelly Stauffer sent at 6/15/2022  9:06 AM CDT -----  Regarding: call back  Pt returned call he can be reached at [phone]

## 2022-06-20 ENCOUNTER — HOSPITAL ENCOUNTER (OUTPATIENT)
Dept: RADIATION THERAPY | Facility: HOSPITAL | Age: 82
Discharge: HOME | End: 2022-06-20
Attending: RADIOLOGY
Payer: MEDICARE

## 2022-06-20 PROCEDURE — 77263 THER RADIOLOGY TX PLNG CPLX: CPT | Mod: ,,, | Performed by: RADIOLOGY

## 2022-06-20 PROCEDURE — 77334 PR  RADN TREATMENT AID(S) COMPLX: ICD-10-PCS | Mod: 26,,, | Performed by: RADIOLOGY

## 2022-06-20 PROCEDURE — 77014 PR  CT GUIDANCE PLACEMENT RAD THERAPY FIELDS: ICD-10-PCS | Mod: 26,,, | Performed by: RADIOLOGY

## 2022-06-20 PROCEDURE — 77334 RADIATION TREATMENT AID(S): CPT | Mod: 26,,, | Performed by: RADIOLOGY

## 2022-06-20 PROCEDURE — 77014 HC CT GUIDANCE RADIATION THERAPY FLDS PLACEMENT: CPT | Mod: TC | Performed by: RADIOLOGY

## 2022-06-20 PROCEDURE — 77014 PR  CT GUIDANCE PLACEMENT RAD THERAPY FIELDS: CPT | Mod: 26,,, | Performed by: RADIOLOGY

## 2022-06-20 PROCEDURE — 77334 RADIATION TREATMENT AID(S): CPT | Mod: TC | Performed by: RADIOLOGY

## 2022-06-20 PROCEDURE — 77263 PR  RADIATION THERAPY PLAN COMPLEX: ICD-10-PCS | Mod: ,,, | Performed by: RADIOLOGY

## 2022-06-27 PROCEDURE — 77301 PR  INTEN MOD RADIOTHER PLAN W/DOSE VOL HIST: ICD-10-PCS | Mod: 26,,, | Performed by: RADIOLOGY

## 2022-06-27 PROCEDURE — 77301 RADIOTHERAPY DOSE PLAN IMRT: CPT | Mod: TC | Performed by: RADIOLOGY

## 2022-06-27 PROCEDURE — 77301 RADIOTHERAPY DOSE PLAN IMRT: CPT | Mod: 26,,, | Performed by: RADIOLOGY

## 2022-06-27 PROCEDURE — 77293 RESPIRATOR MOTION MGMT SIMUL: CPT | Mod: TC | Performed by: RADIOLOGY

## 2022-06-27 PROCEDURE — 77293 RESPIRATOR MOTION MGMT SIMUL: CPT | Mod: 26,,, | Performed by: RADIOLOGY

## 2022-06-27 PROCEDURE — 77293 PR RESPIRATORY MOTION MGMT SIMULATION: ICD-10-PCS | Mod: 26,,, | Performed by: RADIOLOGY

## 2022-06-28 PROCEDURE — 77300 RADIATION THERAPY DOSE PLAN: CPT | Mod: 26,,, | Performed by: RADIOLOGY

## 2022-06-28 PROCEDURE — 77338 DESIGN MLC DEVICE FOR IMRT: CPT | Mod: TC | Performed by: RADIOLOGY

## 2022-06-28 PROCEDURE — 77300 PR RADIATION THERAPY,DOSIMETRY PLAN: ICD-10-PCS | Mod: 26,,, | Performed by: RADIOLOGY

## 2022-06-28 PROCEDURE — 77014 HC CT GUIDANCE RADIATION THERAPY FLDS PLACEMENT: CPT | Mod: TC,59 | Performed by: RADIOLOGY

## 2022-06-28 PROCEDURE — 77014 PR  CT GUIDANCE PLACEMENT RAD THERAPY FIELDS: CPT | Mod: 26,,, | Performed by: RADIOLOGY

## 2022-06-28 PROCEDURE — 77338 DESIGN MLC DEVICE FOR IMRT: CPT | Mod: 26,,, | Performed by: RADIOLOGY

## 2022-06-28 PROCEDURE — 77373 STRTCTC BDY RAD THER TX DLVR: CPT | Performed by: RADIOLOGY

## 2022-06-28 PROCEDURE — 77338 PR  MLC IMRT DESIGN & CONSTRUCTION PER IMRT PLAN: ICD-10-PCS | Mod: 26,,, | Performed by: RADIOLOGY

## 2022-06-28 PROCEDURE — 77300 RADIATION THERAPY DOSE PLAN: CPT | Mod: TC | Performed by: RADIOLOGY

## 2022-06-28 PROCEDURE — 77014 PR  CT GUIDANCE PLACEMENT RAD THERAPY FIELDS: ICD-10-PCS | Mod: 26,,, | Performed by: RADIOLOGY

## 2022-06-28 PROCEDURE — 77370 RADIATION PHYSICS CONSULT: CPT | Performed by: RADIOLOGY

## 2022-06-29 PROCEDURE — 77014 HC CT GUIDANCE RADIATION THERAPY FLDS PLACEMENT: CPT | Mod: TC,59 | Performed by: RADIOLOGY

## 2022-06-29 PROCEDURE — 77014 PR  CT GUIDANCE PLACEMENT RAD THERAPY FIELDS: CPT | Mod: 26,,, | Performed by: RADIOLOGY

## 2022-06-29 PROCEDURE — 77470 PR  SPECIAL RADIATION TREATMENT: ICD-10-PCS | Mod: 26,59,, | Performed by: RADIOLOGY

## 2022-06-29 PROCEDURE — 77470 SPECIAL RADIATION TREATMENT: CPT | Mod: 59,TC | Performed by: RADIOLOGY

## 2022-06-29 PROCEDURE — 77014 PR  CT GUIDANCE PLACEMENT RAD THERAPY FIELDS: ICD-10-PCS | Mod: 26,,, | Performed by: RADIOLOGY

## 2022-06-29 PROCEDURE — 77373 STRTCTC BDY RAD THER TX DLVR: CPT | Performed by: RADIOLOGY

## 2022-06-29 PROCEDURE — 77470 SPECIAL RADIATION TREATMENT: CPT | Mod: 26,59,, | Performed by: RADIOLOGY

## 2022-06-30 DIAGNOSIS — C34.11 PRIMARY MALIGNANT NEOPLASM OF RIGHT UPPER LOBE OF LUNG: Primary | ICD-10-CM

## 2022-06-30 PROCEDURE — 77014 HC CT GUIDANCE RADIATION THERAPY FLDS PLACEMENT: CPT | Mod: TC | Performed by: RADIOLOGY

## 2022-06-30 PROCEDURE — 77014 PR  CT GUIDANCE PLACEMENT RAD THERAPY FIELDS: CPT | Mod: 26,,, | Performed by: RADIOLOGY

## 2022-06-30 PROCEDURE — 77014 PR  CT GUIDANCE PLACEMENT RAD THERAPY FIELDS: ICD-10-PCS | Mod: 26,,, | Performed by: RADIOLOGY

## 2022-06-30 PROCEDURE — 77373 STRTCTC BDY RAD THER TX DLVR: CPT | Performed by: RADIOLOGY

## 2022-07-01 ENCOUNTER — HOSPITAL ENCOUNTER (OUTPATIENT)
Dept: RADIATION THERAPY | Facility: HOSPITAL | Age: 82
Discharge: HOME | End: 2022-07-01
Attending: RADIOLOGY
Payer: MEDICARE

## 2022-07-01 PROCEDURE — 77014 PR  CT GUIDANCE PLACEMENT RAD THERAPY FIELDS: ICD-10-PCS | Mod: 26,,, | Performed by: RADIOLOGY

## 2022-07-01 PROCEDURE — 77373 STRTCTC BDY RAD THER TX DLVR: CPT | Performed by: RADIOLOGY

## 2022-07-01 PROCEDURE — 77014 PR  CT GUIDANCE PLACEMENT RAD THERAPY FIELDS: CPT | Mod: 26,,, | Performed by: RADIOLOGY

## 2022-07-01 PROCEDURE — 77014 HC CT GUIDANCE RADIATION THERAPY FLDS PLACEMENT: CPT | Mod: TC | Performed by: RADIOLOGY

## 2022-07-05 PROCEDURE — 77014 HC CT GUIDANCE RADIATION THERAPY FLDS PLACEMENT: CPT | Mod: TC | Performed by: RADIOLOGY

## 2022-07-05 PROCEDURE — 77373 STRTCTC BDY RAD THER TX DLVR: CPT | Performed by: RADIOLOGY

## 2022-07-05 PROCEDURE — 77014 PR  CT GUIDANCE PLACEMENT RAD THERAPY FIELDS: ICD-10-PCS | Mod: 26,,, | Performed by: RADIOLOGY

## 2022-07-05 PROCEDURE — 77014 PR  CT GUIDANCE PLACEMENT RAD THERAPY FIELDS: CPT | Mod: 26,,, | Performed by: RADIOLOGY

## 2022-07-06 PROCEDURE — 77336 RADIATION PHYSICS CONSULT: CPT | Performed by: RADIOLOGY

## 2022-07-27 ENCOUNTER — TELEPHONE (OUTPATIENT)
Dept: RADIATION ONCOLOGY | Facility: CLINIC | Age: 82
End: 2022-07-27
Payer: MEDICARE

## 2022-07-27 NOTE — TELEPHONE ENCOUNTER
Return call to patient states he has developed a dry cough after radiation  Denies sob or fever has not been exposed to covid  Instructed to take OTC cough medicine  Message sent to Dr Druand

## 2022-07-27 NOTE — TELEPHONE ENCOUNTER
----- Message from Cecil Haley sent at 7/27/2022  2:03 PM CDT -----       Type: Patient Returning Call    Who Called: Patient   Who Left Message for Patient: NA  Does the patient know what this is regarding?: Patient says that he has a cough and running nose after having radiation, wants to know if medications can be called into his local pharmacy for treatment.   Would the patient rather a call back or a response via [facility]? Call  Best Call Back Number: [phone]  Additional Information: Please assist, thank you!

## 2022-08-08 LAB
ADEQUACY: ABNORMAL
COMMENT: ABNORMAL
FINAL PATHOLOGIC DIAGNOSIS: ABNORMAL
Lab: ABNORMAL
MICROSCOPIC EXAM: ABNORMAL
SUPPLEMENTAL DIAGNOSIS: ABNORMAL

## 2022-08-29 ENCOUNTER — TELEPHONE (OUTPATIENT)
Dept: CARDIOLOGY | Facility: CLINIC | Age: 82
End: 2022-08-29
Payer: MEDICARE

## 2022-08-29 NOTE — TELEPHONE ENCOUNTER
"Gave patient appt for 9/6/2022 at 9:00 with Tana Luciano NP.      ----- Message from Wilton Bowers sent at 8/29/2022  9:00 AM CDT -----  Scheduling Request        Patient Status: Est      Scheduling Appt: F/u      Time/Date Preference: 9/6 @ 10:30 am      Contact Preference?: [phone]       Treating Provider: Ravinder      Do you feel you need to be seen today? No      Additional Notes:  Epic wasn't allowing me to schedule appt      "Thank you for all that you do for our patients"     "

## 2022-09-06 ENCOUNTER — OFFICE VISIT (OUTPATIENT)
Dept: CARDIOLOGY | Facility: CLINIC | Age: 82
End: 2022-09-06
Payer: MEDICARE

## 2022-09-06 VITALS
BODY MASS INDEX: 18.79 KG/M2 | WEIGHT: 134.25 LBS | HEIGHT: 71 IN | SYSTOLIC BLOOD PRESSURE: 143 MMHG | HEART RATE: 58 BPM | OXYGEN SATURATION: 97 % | DIASTOLIC BLOOD PRESSURE: 64 MMHG

## 2022-09-06 DIAGNOSIS — Z72.0 TOBACCO USER: ICD-10-CM

## 2022-09-06 DIAGNOSIS — I45.10 RBBB: ICD-10-CM

## 2022-09-06 DIAGNOSIS — Z98.61 S/P PTCA (PERCUTANEOUS TRANSLUMINAL CORONARY ANGIOPLASTY): ICD-10-CM

## 2022-09-06 DIAGNOSIS — I10 PRIMARY HYPERTENSION: Chronic | ICD-10-CM

## 2022-09-06 DIAGNOSIS — I25.2 HX OF MYOCARDIAL INFARCTION: Primary | ICD-10-CM

## 2022-09-06 PROCEDURE — 1160F PR REVIEW ALL MEDS BY PRESCRIBER/CLIN PHARMACIST DOCUMENTED: ICD-10-PCS | Mod: CPTII,S$GLB,, | Performed by: NURSE PRACTITIONER

## 2022-09-06 PROCEDURE — 99999 PR PBB SHADOW E&M-EST. PATIENT-LVL IV: ICD-10-PCS | Mod: PBBFAC,,, | Performed by: NURSE PRACTITIONER

## 2022-09-06 PROCEDURE — 1159F PR MEDICATION LIST DOCUMENTED IN MEDICAL RECORD: ICD-10-PCS | Mod: CPTII,S$GLB,, | Performed by: NURSE PRACTITIONER

## 2022-09-06 PROCEDURE — 1101F PT FALLS ASSESS-DOCD LE1/YR: CPT | Mod: CPTII,S$GLB,, | Performed by: NURSE PRACTITIONER

## 2022-09-06 PROCEDURE — 1101F PR PT FALLS ASSESS DOC 0-1 FALLS W/OUT INJ PAST YR: ICD-10-PCS | Mod: CPTII,S$GLB,, | Performed by: NURSE PRACTITIONER

## 2022-09-06 PROCEDURE — 3288F FALL RISK ASSESSMENT DOCD: CPT | Mod: CPTII,S$GLB,, | Performed by: NURSE PRACTITIONER

## 2022-09-06 PROCEDURE — 1126F AMNT PAIN NOTED NONE PRSNT: CPT | Mod: CPTII,S$GLB,, | Performed by: NURSE PRACTITIONER

## 2022-09-06 PROCEDURE — 3077F PR MOST RECENT SYSTOLIC BLOOD PRESSURE >= 140 MM HG: ICD-10-PCS | Mod: CPTII,S$GLB,, | Performed by: NURSE PRACTITIONER

## 2022-09-06 PROCEDURE — 99999 PR PBB SHADOW E&M-EST. PATIENT-LVL IV: CPT | Mod: PBBFAC,,, | Performed by: NURSE PRACTITIONER

## 2022-09-06 PROCEDURE — 3078F DIAST BP <80 MM HG: CPT | Mod: CPTII,S$GLB,, | Performed by: NURSE PRACTITIONER

## 2022-09-06 PROCEDURE — 1160F RVW MEDS BY RX/DR IN RCRD: CPT | Mod: CPTII,S$GLB,, | Performed by: NURSE PRACTITIONER

## 2022-09-06 PROCEDURE — 1159F MED LIST DOCD IN RCRD: CPT | Mod: CPTII,S$GLB,, | Performed by: NURSE PRACTITIONER

## 2022-09-06 PROCEDURE — 3077F SYST BP >= 140 MM HG: CPT | Mod: CPTII,S$GLB,, | Performed by: NURSE PRACTITIONER

## 2022-09-06 PROCEDURE — 93000 EKG 12-LEAD: ICD-10-PCS | Mod: S$GLB,,, | Performed by: INTERNAL MEDICINE

## 2022-09-06 PROCEDURE — 1126F PR PAIN SEVERITY QUANTIFIED, NO PAIN PRESENT: ICD-10-PCS | Mod: CPTII,S$GLB,, | Performed by: NURSE PRACTITIONER

## 2022-09-06 PROCEDURE — 99212 OFFICE O/P EST SF 10 MIN: CPT | Mod: S$GLB,,, | Performed by: NURSE PRACTITIONER

## 2022-09-06 PROCEDURE — 3078F PR MOST RECENT DIASTOLIC BLOOD PRESSURE < 80 MM HG: ICD-10-PCS | Mod: CPTII,S$GLB,, | Performed by: NURSE PRACTITIONER

## 2022-09-06 PROCEDURE — 99212 PR OFFICE/OUTPT VISIT, EST, LEVL II, 10-19 MIN: ICD-10-PCS | Mod: S$GLB,,, | Performed by: NURSE PRACTITIONER

## 2022-09-06 PROCEDURE — 3288F PR FALLS RISK ASSESSMENT DOCUMENTED: ICD-10-PCS | Mod: CPTII,S$GLB,, | Performed by: NURSE PRACTITIONER

## 2022-09-06 PROCEDURE — 93000 ELECTROCARDIOGRAM COMPLETE: CPT | Mod: S$GLB,,, | Performed by: INTERNAL MEDICINE

## 2022-09-06 PROCEDURE — 1157F ADVNC CARE PLAN IN RCRD: CPT | Mod: CPTII,S$GLB,, | Performed by: NURSE PRACTITIONER

## 2022-09-06 PROCEDURE — 1157F PR ADVANCE CARE PLAN OR EQUIV PRESENT IN MEDICAL RECORD: ICD-10-PCS | Mod: CPTII,S$GLB,, | Performed by: NURSE PRACTITIONER

## 2022-09-06 RX ORDER — IPRATROPIUM BROMIDE 42 UG/1
SPRAY, METERED NASAL
COMMUNITY
Start: 2022-07-29 | End: 2022-12-06

## 2022-09-06 NOTE — PATIENT INSTRUCTIONS
Continue to check your blood pressure as you have    Continue to cut back on smoking- this is incredibly important    Continue taking your medications as you have

## 2022-09-06 NOTE — PROGRESS NOTES
Cardiology    9/6/2022  8:45 AM    Problem list  Patient Active Problem List   Diagnosis    HTN (hypertension)    Hypercholesteremia    CAD (coronary artery disease)    Pulmonary nodule    Urine retention    Hx of myocardial infarction    Enlarged prostate    History of bladder cancer    Abdominal aortic aneurysm (AAA) without rupture    PVD (peripheral vascular disease)    Carotid stenosis, asymptomatic, bilateral    Hemoptysis    Centrilobular emphysema    Tobacco user    S/P PTCA (percutaneous transluminal coronary angioplasty)    Aortic arch atherosclerosis    History of endovascular stent graft for abdominal aortic aneurysm (AAA)    RBBB    Dyspnea on exertion    Localized edema    Adenocarcinoma of right upper lobe of lung       CC:  Shortness of breath    HPI:  Followed by Dr. Castellon and is new to me.  Has a little of nervousness today and can feel it in his chest.  No dizziness or lightheadedness.  Has light SoB.  Has trouble walking.  When he walks from [employer] to the [facility] he has some hip pain. No chest pain, no changes in SoB, does have COPD which can flare sometimes, had lung CA, had rad only.  Goes back in October to follow up. Home BP log reviewed.  Does have to take breaks with walking.  Still smoking- has cut back, smokes about 12 cigarettes a day. Gets nervous. Will be seeing Dr. Snyder in the coming weeks for meds.     Medications  Current Outpatient Medications   Medication Sig Dispense Refill    ascorbic acid, vitamin C, (VITAMIN C) 500 MG tablet Take 500 mg by mouth once daily.      aspirin (ECOTRIN) 81 MG EC tablet Take 81 mg by mouth once daily.      atenoloL (TENORMIN) 25 MG tablet Take 1 tablet (25 mg total) by mouth once daily. 90 tablet 0    atorvastatin (LIPITOR) 40 MG tablet Take 1 tablet (40 mg total) by mouth once daily. 90 tablet 0    clopidogreL (PLAVIX) 75 mg tablet Take 1 tablet (75 mg total) by mouth once daily. 90 tablet 0    ergocalciferol (VITAMIN D2) 50,000 unit Cap  TAKE 1 CAPSULE BY MOUTH  EVERY 30 days 12 capsule 0    finasteride (PROSCAR) 5 mg tablet Take 1 tablet (5 mg total) by mouth once daily. 90 tablet 0    tamsulosin (FLOMAX) 0.4 mg Cap Take 1 capsule (0.4 mg total) by mouth once daily. 90 capsule 0    vit A/vit C/vit E/zinc/copper (ICAPS AREDS ORAL) Take by mouth 2 (two) times a day.      ipratropium (ATROVENT) 42 mcg (0.06 %) nasal spray SMARTSI-2 Spray(s) Both Nares Every 6 Hours PRN       No current facility-administered medications for this visit.      Prior to Admission medications    Medication Sig Start Date End Date Taking? Authorizing Provider   ascorbic acid, vitamin C, (VITAMIN C) 500 MG tablet Take 500 mg by mouth once daily.   Yes Historical Provider   aspirin (ECOTRIN) 81 MG EC tablet Take 81 mg by mouth once daily.   Yes Historical Provider   atenoloL (TENORMIN) 25 MG tablet Take 1 tablet (25 mg total) by mouth once daily. 22  Yes Emelia Snyder MD   atorvastatin (LIPITOR) 40 MG tablet Take 1 tablet (40 mg total) by mouth once daily. 22  Yes Emelia Snyder MD   clopidogreL (PLAVIX) 75 mg tablet Take 1 tablet (75 mg total) by mouth once daily. 22  Yes Emelia Snyder MD   ergocalciferol (VITAMIN D2) 50,000 unit Cap TAKE 1 CAPSULE BY MOUTH  EVERY 30 days 22  Yes Emelia Snyder MD   finasteride (PROSCAR) 5 mg tablet Take 1 tablet (5 mg total) by mouth once daily. 22  Yes Emelia Snyder MD   tamsulosin (FLOMAX) 0.4 mg Cap Take 1 capsule (0.4 mg total) by mouth once daily. 22  Yes Emelia Snyder MD   vit A/vit C/vit E/zinc/copper (ICAPS AREDS ORAL) Take by mouth 2 (two) times a day.   Yes Historical Provider   ipratropium (ATROVENT) 42 mcg (0.06 %) nasal spray SMARTSI-2 Spray(s) Both Nares Every 6 Hours PRN 22   Historical Provider         History  Past Medical History:   Diagnosis Date    AAA (abdominal aortic aneurysm) without rupture     pt with h/o aaa     Acute coronary syndrome     Cancer     bladder  "   Carotid artery occlusion     COPD (chronic obstructive pulmonary disease)     Coronary artery disease     Enlarged prostate     History of bladder cancer     Hyperlipidemia     Hypertension     Lung abnormality     spot on lung under care of pulmonologist at [facility] Dr lua    Myocardial infarct 1988    Urine retention      Past Surgical History:   Procedure Laterality Date    ABDOMINAL AORTIC ANEURYSM REPAIR  2007    BLADDER SURGERY      CARDIAC CATHETERIZATION      CORONARY ANGIOPLASTY      CORONARY STENT PLACEMENT      FRACTURE SURGERY      arm    PROSTATE SURGERY      ROBOTIC BRONCHOSCOPY N/A 5/31/2022    Procedure: ROBOTIC BRONCHOSCOPY;  Surgeon: Mickey Acosta MD;  Location: [facility] OR [address] [place] [address];  Service: Pulmonary;  Laterality: N/A;    TONSILLECTOMY       Social History     Socioeconomic History    Marital status:    Occupational History    Occupation: retired    Tobacco Use    Smoking status: Every Day     Packs/day: 0.50     Years: 60.00     Pack years: 30.00     Types: Cigarettes    Smokeless tobacco: Never   Substance and Sexual Activity    Alcohol use: Yes     Alcohol/week: 1.0 standard drink     Types: 1 Shots of liquor per week     Comment:  five drink per week     Drug use: No    Sexual activity: Yes     Partners: Female     Birth control/protection: None         Allergies  Review of patient's allergies indicates:  No Known Allergies      Review of Systems   Review of Systems   Constitutional: Negative for diaphoresis and malaise/fatigue.   HENT: Negative.     Cardiovascular:  Positive for palpitations ("feels like it is going crazy"). Negative for chest pain, claudication, dyspnea on exertion, irregular heartbeat, leg swelling, near-syncope, orthopnea, paroxysmal nocturnal dyspnea and syncope.   Respiratory:  Negative for shortness of breath.    Endocrine: Negative for polydipsia, polyphagia and polyuria.   Hematologic/Lymphatic: Does not bruise/bleed easily.   Musculoskeletal:  " Positive for joint pain (hips).   Gastrointestinal:  Negative for bloating, nausea and vomiting.   Genitourinary: Negative.    Neurological:  Negative for excessive daytime sleepiness, dizziness, light-headedness, loss of balance and weakness.   Psychiatric/Behavioral:  The patient is nervous/anxious.    Allergic/Immunologic: Negative.        Physical Exam  Wt Readings from Last 1 Encounters:   09/06/22 60.9 kg (134 lb 4.2 oz)     BP Readings from Last 3 Encounters:   09/06/22 (!) 143/64   07/07/22 127/73   06/15/22 (!) 148/64     Pulse Readings from Last 1 Encounters:   09/06/22 (!) 58     Body mass index is 18.73 kg/m².    Physical Exam  Vitals and nursing note reviewed.   Constitutional:       Appearance: Normal appearance.   HENT:      Head: Normocephalic and atraumatic.      Mouth/Throat:      Mouth: Mucous membranes are moist.   Eyes:      Pupils: Pupils are equal, round, and reactive to light.   Cardiovascular:      Rate and Rhythm: Normal rate and regular rhythm.      Pulses:           Radial pulses are 2+ on the right side and 2+ on the left side.        Dorsalis pedis pulses are 2+ on the right side and 2+ on the left side.        Posterior tibial pulses are 2+ on the right side and 2+ on the left side.      Heart sounds: No murmur heard.  Pulmonary:      Effort: Pulmonary effort is normal. No respiratory distress.      Comments: Diminished posteriorly, coarse  Abdominal:      General: There is no distension.      Tenderness: There is no abdominal tenderness.   Musculoskeletal:      Cervical back: Normal range of motion.      Right lower leg: Edema (trace) present.      Left lower leg: Edema (trace) present.   Skin:     General: Skin is warm and dry.      Findings: No erythema.   Neurological:      General: No focal deficit present.      Mental Status: He is alert.   Psychiatric:         Mood and Affect: Mood normal.         Behavior: Behavior normal.       Problem List Items Addressed This Visit           Cardiac/Vascular    HTN (hypertension) (Chronic)    Overview     Home log reviewed  Good overall slightly elevated in clinic today but overall well controlled  Continue meds as now         Current Assessment & Plan     As above         Hx of myocardial infarction - Primary    Relevant Orders    IN OFFICE EKG 12-LEAD (to Muse)    S/P PTCA (percutaneous transluminal coronary angioplasty)    Overview     In 1996  On ASA, BB, plavix and statin  Repeat EKG stable         Current Assessment & Plan     As above         RBBB    Overview     Noted on past EKG  Stable            Other    Tobacco user    Overview     Discussed imperative need to quit.  Patient is aware and is trying to cut back on his own        '        EKG reviewed by me and stable to my eye    Follow Up  3 months      @Tana Luciano DNP

## 2022-09-07 ENCOUNTER — TELEPHONE (OUTPATIENT)
Dept: CARDIOLOGY | Facility: CLINIC | Age: 82
End: 2022-09-07
Payer: MEDICARE

## 2022-09-07 NOTE — TELEPHONE ENCOUNTER
Returned call to patient.  No answer.  Left callback message.        ----- Message from Kennedi Nunez MA sent at 9/6/2022  4:14 PM CDT -----  Regarding: FW: Call Back  Contact: Martell Whitaker    ----- Message -----  From: Maria Ines Kee  Sent: 9/6/2022   1:54 PM CDT  To: Ravinder [facility] Staff  Subject: Call Back                                        Patient is needing a call back about his meds. Martell Grissom call back number is [phone].

## 2022-10-07 ENCOUNTER — OFFICE VISIT (OUTPATIENT)
Dept: RADIATION ONCOLOGY | Facility: CLINIC | Age: 82
End: 2022-10-07
Payer: MEDICARE

## 2022-10-07 ENCOUNTER — HOSPITAL ENCOUNTER (OUTPATIENT)
Dept: RADIOLOGY | Facility: HOSPITAL | Age: 82
Discharge: HOME | End: 2022-10-07
Attending: RADIOLOGY
Payer: MEDICARE

## 2022-10-07 VITALS
WEIGHT: 138.44 LBS | DIASTOLIC BLOOD PRESSURE: 62 MMHG | TEMPERATURE: 97 F | HEIGHT: 70 IN | BODY MASS INDEX: 19.82 KG/M2 | SYSTOLIC BLOOD PRESSURE: 133 MMHG | RESPIRATION RATE: 16 BRPM | HEART RATE: 53 BPM | OXYGEN SATURATION: 96 %

## 2022-10-07 DIAGNOSIS — Z85.118 PERSONAL HISTORY OF LUNG CANCER: Primary | ICD-10-CM

## 2022-10-07 DIAGNOSIS — C34.11 PRIMARY MALIGNANT NEOPLASM OF RIGHT UPPER LOBE OF LUNG: ICD-10-CM

## 2022-10-07 PROCEDURE — 1157F ADVNC CARE PLAN IN RCRD: CPT | Mod: CPTII,S$GLB,, | Performed by: RADIOLOGY

## 2022-10-07 PROCEDURE — 3288F FALL RISK ASSESSMENT DOCD: CPT | Mod: CPTII,S$GLB,, | Performed by: RADIOLOGY

## 2022-10-07 PROCEDURE — 1126F AMNT PAIN NOTED NONE PRSNT: CPT | Mod: CPTII,S$GLB,, | Performed by: RADIOLOGY

## 2022-10-07 PROCEDURE — 99999 PR PBB SHADOW E&M-EST. PATIENT-LVL IV: CPT | Mod: PBBFAC,,, | Performed by: RADIOLOGY

## 2022-10-07 PROCEDURE — 1101F PR PT FALLS ASSESS DOC 0-1 FALLS W/OUT INJ PAST YR: ICD-10-PCS | Mod: CPTII,S$GLB,, | Performed by: RADIOLOGY

## 2022-10-07 PROCEDURE — 1157F PR ADVANCE CARE PLAN OR EQUIV PRESENT IN MEDICAL RECORD: ICD-10-PCS | Mod: CPTII,S$GLB,, | Performed by: RADIOLOGY

## 2022-10-07 PROCEDURE — 71250 CT CHEST WITHOUT CONTRAST: ICD-10-PCS | Mod: 26,,, | Performed by: RADIOLOGY

## 2022-10-07 PROCEDURE — 1159F MED LIST DOCD IN RCRD: CPT | Mod: CPTII,S$GLB,, | Performed by: RADIOLOGY

## 2022-10-07 PROCEDURE — 71250 CT THORAX DX C-: CPT | Mod: TC

## 2022-10-07 PROCEDURE — 3075F PR MOST RECENT SYSTOLIC BLOOD PRESS GE 130-139MM HG: ICD-10-PCS | Mod: CPTII,S$GLB,, | Performed by: RADIOLOGY

## 2022-10-07 PROCEDURE — 71250 CT THORAX DX C-: CPT | Mod: 26,,, | Performed by: RADIOLOGY

## 2022-10-07 PROCEDURE — 1126F PR PAIN SEVERITY QUANTIFIED, NO PAIN PRESENT: ICD-10-PCS | Mod: CPTII,S$GLB,, | Performed by: RADIOLOGY

## 2022-10-07 PROCEDURE — 3075F SYST BP GE 130 - 139MM HG: CPT | Mod: CPTII,S$GLB,, | Performed by: RADIOLOGY

## 2022-10-07 PROCEDURE — 1101F PT FALLS ASSESS-DOCD LE1/YR: CPT | Mod: CPTII,S$GLB,, | Performed by: RADIOLOGY

## 2022-10-07 PROCEDURE — 99024 PR POST-OP FOLLOW-UP VISIT: ICD-10-PCS | Mod: S$GLB,,, | Performed by: RADIOLOGY

## 2022-10-07 PROCEDURE — 1159F PR MEDICATION LIST DOCUMENTED IN MEDICAL RECORD: ICD-10-PCS | Mod: CPTII,S$GLB,, | Performed by: RADIOLOGY

## 2022-10-07 PROCEDURE — 99024 POSTOP FOLLOW-UP VISIT: CPT | Mod: S$GLB,,, | Performed by: RADIOLOGY

## 2022-10-07 PROCEDURE — 3078F PR MOST RECENT DIASTOLIC BLOOD PRESSURE < 80 MM HG: ICD-10-PCS | Mod: CPTII,S$GLB,, | Performed by: RADIOLOGY

## 2022-10-07 PROCEDURE — 3078F DIAST BP <80 MM HG: CPT | Mod: CPTII,S$GLB,, | Performed by: RADIOLOGY

## 2022-10-07 PROCEDURE — 3288F PR FALLS RISK ASSESSMENT DOCUMENTED: ICD-10-PCS | Mod: CPTII,S$GLB,, | Performed by: RADIOLOGY

## 2022-10-07 PROCEDURE — 99999 PR PBB SHADOW E&M-EST. PATIENT-LVL IV: ICD-10-PCS | Mod: PBBFAC,,, | Performed by: RADIOLOGY

## 2022-10-07 NOTE — PROGRESS NOTES
10/07/2022    Radiation Oncology Follow-Up Visit      Prior Radiation History:    Site  Technique  Energy  Dose/Fx (Gy)  #Fx  Total Dose (Gy)  Start Date  End Date  Elapsed Days    RUL Lung  SBRT  6X  10  5 / 5  50  6/28/2022 7/5/2022  7        Is the patient female between ages 15-55:  no    Does the patient have a CIED:  no      Assessment   This is an 81 y.o. 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 PET/CT 5/9/22 demonstrated uptake in RUL primary only. He completed definitive SBRT 50 Gy in 5 fx on 7/5/22.    He is feeling well today; denies any worsening dyspnea or chest wall/back pain since end of radiation. CT Chest today 10/7/22 demonstrates interval decrease in size of treated RUL lesion and no evidence of new disease by my review.           Plan   1)I will see him back in 6 months with CT Chest prior for re-staging.          CHIEF COMPLAINT: F/U after lung SBRT    HPI: HPI      Past Medical History:   Diagnosis Date    AAA (abdominal aortic aneurysm) without rupture     pt with h/o aaa     Acute coronary syndrome     Cancer     bladder    Carotid artery occlusion     COPD (chronic obstructive pulmonary disease)     Coronary artery disease     Enlarged prostate     History of bladder cancer     Hyperlipidemia     Hypertension     Lung abnormality     spot on lung under care of pulmonologist at [facility] Dr lua    Myocardial infarct 1988    Urine retention        Past Surgical History:   Procedure Laterality Date    ABDOMINAL AORTIC ANEURYSM REPAIR  2007    BLADDER SURGERY      CARDIAC CATHETERIZATION      CORONARY ANGIOPLASTY      CORONARY STENT PLACEMENT      FRACTURE SURGERY      arm    PROSTATE SURGERY      ROBOTIC BRONCHOSCOPY N/A 5/31/2022    Procedure: ROBOTIC BRONCHOSCOPY;  Surgeon: Mickey Acosta MD;  Location: [facility] OR [address] [place] [address];  Service: Pulmonary;  Laterality: N/A;    TONSILLECTOMY         Social History     Tobacco Use    Smoking  status: Every Day     Packs/day: 0.50     Years: 60.00     Pack years: 30.00     Types: Cigarettes    Smokeless tobacco: Never   Substance Use Topics    Alcohol use: Yes     Alcohol/week: 1.0 standard drink     Types: 1 Shots of liquor per week     Comment:  five drink per week     Drug use: No       Cancer-related family history includes Cancer in his father.    Current Outpatient Medications on File Prior to Visit   Medication Sig Dispense Refill    ascorbic acid, vitamin C, (VITAMIN C) 500 MG tablet Take 500 mg by mouth once daily.      aspirin (ECOTRIN) 81 MG EC tablet Take 81 mg by mouth once daily.      atenoloL (TENORMIN) 25 MG tablet Take 1 tablet (25 mg total) by mouth once daily. 90 tablet 0    atorvastatin (LIPITOR) 40 MG tablet Take 1 tablet (40 mg total) by mouth once daily. 90 tablet 0    clopidogreL (PLAVIX) 75 mg tablet Take 1 tablet (75 mg total) by mouth once daily. 90 tablet 0    ergocalciferol (VITAMIN D2) 50,000 unit Cap TAKE 1 CAPSULE BY MOUTH  EVERY 30 days 12 capsule 0    finasteride (PROSCAR) 5 mg tablet Take 1 tablet (5 mg total) by mouth once daily. 90 tablet 0    ipratropium (ATROVENT) 42 mcg (0.06 %) nasal spray SMARTSI-2 Spray(s) Both Nares Every 6 Hours PRN      tamsulosin (FLOMAX) 0.4 mg Cap Take 1 capsule (0.4 mg total) by mouth once daily. 90 capsule 0    vit A/vit C/vit E/zinc/copper (ICAPS AREDS ORAL) Take by mouth 2 (two) times a day.       No current facility-administered medications on file prior to visit.       Review of patient's allergies indicates:  No Known Allergies    Review of Systems   Constitutional:  Negative for fever and weight loss.   HENT:  Negative for ear pain and sore throat.    Eyes:  Negative for blurred vision and double vision.   Respiratory:  Positive for cough and shortness of breath. Negative for hemoptysis.    Cardiovascular:  Negative for chest pain and leg swelling.   Gastrointestinal:  Negative for abdominal pain, constipation, diarrhea,  "heartburn and nausea.   Genitourinary:  Negative for dysuria and hematuria.   Musculoskeletal:  Negative for falls and joint pain.   Neurological:  Negative for tingling, speech change, focal weakness, seizures and headaches.   Psychiatric/Behavioral:  Negative for depression. The patient is not nervous/anxious.       Vital Signs: /62 (BP Location: Left arm, Patient Position: Sitting)   Pulse (!) 53   Temp 97.4 °F (36.3 °C)   Resp 16   Ht 5' 10" (1.778 m)   Wt 62.8 kg (138 lb 7.2 oz)   SpO2 96%   BMI 19.87 kg/m²     ECOG Performance Status: 2 - Ambulates, capable of self care only    Physical Exam  Vitals reviewed.   Constitutional:       Appearance: Normal appearance.   HENT:      Head: Normocephalic and atraumatic.   Eyes:      General: No scleral icterus.     Extraocular Movements: Extraocular movements intact.   Pulmonary:      Effort: Pulmonary effort is normal. No respiratory distress.   Abdominal:      General: There is no distension.   Musculoskeletal:      Cervical back: Neck supple.   Lymphadenopathy:      Cervical: No cervical adenopathy.   Skin:     General: Skin is warm and dry.   Neurological:      General: No focal deficit present.      Mental Status: He is alert and oriented to person, place, and time.      Cranial Nerves: No cranial nerve deficit.   Psychiatric:         Mood and Affect: Mood normal.         Behavior: Behavior normal.         Judgment: Judgment normal.        Labs:    Imaging: I have personally reviewed the patient's available images and reports and summarized pertinent findings above in HPI.     Pathology: No new path        "

## 2022-12-06 ENCOUNTER — OFFICE VISIT (OUTPATIENT)
Dept: CARDIOLOGY | Facility: CLINIC | Age: 82
End: 2022-12-06
Payer: MEDICARE

## 2022-12-06 VITALS
HEART RATE: 58 BPM | DIASTOLIC BLOOD PRESSURE: 68 MMHG | HEIGHT: 70 IN | WEIGHT: 139.56 LBS | BODY MASS INDEX: 19.98 KG/M2 | SYSTOLIC BLOOD PRESSURE: 138 MMHG | OXYGEN SATURATION: 97 %

## 2022-12-06 DIAGNOSIS — I45.10 RBBB: ICD-10-CM

## 2022-12-06 DIAGNOSIS — I10 HYPERTENSION, UNSPECIFIED TYPE: Chronic | ICD-10-CM

## 2022-12-06 DIAGNOSIS — I73.9 PVD (PERIPHERAL VASCULAR DISEASE): ICD-10-CM

## 2022-12-06 DIAGNOSIS — I25.2 HX OF MYOCARDIAL INFARCTION: Primary | ICD-10-CM

## 2022-12-06 PROCEDURE — 1159F PR MEDICATION LIST DOCUMENTED IN MEDICAL RECORD: ICD-10-PCS | Mod: CPTII,S$GLB,, | Performed by: NURSE PRACTITIONER

## 2022-12-06 PROCEDURE — 1160F RVW MEDS BY RX/DR IN RCRD: CPT | Mod: CPTII,S$GLB,, | Performed by: NURSE PRACTITIONER

## 2022-12-06 PROCEDURE — 3288F PR FALLS RISK ASSESSMENT DOCUMENTED: ICD-10-PCS | Mod: CPTII,S$GLB,, | Performed by: NURSE PRACTITIONER

## 2022-12-06 PROCEDURE — 1126F AMNT PAIN NOTED NONE PRSNT: CPT | Mod: CPTII,S$GLB,, | Performed by: NURSE PRACTITIONER

## 2022-12-06 PROCEDURE — 3078F DIAST BP <80 MM HG: CPT | Mod: CPTII,S$GLB,, | Performed by: NURSE PRACTITIONER

## 2022-12-06 PROCEDURE — 1160F PR REVIEW ALL MEDS BY PRESCRIBER/CLIN PHARMACIST DOCUMENTED: ICD-10-PCS | Mod: CPTII,S$GLB,, | Performed by: NURSE PRACTITIONER

## 2022-12-06 PROCEDURE — 99999 PR PBB SHADOW E&M-EST. PATIENT-LVL III: CPT | Mod: PBBFAC,,, | Performed by: NURSE PRACTITIONER

## 2022-12-06 PROCEDURE — 99212 PR OFFICE/OUTPT VISIT, EST, LEVL II, 10-19 MIN: ICD-10-PCS | Mod: S$GLB,,, | Performed by: NURSE PRACTITIONER

## 2022-12-06 PROCEDURE — 3075F PR MOST RECENT SYSTOLIC BLOOD PRESS GE 130-139MM HG: ICD-10-PCS | Mod: CPTII,S$GLB,, | Performed by: NURSE PRACTITIONER

## 2022-12-06 PROCEDURE — 1157F ADVNC CARE PLAN IN RCRD: CPT | Mod: CPTII,S$GLB,, | Performed by: NURSE PRACTITIONER

## 2022-12-06 PROCEDURE — 93000 EKG 12-LEAD: ICD-10-PCS | Mod: S$GLB,,, | Performed by: INTERNAL MEDICINE

## 2022-12-06 PROCEDURE — 1101F PT FALLS ASSESS-DOCD LE1/YR: CPT | Mod: CPTII,S$GLB,, | Performed by: NURSE PRACTITIONER

## 2022-12-06 PROCEDURE — 3075F SYST BP GE 130 - 139MM HG: CPT | Mod: CPTII,S$GLB,, | Performed by: NURSE PRACTITIONER

## 2022-12-06 PROCEDURE — 99212 OFFICE O/P EST SF 10 MIN: CPT | Mod: S$GLB,,, | Performed by: NURSE PRACTITIONER

## 2022-12-06 PROCEDURE — 1101F PR PT FALLS ASSESS DOC 0-1 FALLS W/OUT INJ PAST YR: ICD-10-PCS | Mod: CPTII,S$GLB,, | Performed by: NURSE PRACTITIONER

## 2022-12-06 PROCEDURE — 1159F MED LIST DOCD IN RCRD: CPT | Mod: CPTII,S$GLB,, | Performed by: NURSE PRACTITIONER

## 2022-12-06 PROCEDURE — 93000 ELECTROCARDIOGRAM COMPLETE: CPT | Mod: S$GLB,,, | Performed by: INTERNAL MEDICINE

## 2022-12-06 PROCEDURE — 3078F PR MOST RECENT DIASTOLIC BLOOD PRESSURE < 80 MM HG: ICD-10-PCS | Mod: CPTII,S$GLB,, | Performed by: NURSE PRACTITIONER

## 2022-12-06 PROCEDURE — 99999 PR PBB SHADOW E&M-EST. PATIENT-LVL III: ICD-10-PCS | Mod: PBBFAC,,, | Performed by: NURSE PRACTITIONER

## 2022-12-06 PROCEDURE — 1126F PR PAIN SEVERITY QUANTIFIED, NO PAIN PRESENT: ICD-10-PCS | Mod: CPTII,S$GLB,, | Performed by: NURSE PRACTITIONER

## 2022-12-06 PROCEDURE — 3288F FALL RISK ASSESSMENT DOCD: CPT | Mod: CPTII,S$GLB,, | Performed by: NURSE PRACTITIONER

## 2022-12-06 PROCEDURE — 1157F PR ADVANCE CARE PLAN OR EQUIV PRESENT IN MEDICAL RECORD: ICD-10-PCS | Mod: CPTII,S$GLB,, | Performed by: NURSE PRACTITIONER

## 2022-12-06 NOTE — PROGRESS NOTES
Cardiology    12/6/2022  10:37 AM    Problem list  Patient Active Problem List   Diagnosis    HTN (hypertension)    Hypercholesteremia    CAD (coronary artery disease)    Pulmonary nodule    Urine retention    Hx of myocardial infarction    Enlarged prostate    History of bladder cancer    Abdominal aortic aneurysm (AAA) without rupture    PVD (peripheral vascular disease)    Carotid stenosis, asymptomatic, bilateral    Hemoptysis    Centrilobular emphysema    Tobacco user    S/P PTCA (percutaneous transluminal coronary angioplasty)    Aortic arch atherosclerosis    History of endovascular stent graft for abdominal aortic aneurysm (AAA)    RBBB    Dyspnea on exertion    Localized edema    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  He is still smoking- not interested in smoking cessation at this time. Has to stop walking sometimes because of hip pain.  He stops for a minute or two and it improves.  He feels well overall- no complaints    Medications  Current Outpatient Medications   Medication Sig Dispense Refill    ascorbic acid, vitamin C, (VITAMIN C) 500 MG tablet Take 500 mg by mouth once daily.      aspirin (ECOTRIN) 81 MG EC tablet Take 81 mg by mouth once daily.      atenoloL (TENORMIN) 25 MG tablet Take 1 tablet (25 mg total) by mouth once daily. 90 tablet 0    atorvastatin (LIPITOR) 40 MG tablet Take 1 tablet (40 mg total) by mouth once daily. 90 tablet 0    clopidogreL (PLAVIX) 75 mg tablet Take 1 tablet (75 mg total) by mouth once daily. 90 tablet 0    ergocalciferol (VITAMIN D2) 50,000 unit Cap TAKE 1 CAPSULE BY MOUTH  EVERY 30 days 12 capsule 0    finasteride (PROSCAR) 5 mg tablet Take 1 tablet (5 mg total) by mouth once daily. 90 tablet 0    tamsulosin (FLOMAX) 0.4 mg Cap Take 1 capsule (0.4 mg total) by mouth once daily. 90 capsule 0    vit A/vit C/vit  E/zinc/copper (ICAPS AREDS ORAL) Take by mouth 2 (two) times a day.      ipratropium (ATROVENT) 42 mcg (0.06 %) nasal spray SMARTSI-2 Spray(s) Both Nares Every 6 Hours PRN       No current facility-administered medications for this visit.      Prior to Admission medications    Medication Sig Start Date End Date Taking? Authorizing Provider   ascorbic acid, vitamin C, (VITAMIN C) 500 MG tablet Take 500 mg by mouth once daily.   Yes Historical Provider   aspirin (ECOTRIN) 81 MG EC tablet Take 81 mg by mouth once daily.   Yes Historical Provider   atenoloL (TENORMIN) 25 MG tablet Take 1 tablet (25 mg total) by mouth once daily. 22  Yes Emelia Snyder MD   atorvastatin (LIPITOR) 40 MG tablet Take 1 tablet (40 mg total) by mouth once daily. 22  Yes Emelia Snyder MD   clopidogreL (PLAVIX) 75 mg tablet Take 1 tablet (75 mg total) by mouth once daily. 22  Yes Emelia Snyder MD   ergocalciferol (VITAMIN D2) 50,000 unit Cap TAKE 1 CAPSULE BY MOUTH  EVERY 30 days 22  Yes Emelia Snyder MD   finasteride (PROSCAR) 5 mg tablet Take 1 tablet (5 mg total) by mouth once daily. 22  Yes Emelia Snyder MD   tamsulosin (FLOMAX) 0.4 mg Cap Take 1 capsule (0.4 mg total) by mouth once daily. 22  Yes Emelia Snyder MD   vit A/vit C/vit E/zinc/copper (ICAPS AREDS ORAL) Take by mouth 2 (two) times a day.   Yes Historical Provider   ipratropium (ATROVENT) 42 mcg (0.06 %) nasal spray SMARTSI-2 Spray(s) Both Nares Every 6 Hours PRN 22   Historical Provider         History  Past Medical History:   Diagnosis Date    AAA (abdominal aortic aneurysm) without rupture     pt with h/o aaa     Acute coronary syndrome     Cancer     bladder    Carotid artery occlusion     COPD (chronic obstructive pulmonary disease)     Coronary artery disease     Enlarged prostate     History of bladder cancer     Hyperlipidemia     Hypertension     Lung abnormality     spot on lung under care of pulmonologist at  [facility] Dr [facility]    Myocardial infarct 1988    Urine retention      Past Surgical History:   Procedure Laterality Date    ABDOMINAL AORTIC ANEURYSM REPAIR  2007    BLADDER SURGERY      CARDIAC CATHETERIZATION      CORONARY ANGIOPLASTY      CORONARY STENT PLACEMENT      FRACTURE SURGERY      arm    PROSTATE SURGERY      ROBOTIC BRONCHOSCOPY N/A 5/31/2022    Procedure: ROBOTIC BRONCHOSCOPY;  Surgeon: Mickey Acosta MD;  Location: [facility] OR [address] [place] [address];  Service: Pulmonary;  Laterality: N/A;    TONSILLECTOMY       Social History     Socioeconomic History    Marital status:    Occupational History    Occupation: retired    Tobacco Use    Smoking status: Every Day     Packs/day: 0.50     Years: 60.00     Pack years: 30.00     Types: Cigarettes    Smokeless tobacco: Never   Substance and Sexual Activity    Alcohol use: Yes     Alcohol/week: 1.0 standard drink     Types: 1 Shots of liquor per week     Comment:  five drink per week     Drug use: No    Sexual activity: Yes     Partners: Female     Birth control/protection: None         Allergies  Review of patient's allergies indicates:  No Known Allergies      Review of Systems   Review of Systems   Constitutional: Negative for diaphoresis and malaise/fatigue.   HENT: Negative.     Cardiovascular:  Negative for chest pain, claudication, dyspnea on exertion, irregular heartbeat, leg swelling, near-syncope, orthopnea, palpitations, paroxysmal nocturnal dyspnea and syncope.   Respiratory:  Negative for shortness of breath.    Endocrine: Negative for polydipsia, polyphagia and polyuria.   Hematologic/Lymphatic: Does not bruise/bleed easily.   Musculoskeletal:  Positive for joint pain.   Gastrointestinal:  Negative for bloating, nausea and vomiting.   Genitourinary: Negative.    Neurological:  Negative for excessive daytime sleepiness, dizziness, light-headedness, loss of balance and weakness.   Psychiatric/Behavioral:  The patient is not nervous/anxious.     Allergic/Immunologic: Negative.        Physical Exam  Wt Readings from Last 1 Encounters:   12/06/22 63.3 kg (139 lb 8.8 oz)     BP Readings from Last 3 Encounters:   12/06/22 138/68   10/07/22 133/62   10/03/22 (!) 99/59     Pulse Readings from Last 1 Encounters:   12/06/22 (!) 58     Body mass index is 20.02 kg/m².    Physical Exam  Vitals and nursing note reviewed.   Constitutional:       Appearance: Normal appearance.   HENT:      Head: Normocephalic and atraumatic.      Mouth/Throat:      Mouth: Mucous membranes are moist.   Eyes:      Pupils: Pupils are equal, round, and reactive to light.   Cardiovascular:      Rate and Rhythm: Normal rate and regular rhythm.      Pulses:           Radial pulses are 2+ on the right side and 2+ on the left side.        Dorsalis pedis pulses are 2+ on the right side and 2+ on the left side.        Posterior tibial pulses are 2+ on the right side and 2+ on the left side.      Heart sounds: No murmur heard.  Pulmonary:      Effort: Pulmonary effort is normal. No respiratory distress.      Comments: Diminished posteriorly  Scattered wheezes  Abdominal:      General: There is no distension.      Tenderness: There is no abdominal tenderness.   Musculoskeletal:      Cervical back: Normal range of motion.      Right lower leg: No edema.      Left lower leg: No edema.   Skin:     General: Skin is warm and dry.      Findings: No erythema.   Neurological:      General: No focal deficit present.      Mental Status: He is alert.   Psychiatric:         Mood and Affect: Mood normal.         Behavior: Behavior normal.       Problem List Items Addressed This Visit          Cardiac/Vascular    HTN (hypertension) (Chronic)    Overview     Home log reviewed  Well controlled  Continue meds as now         Current Assessment & Plan     As above         Hx of myocardial infarction - Primary    Relevant Orders    IN OFFICE EKG 12-LEAD (to Muse)    PVD (peripheral vascular disease)    Overview      Recommend smoking cessation  Continue walking program         Current Assessment & Plan     As above         RBBB    Overview     Noted on past EKG  Stable         Current Assessment & Plan     As above                          Follow Up        @Tana Luciano, VI

## 2023-01-31 PROBLEM — E04.2 MULTIPLE THYROID NODULES: Status: ACTIVE | Noted: 2023-01-31

## 2023-01-31 PROBLEM — E03.9 THYROID ACTIVITY DECREASED: Status: ACTIVE | Noted: 2023-01-31

## 2023-02-02 ENCOUNTER — OFFICE VISIT (OUTPATIENT)
Dept: UROLOGY | Facility: CLINIC | Age: 83
End: 2023-02-02
Payer: MEDICARE

## 2023-02-02 VITALS
WEIGHT: 137.69 LBS | SYSTOLIC BLOOD PRESSURE: 107 MMHG | BODY MASS INDEX: 19.71 KG/M2 | HEIGHT: 70 IN | DIASTOLIC BLOOD PRESSURE: 67 MMHG | HEART RATE: 63 BPM

## 2023-02-02 DIAGNOSIS — N47.1 PHIMOSIS: Primary | ICD-10-CM

## 2023-02-02 PROCEDURE — 1157F PR ADVANCE CARE PLAN OR EQUIV PRESENT IN MEDICAL RECORD: ICD-10-PCS | Mod: CPTII,S$GLB,, | Performed by: NURSE PRACTITIONER

## 2023-02-02 PROCEDURE — 99204 PR OFFICE/OUTPT VISIT, NEW, LEVL IV, 45-59 MIN: ICD-10-PCS | Mod: S$GLB,,, | Performed by: NURSE PRACTITIONER

## 2023-02-02 PROCEDURE — 3288F PR FALLS RISK ASSESSMENT DOCUMENTED: ICD-10-PCS | Mod: CPTII,S$GLB,, | Performed by: NURSE PRACTITIONER

## 2023-02-02 PROCEDURE — 1126F AMNT PAIN NOTED NONE PRSNT: CPT | Mod: CPTII,S$GLB,, | Performed by: NURSE PRACTITIONER

## 2023-02-02 PROCEDURE — 3288F FALL RISK ASSESSMENT DOCD: CPT | Mod: CPTII,S$GLB,, | Performed by: NURSE PRACTITIONER

## 2023-02-02 PROCEDURE — 1160F PR REVIEW ALL MEDS BY PRESCRIBER/CLIN PHARMACIST DOCUMENTED: ICD-10-PCS | Mod: CPTII,S$GLB,, | Performed by: NURSE PRACTITIONER

## 2023-02-02 PROCEDURE — 3078F PR MOST RECENT DIASTOLIC BLOOD PRESSURE < 80 MM HG: ICD-10-PCS | Mod: CPTII,S$GLB,, | Performed by: NURSE PRACTITIONER

## 2023-02-02 PROCEDURE — 1160F RVW MEDS BY RX/DR IN RCRD: CPT | Mod: CPTII,S$GLB,, | Performed by: NURSE PRACTITIONER

## 2023-02-02 PROCEDURE — 3074F PR MOST RECENT SYSTOLIC BLOOD PRESSURE < 130 MM HG: ICD-10-PCS | Mod: CPTII,S$GLB,, | Performed by: NURSE PRACTITIONER

## 2023-02-02 PROCEDURE — 1159F PR MEDICATION LIST DOCUMENTED IN MEDICAL RECORD: ICD-10-PCS | Mod: CPTII,S$GLB,, | Performed by: NURSE PRACTITIONER

## 2023-02-02 PROCEDURE — 1101F PT FALLS ASSESS-DOCD LE1/YR: CPT | Mod: CPTII,S$GLB,, | Performed by: NURSE PRACTITIONER

## 2023-02-02 PROCEDURE — 1159F MED LIST DOCD IN RCRD: CPT | Mod: CPTII,S$GLB,, | Performed by: NURSE PRACTITIONER

## 2023-02-02 PROCEDURE — 99204 OFFICE O/P NEW MOD 45 MIN: CPT | Mod: S$GLB,,, | Performed by: NURSE PRACTITIONER

## 2023-02-02 PROCEDURE — 99999 PR PBB SHADOW E&M-EST. PATIENT-LVL IV: ICD-10-PCS | Mod: PBBFAC,,, | Performed by: NURSE PRACTITIONER

## 2023-02-02 PROCEDURE — 1126F PR PAIN SEVERITY QUANTIFIED, NO PAIN PRESENT: ICD-10-PCS | Mod: CPTII,S$GLB,, | Performed by: NURSE PRACTITIONER

## 2023-02-02 PROCEDURE — 99999 PR PBB SHADOW E&M-EST. PATIENT-LVL IV: CPT | Mod: PBBFAC,,, | Performed by: NURSE PRACTITIONER

## 2023-02-02 PROCEDURE — 3074F SYST BP LT 130 MM HG: CPT | Mod: CPTII,S$GLB,, | Performed by: NURSE PRACTITIONER

## 2023-02-02 PROCEDURE — 3078F DIAST BP <80 MM HG: CPT | Mod: CPTII,S$GLB,, | Performed by: NURSE PRACTITIONER

## 2023-02-02 PROCEDURE — 1157F ADVNC CARE PLAN IN RCRD: CPT | Mod: CPTII,S$GLB,, | Performed by: NURSE PRACTITIONER

## 2023-02-02 PROCEDURE — 1101F PR PT FALLS ASSESS DOC 0-1 FALLS W/OUT INJ PAST YR: ICD-10-PCS | Mod: CPTII,S$GLB,, | Performed by: NURSE PRACTITIONER

## 2023-02-02 RX ORDER — CLOTRIMAZOLE AND BETAMETHASONE DIPROPIONATE 10; .64 MG/G; MG/G
CREAM TOPICAL 2 TIMES DAILY
Qty: 1 EACH | Refills: 6 | Status: SHIPPED | OUTPATIENT
Start: 2023-02-02

## 2023-02-02 NOTE — PROGRESS NOTES
"Subjective:      Martell Whitaker is a 82 y.o. male who presents for evaluation of phimosis.      Patient reports inability to retract foreskin for approximately 1 month.  Denies penile pain, denies penile discharge, denies hematuria.  Denies paraphimosis/penile swelling.  States that this has happened once before and he was prescribed a topical cream for treatment.  No history of diabetes, balanitis.    The following portions of the patient's history were reviewed and updated as appropriate: allergies, current medications, past family history, past medical history, past social history, past surgical history and problem list.    Review of Systems  Constitutional: no fever or chills  ENT: no nasal congestion or sore throat  Respiratory: no cough or shortness of breath  Cardiovascular: no chest pain or palpitations  Gastrointestinal: no nausea or vomiting, tolerating diet  Genitourinary: as per HPI  Hematologic/Lymphatic: no easy bruising or lymphadenopathy  Musculoskeletal: no arthralgias or myalgias  Neurological: no seizures or tremors  Behavioral/Psych: no auditory or visual hallucinations     Objective:   Vitals: /67 (BP Location: Right arm, Patient Position: Sitting, BP Method: Small (Automatic))   Pulse 63   Ht 5' 10" (1.778 m)   Wt 62.5 kg (137 lb 10.8 oz)   BMI 19.75 kg/m²     Physical Exam   General: alert and oriented, no acute distress  Head: normocephalic, atraumatic  Neck: supple, no lymphadenopathy, normal ROM, no masses  Respiratory: Symmetric expansion, non-labored breathing  Genitourinary:   Penis: uncircumcised, phimosis; no paraphimosis   Scrotum: no rashes or skin changes;   Testes: descended bilaterally, no masses, nontender, normal epididymides bilaterally, no hydroceles  Skin: normal coloration and turgor, no rashes, no suspicious skin lesions noted  Neuro: alert and oriented x3, no gross deficits  Psych: normal judgment and insight, normal mood/affect, and non-anxious    Physical " Exam    Lab Review   Urinalysis demonstrates no specimen    Lab Results   Component Value Date    WBC 6.72 12/28/2022    HGB 14.9 12/28/2022    HCT 46.1 12/28/2022    MCV 99 (H) 12/28/2022     12/28/2022     Lab Results   Component Value Date    CREATININE 1.0 01/25/2023    BUN 18 01/25/2023     Lab Results   Component Value Date    PSA 1.4 03/04/2019       Assessment and Plan:   1. Phimosis  --no evidence of balanitis/paraphimosis.  Patient currently not interested in circumcision.  Will proceed with topical treatment b.i.d. x6 weeks.  Patient aware that topical treatment may take longer and is okay with this plan.  --clotrimazole-betamethasone 1-0.05% (LOTRISONE) cream; Apply topically 2 (two) times daily.  Dispense: 1 each; Refill: 6  --ED precautions given if paraphimosis occurs  --return to clinic in 6 weeks for skin check    This note is dictated on M*Modal word recognition program.  There are word recognition mistakes that are occasionally missed on review.

## 2023-03-10 ENCOUNTER — OFFICE VISIT (OUTPATIENT)
Dept: CARDIOLOGY | Facility: CLINIC | Age: 83
End: 2023-03-10
Payer: MEDICARE

## 2023-03-10 VITALS
DIASTOLIC BLOOD PRESSURE: 59 MMHG | HEIGHT: 70 IN | HEART RATE: 83 BPM | OXYGEN SATURATION: 98 % | SYSTOLIC BLOOD PRESSURE: 131 MMHG | WEIGHT: 136.88 LBS | BODY MASS INDEX: 19.6 KG/M2

## 2023-03-10 DIAGNOSIS — I73.9 PVD (PERIPHERAL VASCULAR DISEASE): ICD-10-CM

## 2023-03-10 DIAGNOSIS — I10 PRIMARY HYPERTENSION: Chronic | ICD-10-CM

## 2023-03-10 PROCEDURE — 99212 OFFICE O/P EST SF 10 MIN: CPT | Mod: S$GLB,,, | Performed by: NURSE PRACTITIONER

## 2023-03-10 PROCEDURE — 3288F FALL RISK ASSESSMENT DOCD: CPT | Mod: CPTII,S$GLB,, | Performed by: NURSE PRACTITIONER

## 2023-03-10 PROCEDURE — 1160F PR REVIEW ALL MEDS BY PRESCRIBER/CLIN PHARMACIST DOCUMENTED: ICD-10-PCS | Mod: CPTII,S$GLB,, | Performed by: NURSE PRACTITIONER

## 2023-03-10 PROCEDURE — 1126F PR PAIN SEVERITY QUANTIFIED, NO PAIN PRESENT: ICD-10-PCS | Mod: CPTII,S$GLB,, | Performed by: NURSE PRACTITIONER

## 2023-03-10 PROCEDURE — 1101F PR PT FALLS ASSESS DOC 0-1 FALLS W/OUT INJ PAST YR: ICD-10-PCS | Mod: CPTII,S$GLB,, | Performed by: NURSE PRACTITIONER

## 2023-03-10 PROCEDURE — 3288F PR FALLS RISK ASSESSMENT DOCUMENTED: ICD-10-PCS | Mod: CPTII,S$GLB,, | Performed by: NURSE PRACTITIONER

## 2023-03-10 PROCEDURE — 3078F DIAST BP <80 MM HG: CPT | Mod: CPTII,S$GLB,, | Performed by: NURSE PRACTITIONER

## 2023-03-10 PROCEDURE — 3075F PR MOST RECENT SYSTOLIC BLOOD PRESS GE 130-139MM HG: ICD-10-PCS | Mod: CPTII,S$GLB,, | Performed by: NURSE PRACTITIONER

## 2023-03-10 PROCEDURE — 99212 PR OFFICE/OUTPT VISIT, EST, LEVL II, 10-19 MIN: ICD-10-PCS | Mod: S$GLB,,, | Performed by: NURSE PRACTITIONER

## 2023-03-10 PROCEDURE — 1157F ADVNC CARE PLAN IN RCRD: CPT | Mod: CPTII,S$GLB,, | Performed by: NURSE PRACTITIONER

## 2023-03-10 PROCEDURE — 1159F PR MEDICATION LIST DOCUMENTED IN MEDICAL RECORD: ICD-10-PCS | Mod: CPTII,S$GLB,, | Performed by: NURSE PRACTITIONER

## 2023-03-10 PROCEDURE — 99999 PR PBB SHADOW E&M-EST. PATIENT-LVL III: CPT | Mod: PBBFAC,,, | Performed by: NURSE PRACTITIONER

## 2023-03-10 PROCEDURE — 1159F MED LIST DOCD IN RCRD: CPT | Mod: CPTII,S$GLB,, | Performed by: NURSE PRACTITIONER

## 2023-03-10 PROCEDURE — 3078F PR MOST RECENT DIASTOLIC BLOOD PRESSURE < 80 MM HG: ICD-10-PCS | Mod: CPTII,S$GLB,, | Performed by: NURSE PRACTITIONER

## 2023-03-10 PROCEDURE — 99999 PR PBB SHADOW E&M-EST. PATIENT-LVL III: ICD-10-PCS | Mod: PBBFAC,,, | Performed by: NURSE PRACTITIONER

## 2023-03-10 PROCEDURE — 1160F RVW MEDS BY RX/DR IN RCRD: CPT | Mod: CPTII,S$GLB,, | Performed by: NURSE PRACTITIONER

## 2023-03-10 PROCEDURE — 3075F SYST BP GE 130 - 139MM HG: CPT | Mod: CPTII,S$GLB,, | Performed by: NURSE PRACTITIONER

## 2023-03-10 PROCEDURE — 1126F AMNT PAIN NOTED NONE PRSNT: CPT | Mod: CPTII,S$GLB,, | Performed by: NURSE PRACTITIONER

## 2023-03-10 PROCEDURE — 1101F PT FALLS ASSESS-DOCD LE1/YR: CPT | Mod: CPTII,S$GLB,, | Performed by: NURSE PRACTITIONER

## 2023-03-10 PROCEDURE — 1157F PR ADVANCE CARE PLAN OR EQUIV PRESENT IN MEDICAL RECORD: ICD-10-PCS | Mod: CPTII,S$GLB,, | Performed by: NURSE PRACTITIONER

## 2023-03-10 NOTE — PATIENT INSTRUCTIONS
Continue your current medications    Let us know if Urology decides to go ahead with any procedures     We will see you in 3 months

## 2023-03-10 NOTE — PROGRESS NOTES
Cardiology    3/17/2023  10:33 AM    Problem list  Patient Active Problem List   Diagnosis    HTN (hypertension)    Hypercholesteremia    CAD (coronary artery disease)    Pulmonary nodule    Urine retention    Hx of myocardial infarction    Enlarged prostate    History of bladder cancer    Abdominal aortic aneurysm (AAA) without rupture    PVD (peripheral vascular disease)    Carotid stenosis, asymptomatic, bilateral    Hemoptysis    Centrilobular emphysema    Tobacco user    S/P PTCA (percutaneous transluminal coronary angioplasty)    Aortic arch atherosclerosis    History of endovascular stent graft for abdominal aortic aneurysm (AAA)    RBBB    Dyspnea on exertion    Localized edema    Adenocarcinoma of right upper lobe of lung    Personal history of lung cancer    Multiple thyroid nodules    Thyroid activity decreased       CC:  CAD    HPI:  Has BP checked at [facility] on [address] and has been doing well.  No chest pain or SoB. Smoking same amount.      Medications  Current Outpatient Medications   Medication Sig Dispense Refill    ascorbic acid, vitamin C, (VITAMIN C) 500 MG tablet Take 500 mg by mouth once daily.      aspirin (ECOTRIN) 81 MG EC tablet Take 81 mg by mouth once daily.      atenoloL (TENORMIN) 25 MG tablet Take 1 tablet (25 mg total) by mouth once daily. 90 tablet 0    atorvastatin (LIPITOR) 40 MG tablet Take 1 tablet (40 mg total) by mouth once daily. 90 tablet 0    clopidogreL (PLAVIX) 75 mg tablet Take 1 tablet (75 mg total) by mouth once daily. 90 tablet 0    clotrimazole-betamethasone 1-0.05% (LOTRISONE) cream Apply topically 2 (two) times daily. 1 each 6    ergocalciferol (VITAMIN D2) 50,000 unit Cap TAKE 1 CAPSULE BY MOUTH  EVERY 30 days 12 capsule 0    finasteride (PROSCAR) 5 mg tablet Take 1 tablet (5 mg total) by mouth once daily. 90 tablet 0    levothyroxine (SYNTHROID) 25 MCG tablet TAKE 1 TABLET BY MOUTH BEFORE  BREAKFAST TAKEN IN THE MORNING  ON EMPTY STOMACH AND NOT TO EAT  AT  LEAST FOR 1/2 HOUR AFTER 90 tablet 0    potassium chloride (KLOR-CON) 10 MEQ TbSR Take 1 tablet (10 mEq total) by mouth once daily. 90 tablet 0    tamsulosin (FLOMAX) 0.4 mg Cap Take 1 capsule (0.4 mg total) by mouth once daily. 90 capsule 0    vit A/vit C/vit E/zinc/copper (ICAPS AREDS ORAL) Take by mouth 2 (two) times a day.       No current facility-administered medications for this visit.      Prior to Admission medications    Medication Sig Start Date End Date Taking? Authorizing Provider   ascorbic acid, vitamin C, (VITAMIN C) 500 MG tablet Take 500 mg by mouth once daily.   Yes Historical Provider   aspirin (ECOTRIN) 81 MG EC tablet Take 81 mg by mouth once daily.   Yes Historical Provider   atenoloL (TENORMIN) 25 MG tablet Take 1 tablet (25 mg total) by mouth once daily. 2/15/23  Yes Emelia Snyder MD   atorvastatin (LIPITOR) 40 MG tablet Take 1 tablet (40 mg total) by mouth once daily. 2/15/23  Yes Emelia Snyder MD   clopidogreL (PLAVIX) 75 mg tablet Take 1 tablet (75 mg total) by mouth once daily. 2/15/23  Yes Emelia Snyder MD   clotrimazole-betamethasone 1-0.05% (LOTRISONE) cream Apply topically 2 (two) times daily. 2/2/23  Yes Concepcion Leon, NP   ergocalciferol (VITAMIN D2) 50,000 unit Cap TAKE 1 CAPSULE BY MOUTH  EVERY 30 days 2/15/23  Yes Emelia Snyder MD   finasteride (PROSCAR) 5 mg tablet Take 1 tablet (5 mg total) by mouth once daily. 2/15/23  Yes Emelia Snyder MD   levothyroxine (SYNTHROID) 25 MCG tablet TAKE 1 TABLET BY MOUTH BEFORE  BREAKFAST TAKEN IN THE MORNING  ON EMPTY STOMACH AND NOT TO EAT  AT LEAST FOR 1/2 HOUR AFTER 2/15/23  Yes Emelia Snyder MD   potassium chloride (KLOR-CON) 10 MEQ TbSR Take 1 tablet (10 mEq total) by mouth once daily. 2/15/23  Yes Emelia Snyder MD   tamsulosin (FLOMAX) 0.4 mg Cap Take 1 capsule (0.4 mg total) by mouth once daily. 2/15/23  Yes Emelia Snyder MD   vit A/vit C/vit E/zinc/copper (ICAPS AREDS ORAL) Take by mouth 2 (two)  times a day.   Yes Historical Provider         History  Past Medical History:   Diagnosis Date    AAA (abdominal aortic aneurysm) without rupture     pt with h/o aaa     Acute coronary syndrome     Cancer     bladder    Carotid artery occlusion     COPD (chronic obstructive pulmonary disease)     Coronary artery disease     Enlarged prostate     History of bladder cancer     Hyperlipidemia     Hypertension     Lung abnormality     spot on lung under care of pulmonologist at [facility] Dr [facility]    Multiple thyroid nodules 1/31/2023    Myocardial infarct 1988    Urine retention      Past Surgical History:   Procedure Laterality Date    ABDOMINAL AORTIC ANEURYSM REPAIR  2007    BLADDER SURGERY      CARDIAC CATHETERIZATION      CORONARY ANGIOPLASTY      CORONARY STENT PLACEMENT      FRACTURE SURGERY      arm    PROSTATE SURGERY      ROBOTIC BRONCHOSCOPY N/A 5/31/2022    Procedure: ROBOTIC BRONCHOSCOPY;  Surgeon: Mickey Acosta MD;  Location: [facility] OR [address] [place] [address];  Service: Pulmonary;  Laterality: N/A;    TONSILLECTOMY       Social History     Socioeconomic History    Marital status:    Occupational History    Occupation: retired    Tobacco Use    Smoking status: Every Day     Packs/day: 0.50     Years: 60.00     Pack years: 30.00     Types: Cigarettes    Smokeless tobacco: Never   Substance and Sexual Activity    Alcohol use: Yes     Alcohol/week: 1.0 standard drink     Types: 1 Shots of liquor per week     Comment:  five drink per week     Drug use: No    Sexual activity: Yes     Partners: Female     Birth control/protection: None         Allergies  Review of patient's allergies indicates:  No Known Allergies      Review of Systems   Review of Systems   Constitutional: Negative for diaphoresis and malaise/fatigue.   HENT: Negative.     Cardiovascular:  Negative for chest pain, claudication, dyspnea on exertion, irregular heartbeat, leg swelling, near-syncope, orthopnea, palpitations, paroxysmal nocturnal dyspnea  and syncope.   Respiratory:  Negative for shortness of breath.    Endocrine: Negative for polydipsia, polyphagia and polyuria.   Hematologic/Lymphatic: Does not bruise/bleed easily.   Gastrointestinal:  Negative for bloating, nausea and vomiting.   Genitourinary: Negative.    Neurological:  Negative for excessive daytime sleepiness, dizziness, light-headedness, loss of balance and weakness.   Psychiatric/Behavioral:  The patient is not nervous/anxious.    Allergic/Immunologic: Negative.        Physical Exam  Wt Readings from Last 1 Encounters:   03/15/23 62.7 kg (138 lb 3.7 oz)     BP Readings from Last 3 Encounters:   03/15/23 124/79   03/10/23 (!) 131/59   02/15/23 120/60     Pulse Readings from Last 1 Encounters:   03/15/23 (!) 54     Body mass index is 19.64 kg/m².    Physical Exam  Vitals and nursing note reviewed.   Constitutional:       Appearance: Normal appearance.   HENT:      Head: Normocephalic and atraumatic.      Mouth/Throat:      Mouth: Mucous membranes are moist.   Eyes:      Pupils: Pupils are equal, round, and reactive to light.   Cardiovascular:      Rate and Rhythm: Normal rate and regular rhythm.      Pulses:           Radial pulses are 2+ on the right side and 2+ on the left side.        Dorsalis pedis pulses are 2+ on the right side and 2+ on the left side.        Posterior tibial pulses are 2+ on the right side and 2+ on the left side.      Heart sounds: No murmur heard.  Pulmonary:      Effort: Pulmonary effort is normal. No respiratory distress.      Comments: Diminished posteriorly  Scattered wheezes  Abdominal:      General: There is no distension.      Tenderness: There is no abdominal tenderness.   Musculoskeletal:      Cervical back: Normal range of motion.      Right lower leg: No edema.      Left lower leg: No edema.   Skin:     General: Skin is warm and dry.      Findings: No erythema.   Neurological:      General: No focal deficit present.      Mental Status: He is alert.    Psychiatric:         Mood and Affect: Mood normal.         Behavior: Behavior normal.               Problem List Items Addressed This Visit          Cardiac/Vascular    HTN (hypertension) (Chronic)    Overview     Home log reviewed  Well controlled  Continue meds as now         Current Assessment & Plan     As above         PVD (peripheral vascular disease)    Overview     Recommend smoking cessation  Continue walking program              Follow Up  3 months      @Tana Luciano DNP

## 2023-03-14 NOTE — PROGRESS NOTES
Subjective:      Martell Whitaker is a 82 y.o. male who presents for evaluation of phimosis.      HPI: Patient reports inability to retract foreskin for approximately 1 month.  Denies penile pain, denies penile discharge, denies hematuria.  Denies paraphimosis/penile swelling.  States that this has happened once before and he was prescribed a topical cream for treatment.  No history of diabetes, balanitis.  Today, he denies improvement in phimosis.  Has been using Lotrisone b.i.d. since his previous visit 1 month ago.  He expresses concern over developing balanitis due to phimosis and wonders if he should proceed with circumcision.     The following portions of the patient's history were reviewed and updated as appropriate: allergies, current medications, past family history, past medical history, past social history, past surgical history and problem list.    Review of Systems  A comprehensive multipoint review of systems was negative except as otherwise stated in the HPI.   Objective:   Vitals: /79 (BP Location: Left arm, Patient Position: Sitting, BP Method: Small (Automatic))   Pulse (!) 54   Wt 62.7 kg (138 lb 3.7 oz)   BMI 19.83 kg/m²     Physical Exam   General: alert and oriented, no acute distress  Head: normocephalic, atraumatic  Neck: supple, no lymphadenopathy, normal ROM, no masses  Respiratory: Symmetric expansion, non-labored breathing  Genitourinary:   Penis: uncircumcised, phimosis; no paraphimosis   Scrotum: no rashes or skin changes;   Testes: descended bilaterally, no masses, nontender, normal epididymides bilaterally, no hydroceles  Skin: normal coloration and turgor, no rashes, no suspicious skin lesions noted  Neuro: alert and oriented x3, no gross deficits  Psych: normal judgment and insight, normal mood/affect, and non-anxious    Physical Exam    Lab Review   Urinalysis demonstrates no specimen    Lab Results   Component Value Date    WBC 6.72 12/28/2022    HGB 14.9 12/28/2022     HCT 46.1 12/28/2022    MCV 99 (H) 12/28/2022     12/28/2022     Lab Results   Component Value Date    CREATININE 1.0 01/25/2023    BUN 18 01/25/2023     Lab Results   Component Value Date    PSA 1.4 03/04/2019       Assessment and Plan:   1. Phimosis  --no evidence of balanitis/paraphimosis.  Patient is aware that proceeding with circumcision would be an elective procedure that would require him to hold his Plavix.  I currently recommend that he monitor for symptoms of balanitis or paraphimosis and proceed with circumcision if needed.  He expresses understanding and is in agreement but would also like to discuss proceeding with circumcision with Dr. Gonzalez.  --ED precautions given if paraphimosis occurs  --f/u dr gonzalez NA    This note is dictated on M*Modal word recognition program.  There are word recognition mistakes that are occasionally missed on review.

## 2023-03-15 ENCOUNTER — OFFICE VISIT (OUTPATIENT)
Dept: UROLOGY | Facility: CLINIC | Age: 83
End: 2023-03-15
Payer: MEDICARE

## 2023-03-15 VITALS
BODY MASS INDEX: 19.83 KG/M2 | HEART RATE: 54 BPM | WEIGHT: 138.25 LBS | DIASTOLIC BLOOD PRESSURE: 79 MMHG | SYSTOLIC BLOOD PRESSURE: 124 MMHG

## 2023-03-15 DIAGNOSIS — N47.1 PHIMOSIS: Primary | ICD-10-CM

## 2023-03-15 PROCEDURE — 1126F AMNT PAIN NOTED NONE PRSNT: CPT | Mod: CPTII,S$GLB,, | Performed by: NURSE PRACTITIONER

## 2023-03-15 PROCEDURE — 1126F PR PAIN SEVERITY QUANTIFIED, NO PAIN PRESENT: ICD-10-PCS | Mod: CPTII,S$GLB,, | Performed by: NURSE PRACTITIONER

## 2023-03-15 PROCEDURE — 3078F DIAST BP <80 MM HG: CPT | Mod: CPTII,S$GLB,, | Performed by: NURSE PRACTITIONER

## 2023-03-15 PROCEDURE — 1157F ADVNC CARE PLAN IN RCRD: CPT | Mod: CPTII,S$GLB,, | Performed by: NURSE PRACTITIONER

## 2023-03-15 PROCEDURE — 1157F PR ADVANCE CARE PLAN OR EQUIV PRESENT IN MEDICAL RECORD: ICD-10-PCS | Mod: CPTII,S$GLB,, | Performed by: NURSE PRACTITIONER

## 2023-03-15 PROCEDURE — 1159F PR MEDICATION LIST DOCUMENTED IN MEDICAL RECORD: ICD-10-PCS | Mod: CPTII,S$GLB,, | Performed by: NURSE PRACTITIONER

## 2023-03-15 PROCEDURE — 99999 PR PBB SHADOW E&M-EST. PATIENT-LVL III: ICD-10-PCS | Mod: PBBFAC,,, | Performed by: NURSE PRACTITIONER

## 2023-03-15 PROCEDURE — 99214 OFFICE O/P EST MOD 30 MIN: CPT | Mod: S$GLB,,, | Performed by: NURSE PRACTITIONER

## 2023-03-15 PROCEDURE — 3288F PR FALLS RISK ASSESSMENT DOCUMENTED: ICD-10-PCS | Mod: CPTII,S$GLB,, | Performed by: NURSE PRACTITIONER

## 2023-03-15 PROCEDURE — 3078F PR MOST RECENT DIASTOLIC BLOOD PRESSURE < 80 MM HG: ICD-10-PCS | Mod: CPTII,S$GLB,, | Performed by: NURSE PRACTITIONER

## 2023-03-15 PROCEDURE — 1101F PT FALLS ASSESS-DOCD LE1/YR: CPT | Mod: CPTII,S$GLB,, | Performed by: NURSE PRACTITIONER

## 2023-03-15 PROCEDURE — 1159F MED LIST DOCD IN RCRD: CPT | Mod: CPTII,S$GLB,, | Performed by: NURSE PRACTITIONER

## 2023-03-15 PROCEDURE — 3288F FALL RISK ASSESSMENT DOCD: CPT | Mod: CPTII,S$GLB,, | Performed by: NURSE PRACTITIONER

## 2023-03-15 PROCEDURE — 99214 PR OFFICE/OUTPT VISIT, EST, LEVL IV, 30-39 MIN: ICD-10-PCS | Mod: S$GLB,,, | Performed by: NURSE PRACTITIONER

## 2023-03-15 PROCEDURE — 1160F RVW MEDS BY RX/DR IN RCRD: CPT | Mod: CPTII,S$GLB,, | Performed by: NURSE PRACTITIONER

## 2023-03-15 PROCEDURE — 3074F SYST BP LT 130 MM HG: CPT | Mod: CPTII,S$GLB,, | Performed by: NURSE PRACTITIONER

## 2023-03-15 PROCEDURE — 99999 PR PBB SHADOW E&M-EST. PATIENT-LVL III: CPT | Mod: PBBFAC,,, | Performed by: NURSE PRACTITIONER

## 2023-03-15 PROCEDURE — 1101F PR PT FALLS ASSESS DOC 0-1 FALLS W/OUT INJ PAST YR: ICD-10-PCS | Mod: CPTII,S$GLB,, | Performed by: NURSE PRACTITIONER

## 2023-03-15 PROCEDURE — 3074F PR MOST RECENT SYSTOLIC BLOOD PRESSURE < 130 MM HG: ICD-10-PCS | Mod: CPTII,S$GLB,, | Performed by: NURSE PRACTITIONER

## 2023-03-15 PROCEDURE — 1160F PR REVIEW ALL MEDS BY PRESCRIBER/CLIN PHARMACIST DOCUMENTED: ICD-10-PCS | Mod: CPTII,S$GLB,, | Performed by: NURSE PRACTITIONER

## 2023-04-21 ENCOUNTER — HOSPITAL ENCOUNTER (OUTPATIENT)
Dept: RADIOLOGY | Facility: HOSPITAL | Age: 83
Discharge: HOME | End: 2023-04-21
Attending: RADIOLOGY
Payer: MEDICARE

## 2023-04-21 ENCOUNTER — TELEPHONE (OUTPATIENT)
Dept: RADIATION ONCOLOGY | Facility: CLINIC | Age: 83
End: 2023-04-21
Payer: MEDICARE

## 2023-04-21 DIAGNOSIS — Z85.118 PERSONAL HISTORY OF LUNG CANCER: ICD-10-CM

## 2023-04-21 PROCEDURE — 71250 CT THORAX DX C-: CPT | Mod: 26,,, | Performed by: STUDENT IN AN ORGANIZED HEALTH CARE EDUCATION/TRAINING PROGRAM

## 2023-04-21 PROCEDURE — 71250 CT THORAX DX C-: CPT | Mod: TC

## 2023-04-21 PROCEDURE — 71250 CT CHEST WITHOUT CONTRAST: ICD-10-PCS | Mod: 26,,, | Performed by: STUDENT IN AN ORGANIZED HEALTH CARE EDUCATION/TRAINING PROGRAM

## 2023-04-21 NOTE — TELEPHONE ENCOUNTER
Spoke with patient regarding appts. Pt was advised to keep the ct appt and will be notified with follow up appt.

## 2023-04-25 ENCOUNTER — OFFICE VISIT (OUTPATIENT)
Dept: RADIATION ONCOLOGY | Facility: CLINIC | Age: 83
End: 2023-04-25
Payer: MEDICARE

## 2023-04-25 DIAGNOSIS — Z85.118 PERSONAL HISTORY OF LUNG CANCER: Primary | ICD-10-CM

## 2023-04-25 PROCEDURE — 99024 PR POST-OP FOLLOW-UP VISIT: ICD-10-PCS | Mod: 95,,, | Performed by: RADIOLOGY

## 2023-04-25 PROCEDURE — 1157F ADVNC CARE PLAN IN RCRD: CPT | Mod: CPTII,95,, | Performed by: RADIOLOGY

## 2023-04-25 PROCEDURE — 1157F PR ADVANCE CARE PLAN OR EQUIV PRESENT IN MEDICAL RECORD: ICD-10-PCS | Mod: CPTII,95,, | Performed by: RADIOLOGY

## 2023-04-25 PROCEDURE — 99024 POSTOP FOLLOW-UP VISIT: CPT | Mod: 95,,, | Performed by: RADIOLOGY

## 2023-04-25 NOTE — PROGRESS NOTES
Established Patient - Audio Only Telehealth Visit     The patient location is: [place], [place]  The chief complaint leading to consultation is: F/U after lung SBRT  Visit type: Virtual visit with audio only (telephone)  Total time spent with patient: 3 minutes     The reason for the audio only service rather than synchronous audio and video virtual visit was related to technical difficulties or patient preference/necessity.     Each patient to whom I provide medical services by telemedicine is:  (1) informed of the relationship between the physician and patient and the respective role of any other health care provider with respect to management of the patient; and (2) notified that they may decline to receive medical services by telemedicine and may withdraw from such care at any time. Patient verbally consented to receive this service via voice-only telephone call.       HPI: Denies any dyspnea or chest pain since last clinic visit. CT Chest 4/21/23 demonstrated reduction in size of treated RUL primary and no evidence of disease.      Assessment and plan:  I will see him back in 6 months with CT Chest prior for re-staging.                         This service was not originating from a related E/M service provided within the previous 7 days nor will  to an E/M service or procedure within the next 24 hours or my soonest available appointment.  Prevailing standard of care was able to be met in this audio-only visit.

## 2023-04-26 ENCOUNTER — OFFICE VISIT (OUTPATIENT)
Dept: UROLOGY | Facility: CLINIC | Age: 83
End: 2023-04-26
Payer: MEDICARE

## 2023-04-26 VITALS
HEIGHT: 70 IN | HEART RATE: 51 BPM | BODY MASS INDEX: 19.96 KG/M2 | DIASTOLIC BLOOD PRESSURE: 78 MMHG | SYSTOLIC BLOOD PRESSURE: 139 MMHG | WEIGHT: 139.44 LBS

## 2023-04-26 DIAGNOSIS — N47.1 PHIMOSIS: Primary | ICD-10-CM

## 2023-04-26 PROCEDURE — 1157F PR ADVANCE CARE PLAN OR EQUIV PRESENT IN MEDICAL RECORD: ICD-10-PCS | Mod: CPTII,S$GLB,, | Performed by: UROLOGY

## 2023-04-26 PROCEDURE — 1159F PR MEDICATION LIST DOCUMENTED IN MEDICAL RECORD: ICD-10-PCS | Mod: CPTII,S$GLB,, | Performed by: UROLOGY

## 2023-04-26 PROCEDURE — 3078F DIAST BP <80 MM HG: CPT | Mod: CPTII,S$GLB,, | Performed by: UROLOGY

## 2023-04-26 PROCEDURE — 1157F ADVNC CARE PLAN IN RCRD: CPT | Mod: CPTII,S$GLB,, | Performed by: UROLOGY

## 2023-04-26 PROCEDURE — 1160F PR REVIEW ALL MEDS BY PRESCRIBER/CLIN PHARMACIST DOCUMENTED: ICD-10-PCS | Mod: CPTII,S$GLB,, | Performed by: UROLOGY

## 2023-04-26 PROCEDURE — 3078F PR MOST RECENT DIASTOLIC BLOOD PRESSURE < 80 MM HG: ICD-10-PCS | Mod: CPTII,S$GLB,, | Performed by: UROLOGY

## 2023-04-26 PROCEDURE — 99214 OFFICE O/P EST MOD 30 MIN: CPT | Mod: S$GLB,,, | Performed by: UROLOGY

## 2023-04-26 PROCEDURE — 99999 PR PBB SHADOW E&M-EST. PATIENT-LVL III: ICD-10-PCS | Mod: PBBFAC,,, | Performed by: UROLOGY

## 2023-04-26 PROCEDURE — 99999 PR PBB SHADOW E&M-EST. PATIENT-LVL III: CPT | Mod: PBBFAC,,, | Performed by: UROLOGY

## 2023-04-26 PROCEDURE — 3075F PR MOST RECENT SYSTOLIC BLOOD PRESS GE 130-139MM HG: ICD-10-PCS | Mod: CPTII,S$GLB,, | Performed by: UROLOGY

## 2023-04-26 PROCEDURE — 3075F SYST BP GE 130 - 139MM HG: CPT | Mod: CPTII,S$GLB,, | Performed by: UROLOGY

## 2023-04-26 PROCEDURE — 1159F MED LIST DOCD IN RCRD: CPT | Mod: CPTII,S$GLB,, | Performed by: UROLOGY

## 2023-04-26 PROCEDURE — 1160F RVW MEDS BY RX/DR IN RCRD: CPT | Mod: CPTII,S$GLB,, | Performed by: UROLOGY

## 2023-04-26 PROCEDURE — 99214 PR OFFICE/OUTPT VISIT, EST, LEVL IV, 30-39 MIN: ICD-10-PCS | Mod: S$GLB,,, | Performed by: UROLOGY

## 2023-04-26 RX ORDER — BETAMETHASONE DIPROPIONATE 0.5 MG/G
CREAM TOPICAL 2 TIMES DAILY
Qty: 45 G | Refills: 1 | Status: SHIPPED | OUTPATIENT
Start: 2023-04-26 | End: 2023-07-21

## 2023-04-26 NOTE — PROGRESS NOTES
"Subjective:      Martell Whitaker is a 82 y.o. male who returns today regarding his phimosis and balanitis.    Treated earlier this year for balanitis w/ Concepcion Leon NP, with resolution.    Here today to discuss possible circumcision.    He is on plavix for CAD. He also has h/o AAA with stent, h/o MI, emphysema, and lung CA.    The following portions of the patient's history were reviewed and updated as appropriate: allergies, current medications, past family history, past medical history, past social history, past surgical history and problem list.    Review of Systems  A comprehensive multipoint review of systems was negative except as otherwise stated in the HPI.     Objective:   Vitals: /78   Pulse (!) 51   Ht 5' 10" (1.778 m)   Wt 63.3 kg (139 lb 7.1 oz)   BMI 20.01 kg/m²     Physical Exam   General: alert and oriented, no acute distress  Respiratory: Symmetric expansion, non-labored breathing  Genitourinary: uncircumcised with severe phimosis  Neuro: no gross deficits  Psych: normal judgment and insight, normal mood/affect, and non-anxious    Lab Review     Lab Results   Component Value Date    WBC 6.58 04/19/2023    HGB 14.8 04/19/2023    HCT 46.5 04/19/2023    MCV 99 (H) 04/19/2023     04/19/2023     Lab Results   Component Value Date    CREATININE 1.0 04/19/2023    BUN 20 04/19/2023     Lab Results   Component Value Date    PSA 1.4 03/04/2019    PSADIAG 1.22 12/20/2022     Assessment and Plan:   1. Phimosis  -- Will trial betamethasone for 4 weeks with hope avoid need for surgery given medical problems  -- FU 1 mos       "

## 2023-06-08 ENCOUNTER — OFFICE VISIT (OUTPATIENT)
Dept: CARDIOLOGY | Facility: CLINIC | Age: 83
End: 2023-06-08
Payer: MEDICARE

## 2023-06-08 VITALS
WEIGHT: 131.63 LBS | HEIGHT: 70 IN | DIASTOLIC BLOOD PRESSURE: 60 MMHG | HEART RATE: 70 BPM | SYSTOLIC BLOOD PRESSURE: 102 MMHG | OXYGEN SATURATION: 94 % | BODY MASS INDEX: 18.85 KG/M2

## 2023-06-08 DIAGNOSIS — I10 HYPERTENSION, UNSPECIFIED TYPE: Chronic | ICD-10-CM

## 2023-06-08 DIAGNOSIS — I25.2 HX OF MYOCARDIAL INFARCTION: ICD-10-CM

## 2023-06-08 DIAGNOSIS — R06.09 DYSPNEA ON EXERTION: ICD-10-CM

## 2023-06-08 DIAGNOSIS — I10 ESSENTIAL HYPERTENSION: Chronic | ICD-10-CM

## 2023-06-08 DIAGNOSIS — E78.2 MIXED HYPERLIPIDEMIA: ICD-10-CM

## 2023-06-08 PROCEDURE — 1157F ADVNC CARE PLAN IN RCRD: CPT | Mod: CPTII,S$GLB,, | Performed by: NURSE PRACTITIONER

## 2023-06-08 PROCEDURE — 1126F AMNT PAIN NOTED NONE PRSNT: CPT | Mod: CPTII,S$GLB,, | Performed by: NURSE PRACTITIONER

## 2023-06-08 PROCEDURE — 1157F PR ADVANCE CARE PLAN OR EQUIV PRESENT IN MEDICAL RECORD: ICD-10-PCS | Mod: CPTII,S$GLB,, | Performed by: NURSE PRACTITIONER

## 2023-06-08 PROCEDURE — 1160F RVW MEDS BY RX/DR IN RCRD: CPT | Mod: CPTII,S$GLB,, | Performed by: NURSE PRACTITIONER

## 2023-06-08 PROCEDURE — 1101F PR PT FALLS ASSESS DOC 0-1 FALLS W/OUT INJ PAST YR: ICD-10-PCS | Mod: CPTII,S$GLB,, | Performed by: NURSE PRACTITIONER

## 2023-06-08 PROCEDURE — 1159F PR MEDICATION LIST DOCUMENTED IN MEDICAL RECORD: ICD-10-PCS | Mod: CPTII,S$GLB,, | Performed by: NURSE PRACTITIONER

## 2023-06-08 PROCEDURE — 1160F PR REVIEW ALL MEDS BY PRESCRIBER/CLIN PHARMACIST DOCUMENTED: ICD-10-PCS | Mod: CPTII,S$GLB,, | Performed by: NURSE PRACTITIONER

## 2023-06-08 PROCEDURE — 3078F PR MOST RECENT DIASTOLIC BLOOD PRESSURE < 80 MM HG: ICD-10-PCS | Mod: CPTII,S$GLB,, | Performed by: NURSE PRACTITIONER

## 2023-06-08 PROCEDURE — 3288F PR FALLS RISK ASSESSMENT DOCUMENTED: ICD-10-PCS | Mod: CPTII,S$GLB,, | Performed by: NURSE PRACTITIONER

## 2023-06-08 PROCEDURE — 99999 PR PBB SHADOW E&M-EST. PATIENT-LVL IV: ICD-10-PCS | Mod: PBBFAC,,, | Performed by: NURSE PRACTITIONER

## 2023-06-08 PROCEDURE — 99215 PR OFFICE/OUTPT VISIT, EST, LEVL V, 40-54 MIN: ICD-10-PCS | Mod: 25,S$GLB,, | Performed by: NURSE PRACTITIONER

## 2023-06-08 PROCEDURE — 3288F FALL RISK ASSESSMENT DOCD: CPT | Mod: CPTII,S$GLB,, | Performed by: NURSE PRACTITIONER

## 2023-06-08 PROCEDURE — 1159F MED LIST DOCD IN RCRD: CPT | Mod: CPTII,S$GLB,, | Performed by: NURSE PRACTITIONER

## 2023-06-08 PROCEDURE — 3074F SYST BP LT 130 MM HG: CPT | Mod: CPTII,S$GLB,, | Performed by: NURSE PRACTITIONER

## 2023-06-08 PROCEDURE — 1101F PT FALLS ASSESS-DOCD LE1/YR: CPT | Mod: CPTII,S$GLB,, | Performed by: NURSE PRACTITIONER

## 2023-06-08 PROCEDURE — 3074F PR MOST RECENT SYSTOLIC BLOOD PRESSURE < 130 MM HG: ICD-10-PCS | Mod: CPTII,S$GLB,, | Performed by: NURSE PRACTITIONER

## 2023-06-08 PROCEDURE — 3078F DIAST BP <80 MM HG: CPT | Mod: CPTII,S$GLB,, | Performed by: NURSE PRACTITIONER

## 2023-06-08 PROCEDURE — 94640 PR INHAL RX, AIRWAY OBST/DX SPUTUM INDUCT: ICD-10-PCS | Mod: S$GLB,,, | Performed by: NURSE PRACTITIONER

## 2023-06-08 PROCEDURE — 99999 PR PBB SHADOW E&M-EST. PATIENT-LVL IV: CPT | Mod: PBBFAC,,, | Performed by: NURSE PRACTITIONER

## 2023-06-08 PROCEDURE — 1126F PR PAIN SEVERITY QUANTIFIED, NO PAIN PRESENT: ICD-10-PCS | Mod: CPTII,S$GLB,, | Performed by: NURSE PRACTITIONER

## 2023-06-08 PROCEDURE — 94640 AIRWAY INHALATION TREATMENT: CPT | Mod: S$GLB,,, | Performed by: NURSE PRACTITIONER

## 2023-06-08 PROCEDURE — 99215 OFFICE O/P EST HI 40 MIN: CPT | Mod: 25,S$GLB,, | Performed by: NURSE PRACTITIONER

## 2023-06-08 RX ORDER — CLOPIDOGREL BISULFATE 75 MG/1
75 TABLET ORAL DAILY
Qty: 90 TABLET | Refills: 3 | Status: SHIPPED | OUTPATIENT
Start: 2023-06-08

## 2023-06-08 RX ORDER — ALBUTEROL SULFATE 90 UG/1
1-2 AEROSOL, METERED RESPIRATORY (INHALATION)
Qty: 18 G | Refills: 3 | Status: SHIPPED | OUTPATIENT
Start: 2023-06-08 | End: 2023-06-08

## 2023-06-08 RX ORDER — ATORVASTATIN CALCIUM 40 MG/1
40 TABLET, FILM COATED ORAL DAILY
Qty: 90 TABLET | Refills: 3 | Status: SHIPPED | OUTPATIENT
Start: 2023-06-08

## 2023-06-08 RX ORDER — AZITHROMYCIN 250 MG/1
TABLET, FILM COATED ORAL
Qty: 6 TABLET | Refills: 0 | Status: SHIPPED | OUTPATIENT
Start: 2023-06-08 | End: 2023-06-13

## 2023-06-08 RX ORDER — DUTASTERIDE 0.5 MG/1
CAPSULE, LIQUID FILLED ORAL
COMMUNITY
Start: 2023-04-20 | End: 2023-06-22 | Stop reason: SDUPTHER

## 2023-06-08 RX ORDER — PREDNISONE 20 MG/1
20 TABLET ORAL 2 TIMES DAILY
Qty: 10 TABLET | Refills: 0 | Status: SHIPPED | OUTPATIENT
Start: 2023-06-08

## 2023-06-08 RX ORDER — PREDNISONE 20 MG/1
20 TABLET ORAL 2 TIMES DAILY
Qty: 10 TABLET | Refills: 0 | Status: SHIPPED | OUTPATIENT
Start: 2023-06-08 | End: 2023-06-08

## 2023-06-08 RX ORDER — ALBUTEROL SULFATE 0.83 MG/ML
2.5 SOLUTION RESPIRATORY (INHALATION)
Status: COMPLETED | OUTPATIENT
Start: 2023-06-08 | End: 2023-06-08

## 2023-06-08 RX ORDER — AZITHROMYCIN 250 MG/1
TABLET, FILM COATED ORAL
Qty: 6 TABLET | Refills: 0 | Status: SHIPPED | OUTPATIENT
Start: 2023-06-08 | End: 2023-06-08

## 2023-06-08 RX ORDER — ATENOLOL 25 MG/1
25 TABLET ORAL DAILY
Qty: 90 TABLET | Refills: 3 | Status: SHIPPED | OUTPATIENT
Start: 2023-06-08

## 2023-06-08 RX ORDER — ALBUTEROL SULFATE 90 UG/1
2 AEROSOL, METERED RESPIRATORY (INHALATION) EVERY 6 HOURS PRN
Qty: 18 G | Refills: 3 | Status: SHIPPED | OUTPATIENT
Start: 2023-06-08 | End: 2023-06-12

## 2023-06-08 RX ADMIN — ALBUTEROL SULFATE 2.5 MG: 0.83 SOLUTION RESPIRATORY (INHALATION) at 11:06

## 2023-06-08 NOTE — PROGRESS NOTES
Cardiology    6/15/2023  10:51 AM    Problem list  Patient Active Problem List   Diagnosis    HTN (hypertension)    Hypercholesteremia    CAD (coronary artery disease)    Pulmonary nodule    Urine retention    Hx of myocardial infarction    Enlarged prostate    History of bladder cancer    Abdominal aortic aneurysm (AAA) without rupture    PVD (peripheral vascular disease)    Carotid stenosis, asymptomatic, bilateral    Hemoptysis    Centrilobular emphysema    Tobacco user    S/P PTCA (percutaneous transluminal coronary angioplasty)    Aortic arch atherosclerosis    History of endovascular stent graft for abdominal aortic aneurysm (AAA)    RBBB    Dyspnea on exertion    Localized edema    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  Having significant Velasco today and coughing up a thick purulent appearing sputum.  No chest pain    Medications  Current Outpatient Medications   Medication Sig Dispense Refill    ascorbic acid, vitamin C, (VITAMIN C) 500 MG tablet Take 500 mg by mouth once daily.      aspirin (ECOTRIN) 81 MG EC tablet Take 81 mg by mouth once daily.      betamethasone dipropionate 0.05 % cream Apply topically 2 (two) times daily. 45 g 1    clotrimazole-betamethasone 1-0.05% (LOTRISONE) cream Apply topically 2 (two) times daily. 1 each 6    dutasteride (AVODART) 0.5 mg capsule       ergocalciferol (VITAMIN D2) 50,000 unit Cap TAKE 1 CAPSULE BY MOUTH  EVERY 30 days 12 capsule 0    finasteride (PROSCAR) 5 mg tablet Take 1 tablet (5 mg total) by mouth once daily. 90 tablet 0    levothyroxine (SYNTHROID) 25 MCG tablet TAKE 1 TABLET BY MOUTH BEFORE  BREAKFAST TAKEN IN THE MORNING  ON EMPTY STOMACH AND NOT TO EAT  AT LEAST FOR 1/2 HOUR AFTER 90 tablet 0    potassium chloride (KLOR-CON) 10 MEQ TbSR Take 1 tablet (10 mEq  total) by mouth once daily. 90 tablet 0    tamsulosin (FLOMAX) 0.4 mg Cap Take 1 capsule (0.4 mg total) by mouth once daily. 90 capsule 0    vit A/vit C/vit E/zinc/copper (ICAPS AREDS ORAL) Take by mouth 2 (two) times a day.      albuterol (PROAIR HFA) 90 mcg/actuation inhaler Inhale 2 puffs into the lungs every 4 (four) hours as needed for Wheezing or Shortness of Breath (cough). Rescue 18 g 3    atenoloL (TENORMIN) 25 MG tablet Take 1 tablet (25 mg total) by mouth once daily. 90 tablet 3    atorvastatin (LIPITOR) 40 MG tablet Take 1 tablet (40 mg total) by mouth once daily. 90 tablet 3    clopidogreL (PLAVIX) 75 mg tablet Take 1 tablet (75 mg total) by mouth once daily. 90 tablet 3    predniSONE (DELTASONE) 20 MG tablet Take 1 tablet (20 mg total) by mouth 2 (two) times daily. 10 tablet 0     No current facility-administered medications for this visit.      Prior to Admission medications    Medication Sig Start Date End Date Taking? Authorizing Provider   ascorbic acid, vitamin C, (VITAMIN C) 500 MG tablet Take 500 mg by mouth once daily.   Yes Historical Provider   aspirin (ECOTRIN) 81 MG EC tablet Take 81 mg by mouth once daily.   Yes Historical Provider   betamethasone dipropionate 0.05 % cream Apply topically 2 (two) times daily. 4/26/23 6/8/23 Yes Poncho Guevara MD   clotrimazole-betamethasone 1-0.05% (LOTRISONE) cream Apply topically 2 (two) times daily. 2/2/23  Yes Concepcion Leon NP   dutasteride (AVODART) 0.5 mg capsule  4/20/23  Yes Historical Provider   ergocalciferol (VITAMIN D2) 50,000 unit Cap TAKE 1 CAPSULE BY MOUTH  EVERY 30 days 2/15/23  Yes Emelia Snyder MD   finasteride (PROSCAR) 5 mg tablet Take 1 tablet (5 mg total) by mouth once daily. 4/25/23  Yes Emelia Snyder MD   levothyroxine (SYNTHROID) 25 MCG tablet TAKE 1 TABLET BY MOUTH BEFORE  BREAKFAST TAKEN IN THE MORNING  ON EMPTY STOMACH AND NOT TO EAT  AT LEAST FOR 1/2 HOUR AFTER 4/25/23  Yes Emelia Snyder MD   potassium  chloride (KLOR-CON) 10 MEQ TbSR Take 1 tablet (10 mEq total) by mouth once daily. 4/25/23  Yes Emelia Snyder MD   promethazine-dextromethorphan (PROMETHAZINE-DM) 6.25-15 mg/5 mL Syrp Take 5 mLs by mouth every 6 (six) hours as needed (cough). 6/5/23 6/10/23 Yes Romeo Davalos MD   tamsulosin (FLOMAX) 0.4 mg Cap Take 1 capsule (0.4 mg total) by mouth once daily. 4/25/23  Yes Emelia Snyder MD   vit A/vit C/vit E/zinc/copper (ICAPS AREDS ORAL) Take by mouth 2 (two) times a day.   Yes Historical Provider   atenoloL (TENORMIN) 25 MG tablet Take 1 tablet (25 mg total) by mouth once daily. 4/25/23 6/8/23 Yes Emelia Snyder MD   atorvastatin (LIPITOR) 40 MG tablet Take 1 tablet (40 mg total) by mouth once daily. 4/25/23 6/8/23 Yes Emelia Snyder MD   clopidogreL (PLAVIX) 75 mg tablet Take 1 tablet (75 mg total) by mouth once daily. 4/25/23 6/8/23 Yes Emelia Snyder MD   albuterol (VENTOLIN HFA) 90 mcg/actuation inhaler Inhale 2 puffs into the lungs every 6 (six) hours as needed for Wheezing. Rescue 6/8/23   Tana Luciano DNP   atenoloL (TENORMIN) 25 MG tablet Take 1 tablet (25 mg total) by mouth once daily. 6/8/23   Tana Luciano DNP   atorvastatin (LIPITOR) 40 MG tablet Take 1 tablet (40 mg total) by mouth once daily. 6/8/23   Tana Luciano DNP   azithromycin (Z-JOSÉ MANUEL) 250 MG tablet Take 2 tablets by mouth on day 1; Take 1 tablet by mouth on days 2-5 6/8/23 6/13/23  Tana Luciano DNP   clopidogreL (PLAVIX) 75 mg tablet Take 1 tablet (75 mg total) by mouth once daily. 6/8/23   Tana Luciano DNP   predniSONE (DELTASONE) 20 MG tablet Take 1 tablet (20 mg total) by mouth 2 (two) times daily. 6/8/23   Tana Lucaino DNP   albuterol (VENTOLIN HFA) 90 mcg/actuation inhaler Inhale 1-2 puffs into the lungs every 4 to 6 hours as needed for Wheezing or Shortness of Breath (cough). Rescue 6/8/23 6/8/23  Tana Luciano DNP   azithromycin (Z-JOSÉ MANUEL) 250 MG tablet Take 2 tablets by mouth on day 1; Take 1  tablet by mouth on days 2-5 6/8/23 6/8/23  Tana Luciano DNP   predniSONE (DELTASONE) 20 MG tablet Take 1 tablet (20 mg total) by mouth 2 (two) times daily. 6/8/23 6/8/23  Tana Luciano DNP         History  Past Medical History:   Diagnosis Date    AAA (abdominal aortic aneurysm) without rupture     pt with h/o aaa     Acute coronary syndrome     Cancer     bladder    Carotid artery occlusion     COPD (chronic obstructive pulmonary disease)     Coronary artery disease     Enlarged prostate     History of bladder cancer     Hyperlipidemia     Hypertension     Lung abnormality     spot on lung under care of pulmonologist at [facility] Dr [facility]    Multiple thyroid nodules 1/31/2023    Myocardial infarct 1988    Urine retention      Past Surgical History:   Procedure Laterality Date    ABDOMINAL AORTIC ANEURYSM REPAIR  2007    BLADDER SURGERY      CARDIAC CATHETERIZATION      CORONARY ANGIOPLASTY      CORONARY STENT PLACEMENT      FRACTURE SURGERY      arm    PROSTATE SURGERY      ROBOTIC BRONCHOSCOPY N/A 5/31/2022    Procedure: ROBOTIC BRONCHOSCOPY;  Surgeon: Mickey Acosta MD;  Location: [facility] OR [address] [place] [address];  Service: Pulmonary;  Laterality: N/A;    TONSILLECTOMY       Social History     Socioeconomic History    Marital status:    Occupational History    Occupation: retired    Tobacco Use    Smoking status: Every Day     Packs/day: 0.50     Years: 60.00     Pack years: 30.00     Types: Cigarettes    Smokeless tobacco: Never   Substance and Sexual Activity    Alcohol use: Yes     Alcohol/week: 1.0 standard drink     Types: 1 Shots of liquor per week     Comment:  five drink per week     Drug use: No    Sexual activity: Yes     Partners: Female     Birth control/protection: None         Allergies  Review of patient's allergies indicates:  No Known Allergies      Review of Systems   Review of Systems   Constitutional: Positive for malaise/fatigue. Negative for diaphoresis.   HENT: Negative.      Cardiovascular:  Positive for dyspnea on exertion. Negative for chest pain, claudication, irregular heartbeat, leg swelling, near-syncope, orthopnea, palpitations, paroxysmal nocturnal dyspnea and syncope.   Respiratory:  Positive for cough, shortness of breath, sputum production and wheezing.    Endocrine: Negative for polydipsia, polyphagia and polyuria.   Hematologic/Lymphatic: Does not bruise/bleed easily.   Gastrointestinal:  Negative for bloating, nausea and vomiting.   Genitourinary: Negative.    Neurological:  Negative for excessive daytime sleepiness, dizziness, light-headedness, loss of balance and weakness.   Psychiatric/Behavioral:  The patient is not nervous/anxious.    Allergic/Immunologic: Negative.        Physical Exam  Wt Readings from Last 1 Encounters:   06/08/23 59.7 kg (131 lb 9.8 oz)     BP Readings from Last 3 Encounters:   06/08/23 102/60   06/05/23 (!) 146/72   04/26/23 139/78     Pulse Readings from Last 1 Encounters:   06/08/23 70     Body mass index is 18.88 kg/m².    Physical Exam  Vitals and nursing note reviewed.   Constitutional:       Appearance: Normal appearance.   HENT:      Head: Normocephalic and atraumatic.      Mouth/Throat:      Mouth: Mucous membranes are moist.   Eyes:      Pupils: Pupils are equal, round, and reactive to light.   Cardiovascular:      Rate and Rhythm: Normal rate and regular rhythm.      Pulses:           Radial pulses are 2+ on the right side and 2+ on the left side.        Dorsalis pedis pulses are 2+ on the right side and 2+ on the left side.        Posterior tibial pulses are 2+ on the right side and 2+ on the left side.      Heart sounds: No murmur heard.  Pulmonary:      Effort: Pulmonary effort is normal. No respiratory distress.      Breath sounds: Wheezing and rales present.   Abdominal:      General: There is no distension.      Tenderness: There is no abdominal tenderness.   Musculoskeletal:      Cervical back: Normal range of motion.       Right lower leg: No edema.      Left lower leg: No edema.   Skin:     General: Skin is warm and dry.      Findings: No erythema.   Neurological:      General: No focal deficit present.      Mental Status: He is alert.   Psychiatric:         Mood and Affect: Mood normal.         Behavior: Behavior normal.           Problem List Items Addressed This Visit          Cardiac/Vascular    HTN (hypertension) (Chronic)    Overview     Home log reviewed  Well controlled  Continue meds as now         Current Assessment & Plan     As above         Relevant Medications    atenoloL (TENORMIN) 25 MG tablet    Hx of myocardial infarction    Relevant Medications    clopidogreL (PLAVIX) 75 mg tablet    Dyspnea on exertion    Overview     Suspect COPD exacerbation  Responded well to neb treatment in clinic  Will give steroid burst and Z rashel PRN SHOSHANA  Recommend quitting smoking  Needs ICS/LABA/LAMA         Current Assessment & Plan     As above          Other Visit Diagnoses       Essential hypertension  (Chronic)       Relevant Medications    atenoloL (TENORMIN) 25 MG tablet    Mixed hyperlipidemia        Relevant Medications    atorvastatin (LIPITOR) 40 MG tablet              Follow Up  2 weeks      @Tana Luciano DNP

## 2023-06-08 NOTE — PATIENT INSTRUCTIONS
I would recommend taking Zyrtec daily    Flonase nasal spray 2 sprays each nostril once daily can help     I am sending over an albuterol inhaler 1-2 puffs every 4-6 hours for cough     We will send a steroid burst of 20 mg twice daily for 5 days and azithromycin for 5 days.  While you are taking the azithromycin hold off on taking the atorvastatin (you will hold 5 doses and resume when you finish the antibiotic)     I have filled your other meds and sent to [facility]    Contact Dr. Snyder about long term treatment for your obstructive lung disease    Please call us next week to let us know how you are doing

## 2023-06-09 ENCOUNTER — TELEPHONE (OUTPATIENT)
Dept: CARDIOLOGY | Facility: CLINIC | Age: 83
End: 2023-06-09
Payer: MEDICARE

## 2023-06-09 NOTE — TELEPHONE ENCOUNTER
Ventolin HFA 90 MCG base is not covered.  Alternatives include Albuterol HFA 90 MCG/ACT(Proair), Albuterol HFA 90 MCG/ACT (Proventil), Levalbuterol HFA 45 MCG/ACT.  90 day supply is preferred.  Fax received from [facility].

## 2023-06-12 NOTE — TELEPHONE ENCOUNTER
Spoke with patient, Albuterol received from his local pharmacy and will get Rx for inhaler from [facility] also.

## 2023-07-07 ENCOUNTER — TELEPHONE (OUTPATIENT)
Dept: UROLOGY | Facility: CLINIC | Age: 83
End: 2023-07-07
Payer: MEDICARE

## 2023-07-07 NOTE — TELEPHONE ENCOUNTER
----- Message from Stephon Hendrickson sent at 7/7/2023  3:12 PM CDT -----  Consult/Advisory  Martell   Name Of Caller:      Contact Preference:[phone]    Nature of call: PT CALLED REGARDING RX THAT HE NEEDS FROM HIS NEURO

## 2023-07-07 NOTE — TELEPHONE ENCOUNTER
Group Topic: BH Process Group     Date: 6/23/2021  Start Time:  2:00 PM  End Time:  3:00 PM  Facilitators: Harika Combs    Focus: healthy ways to deal with Stress  Number in attendance: 9      Method: Group  Attendance: Present  Participation: Active  Patient Response: Appropriate feedback  Mood: Anxious  Affect: Type: Anxious   Range: Full (normal)   Congruency: Congruent   Stability: Stable  Behavior/Socialization: Cooperative  Thought Process: Focused  Task Performance: Follows directions  Patient Evaluation: Independent - full participation This visit was performed via live interactive two-way video.    Clinician Location: [facility]    Patient Location: Home  Patient verbally consented to video visit.          No answer. Message left to call clinic back.

## 2023-07-10 ENCOUNTER — TELEPHONE (OUTPATIENT)
Dept: UROLOGY | Facility: CLINIC | Age: 83
End: 2023-07-10
Payer: MEDICARE

## 2023-07-10 NOTE — TELEPHONE ENCOUNTER
----- Message from Kylah Espinal sent at 7/10/2023  8:51 AM CDT -----  Regarding: returning call from 7/7  Contact: [phone]  Pt requesting call back RE: returning call to staff      Confirmed contact below:  Contact Name:Martell Whitaker  Phone Number: [phone]

## 2023-07-11 NOTE — TELEPHONE ENCOUNTER
----- Message from Noy Quiñonez sent at 7/11/2023  7:58 AM CDT -----  Regarding: pt advice  Contact: [phone]  Martell Whitaker calling regarding Patient Advice (message) for #requesting a call back and he didn't states why. Please call

## 2023-07-14 DIAGNOSIS — I10 PRIMARY HYPERTENSION: ICD-10-CM

## 2023-07-14 DIAGNOSIS — R33.9 URINE RETENTION: Chronic | ICD-10-CM

## 2023-07-14 RX ORDER — DUTASTERIDE 0.5 MG/1
0.5 CAPSULE, LIQUID FILLED ORAL DAILY
Qty: 90 CAPSULE | Refills: 3 | Status: SHIPPED | OUTPATIENT
Start: 2023-07-14 | End: 2024-01-02

## 2023-07-21 ENCOUNTER — OFFICE VISIT (OUTPATIENT)
Dept: UROLOGY | Facility: CLINIC | Age: 83
End: 2023-07-21
Payer: MEDICARE

## 2023-07-21 VITALS
HEIGHT: 70 IN | WEIGHT: 133.94 LBS | HEART RATE: 55 BPM | BODY MASS INDEX: 19.17 KG/M2 | SYSTOLIC BLOOD PRESSURE: 127 MMHG | DIASTOLIC BLOOD PRESSURE: 77 MMHG

## 2023-07-21 DIAGNOSIS — N47.1 PHIMOSIS: Primary | ICD-10-CM

## 2023-07-21 PROCEDURE — 1126F AMNT PAIN NOTED NONE PRSNT: CPT | Mod: CPTII,S$GLB,, | Performed by: UROLOGY

## 2023-07-21 PROCEDURE — 3078F PR MOST RECENT DIASTOLIC BLOOD PRESSURE < 80 MM HG: ICD-10-PCS | Mod: CPTII,S$GLB,, | Performed by: UROLOGY

## 2023-07-21 PROCEDURE — 3074F PR MOST RECENT SYSTOLIC BLOOD PRESSURE < 130 MM HG: ICD-10-PCS | Mod: CPTII,S$GLB,, | Performed by: UROLOGY

## 2023-07-21 PROCEDURE — 99999 PR PBB SHADOW E&M-EST. PATIENT-LVL IV: CPT | Mod: PBBFAC,,, | Performed by: UROLOGY

## 2023-07-21 PROCEDURE — 1101F PT FALLS ASSESS-DOCD LE1/YR: CPT | Mod: CPTII,S$GLB,, | Performed by: UROLOGY

## 2023-07-21 PROCEDURE — 3288F PR FALLS RISK ASSESSMENT DOCUMENTED: ICD-10-PCS | Mod: CPTII,S$GLB,, | Performed by: UROLOGY

## 2023-07-21 PROCEDURE — 3078F DIAST BP <80 MM HG: CPT | Mod: CPTII,S$GLB,, | Performed by: UROLOGY

## 2023-07-21 PROCEDURE — 1157F ADVNC CARE PLAN IN RCRD: CPT | Mod: CPTII,S$GLB,, | Performed by: UROLOGY

## 2023-07-21 PROCEDURE — 1157F PR ADVANCE CARE PLAN OR EQUIV PRESENT IN MEDICAL RECORD: ICD-10-PCS | Mod: CPTII,S$GLB,, | Performed by: UROLOGY

## 2023-07-21 PROCEDURE — 1160F PR REVIEW ALL MEDS BY PRESCRIBER/CLIN PHARMACIST DOCUMENTED: ICD-10-PCS | Mod: CPTII,S$GLB,, | Performed by: UROLOGY

## 2023-07-21 PROCEDURE — 3288F FALL RISK ASSESSMENT DOCD: CPT | Mod: CPTII,S$GLB,, | Performed by: UROLOGY

## 2023-07-21 PROCEDURE — 3074F SYST BP LT 130 MM HG: CPT | Mod: CPTII,S$GLB,, | Performed by: UROLOGY

## 2023-07-21 PROCEDURE — 99999 PR PBB SHADOW E&M-EST. PATIENT-LVL IV: ICD-10-PCS | Mod: PBBFAC,,, | Performed by: UROLOGY

## 2023-07-21 PROCEDURE — 99214 PR OFFICE/OUTPT VISIT, EST, LEVL IV, 30-39 MIN: ICD-10-PCS | Mod: S$GLB,,, | Performed by: UROLOGY

## 2023-07-21 PROCEDURE — 99214 OFFICE O/P EST MOD 30 MIN: CPT | Mod: S$GLB,,, | Performed by: UROLOGY

## 2023-07-21 PROCEDURE — 1159F PR MEDICATION LIST DOCUMENTED IN MEDICAL RECORD: ICD-10-PCS | Mod: CPTII,S$GLB,, | Performed by: UROLOGY

## 2023-07-21 PROCEDURE — 1159F MED LIST DOCD IN RCRD: CPT | Mod: CPTII,S$GLB,, | Performed by: UROLOGY

## 2023-07-21 PROCEDURE — 1126F PR PAIN SEVERITY QUANTIFIED, NO PAIN PRESENT: ICD-10-PCS | Mod: CPTII,S$GLB,, | Performed by: UROLOGY

## 2023-07-21 PROCEDURE — 1160F RVW MEDS BY RX/DR IN RCRD: CPT | Mod: CPTII,S$GLB,, | Performed by: UROLOGY

## 2023-07-21 PROCEDURE — 1101F PR PT FALLS ASSESS DOC 0-1 FALLS W/OUT INJ PAST YR: ICD-10-PCS | Mod: CPTII,S$GLB,, | Performed by: UROLOGY

## 2023-07-21 NOTE — PROGRESS NOTES
"Subjective:      Martell Whitaker is a 82 y.o. male who returns today regarding his phimosis and balanitis.    Reports no relief w/ topical steroid. Again notes no bother from phimosis.    He is on plavix for CAD. He also has h/o AAA with stent, h/o MI, emphysema, and lung CA.    The following portions of the patient's history were reviewed and updated as appropriate: allergies, current medications, past family history, past medical history, past social history, past surgical history and problem list.    Review of Systems  A comprehensive multipoint review of systems was negative except as otherwise stated in the HPI.     Objective:   Vitals: /77 (BP Location: Left arm, Patient Position: Sitting, BP Method: Medium (Automatic))   Pulse (!) 55   Ht 5' 10" (1.778 m)   Wt 60.7 kg (133 lb 14.9 oz)   BMI 19.22 kg/m²     Physical Exam   General: alert and oriented, no acute distress  Respiratory: Symmetric expansion, non-labored breathing  Genitourinary: uncircumcised with severe phimosis  Neuro: no gross deficits  Psych: normal judgment and insight, normal mood/affect, and non-anxious    Lab Review     Lab Results   Component Value Date    WBC 11.91 06/05/2023    HGB 15.3 06/05/2023    HCT 47.9 06/05/2023     (H) 06/05/2023     (L) 06/05/2023     Lab Results   Component Value Date    CREATININE 1.0 06/05/2023    BUN 16 06/05/2023     Lab Results   Component Value Date    PSA 1.4 03/04/2019    PSADIAG 1.22 12/20/2022     Assessment and Plan:   1. Phimosis  -- DC betamethasone  -- Discussed circumcision, but will defer due to high anesthetic risk and lack of bother for patient  -- Will monitor with exam every 6-12 mos given risk of penile CA         "

## 2023-07-28 ENCOUNTER — TELEPHONE (OUTPATIENT)
Dept: UROLOGY | Facility: CLINIC | Age: 83
End: 2023-07-28
Payer: MEDICARE

## 2023-07-28 DIAGNOSIS — R33.9 URINE RETENTION: Chronic | ICD-10-CM

## 2023-07-28 RX ORDER — TAMSULOSIN HYDROCHLORIDE 0.4 MG/1
1 CAPSULE ORAL DAILY
Qty: 90 CAPSULE | Refills: 3 | Status: SHIPPED | OUTPATIENT
Start: 2023-07-28 | End: 2024-07-27

## 2023-07-28 NOTE — TELEPHONE ENCOUNTER
----- Message from Enedina Mack LPN sent at 7/28/2023 11:28 AM CDT -----  Regarding: FW: med refill    ----- Message -----  From: Carol Wynn  Sent: 7/28/2023   9:02 AM CDT  To: Paola Isaac Staff  Subject: med refill                                       The patient called requesting refill of   tamsulosin (FLOMAX) 0.4 mg Cap  Please send to pharmacy on file    No further information provided      Patient can be contacted @# [phone] (home)

## 2023-08-29 ENCOUNTER — TELEPHONE (OUTPATIENT)
Dept: CARDIOLOGY | Facility: CLINIC | Age: 83
End: 2023-08-29
Payer: MEDICARE

## 2023-08-29 NOTE — TELEPHONE ENCOUNTER
Spoke with patient.  Advised patient that MORENA Luciano did not order any labs at his last visit.  He does have lab orders in the system for Dr Snyder.   States he will have the labs for Dr Snyder next month.    ----- Message from Kylah Espinal sent at 8/29/2023 10:19 AM CDT -----  Regarding: lab orders  Contact: pt  Pt requesting call back RE: pt states he has lab orders at [facility] and would like to know if they were ordered by provider if so, he would like the orders to be sent to [facility] instead of [facility]    Confirmed contact below:  Contact Name:Martell Whitaker  Phone Number: [phone]

## 2023-09-25 ENCOUNTER — TELEPHONE (OUTPATIENT)
Dept: UROLOGY | Facility: CLINIC | Age: 83
End: 2023-09-25
Payer: MEDICARE

## 2023-09-25 NOTE — TELEPHONE ENCOUNTER
Pt called stating someone called and he was unsure what is was about. There are no telephone encounter for this pt. I will check with staff tomorrow to see if they called.

## 2023-09-26 ENCOUNTER — TELEPHONE (OUTPATIENT)
Dept: UROLOGY | Facility: CLINIC | Age: 83
End: 2023-09-26
Payer: MEDICARE

## 2023-09-26 ENCOUNTER — OFFICE VISIT (OUTPATIENT)
Dept: CARDIOLOGY | Facility: CLINIC | Age: 83
End: 2023-09-26
Payer: MEDICARE

## 2023-09-26 VITALS
WEIGHT: 132.69 LBS | DIASTOLIC BLOOD PRESSURE: 60 MMHG | HEART RATE: 55 BPM | HEIGHT: 70 IN | SYSTOLIC BLOOD PRESSURE: 109 MMHG | OXYGEN SATURATION: 96 % | BODY MASS INDEX: 19 KG/M2

## 2023-09-26 DIAGNOSIS — I25.10 CORONARY ARTERY DISEASE INVOLVING NATIVE CORONARY ARTERY OF NATIVE HEART WITHOUT ANGINA PECTORIS: Primary | Chronic | ICD-10-CM

## 2023-09-26 DIAGNOSIS — R06.09 DYSPNEA ON EXERTION: ICD-10-CM

## 2023-09-26 DIAGNOSIS — Z98.61 S/P PTCA (PERCUTANEOUS TRANSLUMINAL CORONARY ANGIOPLASTY): ICD-10-CM

## 2023-09-26 PROCEDURE — 3074F PR MOST RECENT SYSTOLIC BLOOD PRESSURE < 130 MM HG: ICD-10-PCS | Mod: CPTII,S$GLB,, | Performed by: NURSE PRACTITIONER

## 2023-09-26 PROCEDURE — 99999 PR PBB SHADOW E&M-EST. PATIENT-LVL IV: CPT | Mod: PBBFAC,,, | Performed by: NURSE PRACTITIONER

## 2023-09-26 PROCEDURE — 3074F SYST BP LT 130 MM HG: CPT | Mod: CPTII,S$GLB,, | Performed by: NURSE PRACTITIONER

## 2023-09-26 PROCEDURE — 1157F PR ADVANCE CARE PLAN OR EQUIV PRESENT IN MEDICAL RECORD: ICD-10-PCS | Mod: CPTII,S$GLB,, | Performed by: NURSE PRACTITIONER

## 2023-09-26 PROCEDURE — 3288F PR FALLS RISK ASSESSMENT DOCUMENTED: ICD-10-PCS | Mod: CPTII,S$GLB,, | Performed by: NURSE PRACTITIONER

## 2023-09-26 PROCEDURE — 3078F PR MOST RECENT DIASTOLIC BLOOD PRESSURE < 80 MM HG: ICD-10-PCS | Mod: CPTII,S$GLB,, | Performed by: NURSE PRACTITIONER

## 2023-09-26 PROCEDURE — 99213 OFFICE O/P EST LOW 20 MIN: CPT | Mod: S$GLB,,, | Performed by: NURSE PRACTITIONER

## 2023-09-26 PROCEDURE — 1101F PR PT FALLS ASSESS DOC 0-1 FALLS W/OUT INJ PAST YR: ICD-10-PCS | Mod: CPTII,S$GLB,, | Performed by: NURSE PRACTITIONER

## 2023-09-26 PROCEDURE — 1160F RVW MEDS BY RX/DR IN RCRD: CPT | Mod: CPTII,S$GLB,, | Performed by: NURSE PRACTITIONER

## 2023-09-26 PROCEDURE — 3078F DIAST BP <80 MM HG: CPT | Mod: CPTII,S$GLB,, | Performed by: NURSE PRACTITIONER

## 2023-09-26 PROCEDURE — 1160F PR REVIEW ALL MEDS BY PRESCRIBER/CLIN PHARMACIST DOCUMENTED: ICD-10-PCS | Mod: CPTII,S$GLB,, | Performed by: NURSE PRACTITIONER

## 2023-09-26 PROCEDURE — 3288F FALL RISK ASSESSMENT DOCD: CPT | Mod: CPTII,S$GLB,, | Performed by: NURSE PRACTITIONER

## 2023-09-26 PROCEDURE — 1159F MED LIST DOCD IN RCRD: CPT | Mod: CPTII,S$GLB,, | Performed by: NURSE PRACTITIONER

## 2023-09-26 PROCEDURE — 1126F AMNT PAIN NOTED NONE PRSNT: CPT | Mod: CPTII,S$GLB,, | Performed by: NURSE PRACTITIONER

## 2023-09-26 PROCEDURE — 99999 PR PBB SHADOW E&M-EST. PATIENT-LVL IV: ICD-10-PCS | Mod: PBBFAC,,, | Performed by: NURSE PRACTITIONER

## 2023-09-26 PROCEDURE — 99213 PR OFFICE/OUTPT VISIT, EST, LEVL III, 20-29 MIN: ICD-10-PCS | Mod: S$GLB,,, | Performed by: NURSE PRACTITIONER

## 2023-09-26 PROCEDURE — 1101F PT FALLS ASSESS-DOCD LE1/YR: CPT | Mod: CPTII,S$GLB,, | Performed by: NURSE PRACTITIONER

## 2023-09-26 PROCEDURE — 1159F PR MEDICATION LIST DOCUMENTED IN MEDICAL RECORD: ICD-10-PCS | Mod: CPTII,S$GLB,, | Performed by: NURSE PRACTITIONER

## 2023-09-26 PROCEDURE — 1126F PR PAIN SEVERITY QUANTIFIED, NO PAIN PRESENT: ICD-10-PCS | Mod: CPTII,S$GLB,, | Performed by: NURSE PRACTITIONER

## 2023-09-26 PROCEDURE — 1157F ADVNC CARE PLAN IN RCRD: CPT | Mod: CPTII,S$GLB,, | Performed by: NURSE PRACTITIONER

## 2023-09-26 RX ORDER — FINASTERIDE 5 MG/1
5 TABLET, FILM COATED ORAL DAILY
Qty: 90 TABLET | Refills: 3 | Status: SHIPPED | OUTPATIENT
Start: 2023-09-26 | End: 2024-09-25

## 2023-09-26 NOTE — PROGRESS NOTES
Cardiology    9/26/2023  10:20 AM    Problem list  Patient Active Problem List   Diagnosis    HTN (hypertension)    Hypercholesteremia    CAD (coronary artery disease)    Pulmonary nodule    Urine retention    Hx of myocardial infarction    Enlarged prostate    History of bladder cancer    Abdominal aortic aneurysm (AAA) without rupture    PVD (peripheral vascular disease)    Carotid stenosis, asymptomatic, bilateral    Hemoptysis    Centrilobular emphysema    Tobacco user    S/P PTCA (percutaneous transluminal coronary angioplasty)    Aortic arch atherosclerosis    History of endovascular stent graft for abdominal aortic aneurysm (AAA)    RBBB    Dyspnea on exertion    Localized edema    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  Still has a bad cough- taking mucinex and is going to see. Not happening at rest, sometimes a little trouble catching his breath. Sleeps on 2 pillows, normal for him. Has lost some weight- seeing PCP next week and has lots of questions about vaccines and supplements.  No chest pain.  His grandaughter has power of  for him and thsi was just updated.  Sees lung cancer MD in coming weeks. No night sweats.     Medications  Current Outpatient Medications   Medication Sig Dispense Refill    albuterol (PROVENTIL/VENTOLIN HFA) 90 mcg/actuation inhaler INHALE 2 INHALATIONS BY MOUTH  INTO THE LUNGS EVERY 4 HOURS AS  NEEDED FOR WHEEZING OR SHORTNESS OF BREATH 17 g 9    ascorbic acid, vitamin C, (VITAMIN C) 500 MG tablet Take 500 mg by mouth once daily.      aspirin (ECOTRIN) 81 MG EC tablet Take 81 mg by mouth once daily.      atenoloL (TENORMIN) 25 MG tablet Take 1 tablet (25 mg total) by mouth once daily. 90 tablet 3    atorvastatin (LIPITOR) 40 MG tablet Take 1 tablet (40 mg total) by mouth once  daily. 90 tablet 3    clopidogreL (PLAVIX) 75 mg tablet Take 1 tablet (75 mg total) by mouth once daily. 90 tablet 3    dutasteride (AVODART) 0.5 mg capsule Take 1 capsule (0.5 mg total) by mouth once daily. 90 capsule 3    ergocalciferol (VITAMIN D2) 50,000 unit Cap TAKE 1 CAPSULE BY MOUTH  EVERY 30 days 12 capsule 0    levothyroxine (SYNTHROID) 25 MCG tablet TAKE 1 TABLET BY MOUTH ONCE  DAILY BEFORE BREAKFAST ON EMPTY  STOMACH AND NOT TO EAT AT LEAST  FOR 1/2 HOUR AFTER 90 tablet 0    potassium chloride (KLOR-CON) 10 MEQ TbSR TAKE 1 TABLET BY MOUTH ONCE  DAILY 30 tablet 0    tamsulosin (FLOMAX) 0.4 mg Cap Take 1 capsule (0.4 mg total) by mouth once daily. 90 capsule 3    vit A/vit C/vit E/zinc/copper (ICAPS AREDS ORAL) Take by mouth 2 (two) times a day.      betamethasone dipropionate 0.05 % cream Apply topically 2 (two) times daily. (Patient not taking: Reported on 9/26/2023) 45 g 1    clotrimazole-betamethasone 1-0.05% (LOTRISONE) cream Apply topically 2 (two) times daily. (Patient not taking: Reported on 9/26/2023) 1 each 6    predniSONE (DELTASONE) 20 MG tablet Take 1 tablet (20 mg total) by mouth 2 (two) times daily. (Patient not taking: Reported on 9/26/2023) 10 tablet 0     No current facility-administered medications for this visit.      Prior to Admission medications    Medication Sig Start Date End Date Taking? Authorizing Provider   albuterol (PROVENTIL/VENTOLIN HFA) 90 mcg/actuation inhaler INHALE 2 INHALATIONS BY MOUTH  INTO THE LUNGS EVERY 4 HOURS AS  NEEDED FOR WHEEZING OR SHORTNESS OF BREATH 7/31/23  Yes Tana Luciano DNP   ascorbic acid, vitamin C, (VITAMIN C) 500 MG tablet Take 500 mg by mouth once daily.   Yes Provider, Historical   aspirin (ECOTRIN) 81 MG EC tablet Take 81 mg by mouth once daily.   Yes Provider, Historical   atenoloL (TENORMIN) 25 MG tablet Take 1 tablet (25 mg total) by mouth once daily. 6/8/23  Yes Craft, Tana P., DNP   atorvastatin (LIPITOR) 40 MG tablet Take 1 tablet  (40 mg total) by mouth once daily. 6/8/23  Yes Tana Luciano DNP   clopidogreL (PLAVIX) 75 mg tablet Take 1 tablet (75 mg total) by mouth once daily. 6/8/23  Yes Tana Luciano DNP   dutasteride (AVODART) 0.5 mg capsule Take 1 capsule (0.5 mg total) by mouth once daily. 7/14/23 7/13/24 Yes Natasha Valadez NP   ergocalciferol (VITAMIN D2) 50,000 unit Cap TAKE 1 CAPSULE BY MOUTH  EVERY 30 days 2/15/23  Yes Emelia Snyder MD   levothyroxine (SYNTHROID) 25 MCG tablet TAKE 1 TABLET BY MOUTH ONCE  DAILY BEFORE BREAKFAST ON EMPTY  STOMACH AND NOT TO EAT AT LEAST  FOR 1/2 HOUR AFTER 8/10/23  Yes Emelia Snyder MD   potassium chloride (KLOR-CON) 10 MEQ TbSR TAKE 1 TABLET BY MOUTH ONCE  DAILY 9/5/23  Yes Emelia Snyder MD   tamsulosin (FLOMAX) 0.4 mg Cap Take 1 capsule (0.4 mg total) by mouth once daily. 7/28/23 7/27/24 Yes Poncho Guevara MD   vit A/vit C/vit E/zinc/copper (ICAPS AREDS ORAL) Take by mouth 2 (two) times a day.   Yes Provider, Historical   betamethasone dipropionate 0.05 % cream Apply topically 2 (two) times daily.  Patient not taking: Reported on 9/26/2023 4/26/23 7/21/23  Poncho Guevara MD   clotrimazole-betamethasone 1-0.05% (LOTRISONE) cream Apply topically 2 (two) times daily.  Patient not taking: Reported on 9/26/2023 2/2/23   Concepcion Leon NP   predniSONE (DELTASONE) 20 MG tablet Take 1 tablet (20 mg total) by mouth 2 (two) times daily.  Patient not taking: Reported on 9/26/2023 6/8/23   Tana Luciano DNP         History  Past Medical History:   Diagnosis Date    AAA (abdominal aortic aneurysm) without rupture     pt with h/o aaa     Acute coronary syndrome     Cancer     bladder    Carotid artery occlusion     COPD (chronic obstructive pulmonary disease)     Coronary artery disease     Enlarged prostate     History of bladder cancer     Hyperlipidemia     Hypertension     Lung abnormality     spot on lung under care of pulmonologist at [facility] Dr lua     Multiple thyroid nodules 1/31/2023    Myocardial infarct 1988    Urine retention      Past Surgical History:   Procedure Laterality Date    ABDOMINAL AORTIC ANEURYSM REPAIR  2007    BLADDER SURGERY      CARDIAC CATHETERIZATION      CORONARY ANGIOPLASTY      CORONARY STENT PLACEMENT      FRACTURE SURGERY      arm    PROSTATE SURGERY      ROBOTIC BRONCHOSCOPY N/A 5/31/2022    Procedure: ROBOTIC BRONCHOSCOPY;  Surgeon: Mickey Acosta MD;  Location: [facility] OR [address] [place] [address];  Service: Pulmonary;  Laterality: N/A;    TONSILLECTOMY       Social History     Socioeconomic History    Marital status:    Occupational History    Occupation: retired    Tobacco Use    Smoking status: Every Day     Current packs/day: 0.50     Average packs/day: 0.5 packs/day for 60.0 years (30.0 ttl pk-yrs)     Types: Cigarettes    Smokeless tobacco: Never   Substance and Sexual Activity    Alcohol use: Yes     Alcohol/week: 1.0 standard drink of alcohol     Types: 1 Shots of liquor per week     Comment:  five drink per week     Drug use: No    Sexual activity: Yes     Partners: Female     Birth control/protection: None         Allergies  Review of patient's allergies indicates:  No Known Allergies      Review of Systems   Review of Systems   Constitutional: Negative for diaphoresis and malaise/fatigue.   HENT: Negative.     Cardiovascular:  Positive for dyspnea on exertion. Negative for chest pain, claudication, irregular heartbeat, leg swelling, near-syncope, orthopnea, palpitations, paroxysmal nocturnal dyspnea and syncope.   Respiratory:  Positive for cough and shortness of breath.    Endocrine: Negative for polydipsia, polyphagia and polyuria.   Hematologic/Lymphatic: Does not bruise/bleed easily.   Gastrointestinal:  Negative for bloating, nausea and vomiting.   Genitourinary: Negative.    Neurological:  Negative for excessive daytime sleepiness, dizziness, light-headedness, loss of balance and weakness.   Psychiatric/Behavioral:  The  patient is not nervous/anxious.    Allergic/Immunologic: Negative.          Physical Exam  Wt Readings from Last 1 Encounters:   09/26/23 60.2 kg (132 lb 11.5 oz)     BP Readings from Last 3 Encounters:   09/26/23 109/60   07/21/23 127/77   07/17/23 (!) 83/48     Pulse Readings from Last 1 Encounters:   09/26/23 (!) 55     Body mass index is 19.04 kg/m².    Physical Exam  Vitals and nursing note reviewed.   Constitutional:       Appearance: Normal appearance.   HENT:      Head: Normocephalic and atraumatic.      Mouth/Throat:      Mouth: Mucous membranes are moist.   Eyes:      Pupils: Pupils are equal, round, and reactive to light.   Cardiovascular:      Rate and Rhythm: Normal rate and regular rhythm.      Pulses:           Radial pulses are 2+ on the right side and 2+ on the left side.        Dorsalis pedis pulses are 2+ on the right side and 2+ on the left side.        Posterior tibial pulses are 2+ on the right side and 2+ on the left side.      Heart sounds: No murmur heard.  Pulmonary:      Effort: Pulmonary effort is normal. No respiratory distress.      Breath sounds: Wheezing and rhonchi present.   Abdominal:      General: There is no distension.      Tenderness: There is no abdominal tenderness.   Musculoskeletal:      Cervical back: Normal range of motion.      Right lower leg: No edema.      Left lower leg: No edema.   Skin:     General: Skin is warm and dry.      Findings: No erythema.   Neurological:      General: No focal deficit present.      Mental Status: He is alert.   Psychiatric:         Mood and Affect: Mood normal.         Behavior: Behavior normal.           Problem List Items Addressed This Visit          Cardiac/Vascular    CAD (coronary artery disease) - Primary (Chronic)    Overview     Continue ASA and statin         Current Assessment & Plan     As above         Relevant Orders    IN OFFICE EKG 12-LEAD (to Muse)    S/P PTCA (percutaneous transluminal coronary angioplasty)    Overview      In 1996  On ASA, BB, plavix and statin  Repeat EKG stable         Current Assessment & Plan     As above         Dyspnea on exertion    Overview       Recommend quitting smoking  May benefit from ICS/LABA/LAMA         Current Assessment & Plan     As above

## 2023-09-26 NOTE — PATIENT INSTRUCTIONS
Discuss the pneumonia and COVID shots with your PCP- I do recommend staying up to date with these vaccinations    Continue your current medications    We will see you in 3 months, sooner if you need us

## 2023-09-26 NOTE — TELEPHONE ENCOUNTER
----- Message from Kylah Espinal sent at 9/25/2023  2:41 PM CDT -----  Regarding: returning call  Contact: pt  Pt requesting call back RE: returning call to staff      Confirmed contact below:  Contact Name:Martell Sherman  Phone Number: [phone]       Alar Island Pedicle Flap Text: The defect edges were debeveled with a #15 scalpel blade. Given the location of the defect, shape of the defect and the proximity to the alar rim an island pedicle advancement flap was deemed most appropriate. Using a sterile surgical marker, an appropriate advancement flap was drawn incorporating the defect, outlining the appropriate donor tissue and placing the expected incisions within the nasal ala running parallel to the alar rim. The area thus outlined was incised with a #15 scalpel blade. The skin margins were undermined minimally to an appropriate distance in all directions around the primary defect and laterally outward around the island pedicle utilizing iris scissors.  There was minimal undermining beneath the pedicle flap. Following this, the designed flap was carried over into the primary defect and sutured into place.

## 2023-09-26 NOTE — TELEPHONE ENCOUNTER
----- Message from Oralia Guillen MA sent at 9/26/2023  8:50 AM CDT -----  Please Advise     Pt wants to know if you can change the dutasteride medication that he is currently taking to finasteride.     Thanks, Oralia

## 2023-10-26 NOTE — PROGRESS NOTES
10/27/2023    Radiation Oncology Follow-Up Visit      Prior Radiation History:    Site  Technique  Energy  Dose/Fx (Gy)  #Fx  Total Dose (Gy)  Start Date  End Date  Elapsed Days    RUL Lung  SBRT  6X  10  5 / 5  50  6/28/2022 7/5/2022  7        Is the patient female between ages 15-55:  no    Does the patient have a CIED:  no      Assessment   This is an 83 y.o. male with Stage IA2 (cT1b cN0 M0) RUL NSCLC (adeno) diagnosed on robotic bronch w/ EBUS 5/31/22. PET/CT 5/9/22 demonstrated uptake in RUL primary only. He completed definitive SBRT 50 Gy in 5 fx on 7/5/22.    No significant late toxicity from treatment. CT Chest today 10/27/23 demonstrates some increased fibrosis in the RUL apex surrounding the treated primary. No evidence of new disease by my review.          Plan   1) I will see him back in 6 months with CT Chest prior for re-staging.          CHIEF COMPLAINT: F/U after lung SBRT    HPI: No major medical changes since last visit. He denies any worsening dyspnea. No chest pain. Ambulating well.       Past Medical History:   Diagnosis Date    AAA (abdominal aortic aneurysm) without rupture     pt with h/o aaa     Acute coronary syndrome     Cancer     bladder    Carotid artery occlusion     COPD (chronic obstructive pulmonary disease)     Coronary artery disease     Enlarged prostate     History of bladder cancer     Hyperlipidemia     Hypertension     Lung abnormality     spot on lung under care of pulmonologist at [facility] Dr lua    Multiple thyroid nodules 1/31/2023    Myocardial infarct 1988    Urine retention        Past Surgical History:   Procedure Laterality Date    ABDOMINAL AORTIC ANEURYSM REPAIR  2007    BLADDER SURGERY      CARDIAC CATHETERIZATION      CORONARY ANGIOPLASTY      CORONARY STENT PLACEMENT      FRACTURE SURGERY      arm    PROSTATE SURGERY      ROBOTIC BRONCHOSCOPY N/A 5/31/2022    Procedure: ROBOTIC BRONCHOSCOPY;  Surgeon: Mickey Acosta MD;  Location: [facility] OR [address] [place] [address];   Service: Pulmonary;  Laterality: N/A;    TONSILLECTOMY         Social History     Tobacco Use    Smoking status: Every Day     Current packs/day: 0.50     Average packs/day: 0.5 packs/day for 60.0 years (30.0 ttl pk-yrs)     Types: Cigarettes    Smokeless tobacco: Never   Substance Use Topics    Alcohol use: Yes     Alcohol/week: 1.0 standard drink of alcohol     Types: 1 Shots of liquor per week     Comment:  five drink per week     Drug use: No       Cancer-related family history includes Cancer in his father.    Current Outpatient Medications on File Prior to Visit   Medication Sig Dispense Refill    albuterol (PROVENTIL/VENTOLIN HFA) 90 mcg/actuation inhaler INHALE 2 INHALATIONS BY MOUTH  INTO THE LUNGS EVERY 4 HOURS AS  NEEDED FOR WHEEZING OR SHORTNESS OF BREATH 17 g 9    ascorbic acid, vitamin C, (VITAMIN C) 500 MG tablet Take 500 mg by mouth once daily.      aspirin (ECOTRIN) 81 MG EC tablet Take 81 mg by mouth once daily.      atenoloL (TENORMIN) 25 MG tablet Take 1 tablet (25 mg total) by mouth once daily. 90 tablet 3    atorvastatin (LIPITOR) 40 MG tablet Take 1 tablet (40 mg total) by mouth once daily. 90 tablet 3    betamethasone dipropionate 0.05 % cream Apply topically 2 (two) times daily. (Patient not taking: Reported on 9/26/2023) 45 g 1    clopidogreL (PLAVIX) 75 mg tablet Take 1 tablet (75 mg total) by mouth once daily. 90 tablet 3    clotrimazole-betamethasone 1-0.05% (LOTRISONE) cream Apply topically 2 (two) times daily. 1 each 6    dutasteride (AVODART) 0.5 mg capsule Take 1 capsule (0.5 mg total) by mouth once daily. 90 capsule 3    ergocalciferol (VITAMIN D2) 50,000 unit Cap TAKE 1 CAPSULE BY MOUTH EVERY 30 DAYS 4 capsule 0    finasteride (PROSCAR) 5 mg tablet Take 1 tablet (5 mg total) by mouth once daily. 90 tablet 3    levothyroxine (SYNTHROID) 25 MCG tablet TAKE 1 TABLET BY MOUTH ONCE  DAILY BEFORE BREAKFAST ON EMPTY  STOMACH AND NOT TO EAT AT LEAST  FOR 1/2 HOUR AFTER 90 tablet 0     potassium chloride (KLOR-CON) 10 MEQ TbSR TAKE 1 TABLET BY MOUTH ONCE  DAILY 90 tablet 0    predniSONE (DELTASONE) 20 MG tablet Take 1 tablet (20 mg total) by mouth 2 (two) times daily. (Patient not taking: Reported on 9/26/2023) 10 tablet 0    tamsulosin (FLOMAX) 0.4 mg Cap Take 1 capsule (0.4 mg total) by mouth once daily. 90 capsule 3    vit A/vit C/vit E/zinc/copper (ICAPS AREDS ORAL) Take by mouth 2 (two) times a day.       No current facility-administered medications on file prior to visit.       Review of patient's allergies indicates:  No Known Allergies      Vital Signs: BP (!) 151/71   Pulse (!) 51   Resp 19   Wt 61.7 kg (136 lb 0.4 oz)   SpO2 98%   BMI 25.53 kg/m²     ECOG Performance Status: 2    Physical Exam  Vitals reviewed.   Constitutional:       Appearance: Normal appearance.   HENT:      Head: Normocephalic and atraumatic.   Eyes:      General: No scleral icterus.     Extraocular Movements: Extraocular movements intact.   Pulmonary:      Effort: Pulmonary effort is normal. No respiratory distress.   Abdominal:      General: There is no distension.   Musculoskeletal:      Cervical back: Neck supple.   Lymphadenopathy:      Cervical: No cervical adenopathy.   Skin:     General: Skin is warm and dry.   Neurological:      General: No focal deficit present.      Mental Status: He is alert and oriented to person, place, and time.      Cranial Nerves: No cranial nerve deficit.   Psychiatric:         Mood and Affect: Mood normal.         Behavior: Behavior normal.         Judgment: Judgment normal.          Labs:    Imaging: I have personally reviewed the patient's available images and reports and summarized pertinent findings above in HPI.     Pathology: No new path

## 2023-10-27 ENCOUNTER — HOSPITAL ENCOUNTER (OUTPATIENT)
Dept: RADIOLOGY | Facility: HOSPITAL | Age: 83
Discharge: HOME | End: 2023-10-27
Attending: RADIOLOGY
Payer: MEDICARE

## 2023-10-27 ENCOUNTER — OFFICE VISIT (OUTPATIENT)
Dept: RADIATION ONCOLOGY | Facility: CLINIC | Age: 83
End: 2023-10-27
Payer: MEDICARE

## 2023-10-27 VITALS
HEART RATE: 51 BPM | BODY MASS INDEX: 25.53 KG/M2 | DIASTOLIC BLOOD PRESSURE: 71 MMHG | WEIGHT: 136 LBS | SYSTOLIC BLOOD PRESSURE: 151 MMHG | OXYGEN SATURATION: 98 % | RESPIRATION RATE: 19 BRPM

## 2023-10-27 DIAGNOSIS — Z85.118 PERSONAL HISTORY OF LUNG CANCER: ICD-10-CM

## 2023-10-27 DIAGNOSIS — Z85.118 PERSONAL HISTORY OF LUNG CANCER: Primary | ICD-10-CM

## 2023-10-27 PROCEDURE — 3078F DIAST BP <80 MM HG: CPT | Mod: CPTII,S$GLB,, | Performed by: RADIOLOGY

## 2023-10-27 PROCEDURE — 3288F FALL RISK ASSESSMENT DOCD: CPT | Mod: CPTII,S$GLB,, | Performed by: RADIOLOGY

## 2023-10-27 PROCEDURE — 99213 OFFICE O/P EST LOW 20 MIN: CPT | Mod: S$GLB,,, | Performed by: RADIOLOGY

## 2023-10-27 PROCEDURE — 1101F PT FALLS ASSESS-DOCD LE1/YR: CPT | Mod: CPTII,S$GLB,, | Performed by: RADIOLOGY

## 2023-10-27 PROCEDURE — 1157F PR ADVANCE CARE PLAN OR EQUIV PRESENT IN MEDICAL RECORD: ICD-10-PCS | Mod: CPTII,S$GLB,, | Performed by: RADIOLOGY

## 2023-10-27 PROCEDURE — 1159F MED LIST DOCD IN RCRD: CPT | Mod: CPTII,S$GLB,, | Performed by: RADIOLOGY

## 2023-10-27 PROCEDURE — 1101F PR PT FALLS ASSESS DOC 0-1 FALLS W/OUT INJ PAST YR: ICD-10-PCS | Mod: CPTII,S$GLB,, | Performed by: RADIOLOGY

## 2023-10-27 PROCEDURE — 3078F PR MOST RECENT DIASTOLIC BLOOD PRESSURE < 80 MM HG: ICD-10-PCS | Mod: CPTII,S$GLB,, | Performed by: RADIOLOGY

## 2023-10-27 PROCEDURE — 71250 CT THORAX DX C-: CPT | Mod: 26,,, | Performed by: STUDENT IN AN ORGANIZED HEALTH CARE EDUCATION/TRAINING PROGRAM

## 2023-10-27 PROCEDURE — 71250 CT CHEST WITHOUT CONTRAST: ICD-10-PCS | Mod: 26,,, | Performed by: STUDENT IN AN ORGANIZED HEALTH CARE EDUCATION/TRAINING PROGRAM

## 2023-10-27 PROCEDURE — 1126F AMNT PAIN NOTED NONE PRSNT: CPT | Mod: CPTII,S$GLB,, | Performed by: RADIOLOGY

## 2023-10-27 PROCEDURE — 99999 PR PBB SHADOW E&M-EST. PATIENT-LVL IV: CPT | Mod: PBBFAC,,, | Performed by: RADIOLOGY

## 2023-10-27 PROCEDURE — 1126F PR PAIN SEVERITY QUANTIFIED, NO PAIN PRESENT: ICD-10-PCS | Mod: CPTII,S$GLB,, | Performed by: RADIOLOGY

## 2023-10-27 PROCEDURE — 71250 CT THORAX DX C-: CPT | Mod: TC

## 2023-10-27 PROCEDURE — 99999 PR PBB SHADOW E&M-EST. PATIENT-LVL IV: ICD-10-PCS | Mod: PBBFAC,,, | Performed by: RADIOLOGY

## 2023-10-27 PROCEDURE — 3077F SYST BP >= 140 MM HG: CPT | Mod: CPTII,S$GLB,, | Performed by: RADIOLOGY

## 2023-10-27 PROCEDURE — 3077F PR MOST RECENT SYSTOLIC BLOOD PRESSURE >= 140 MM HG: ICD-10-PCS | Mod: CPTII,S$GLB,, | Performed by: RADIOLOGY

## 2023-10-27 PROCEDURE — 1157F ADVNC CARE PLAN IN RCRD: CPT | Mod: CPTII,S$GLB,, | Performed by: RADIOLOGY

## 2023-10-27 PROCEDURE — 3288F PR FALLS RISK ASSESSMENT DOCUMENTED: ICD-10-PCS | Mod: CPTII,S$GLB,, | Performed by: RADIOLOGY

## 2023-10-27 PROCEDURE — 1159F PR MEDICATION LIST DOCUMENTED IN MEDICAL RECORD: ICD-10-PCS | Mod: CPTII,S$GLB,, | Performed by: RADIOLOGY

## 2023-10-27 PROCEDURE — 99213 PR OFFICE/OUTPT VISIT, EST, LEVL III, 20-29 MIN: ICD-10-PCS | Mod: S$GLB,,, | Performed by: RADIOLOGY

## 2023-12-20 PROCEDURE — 93010 EKG 12-LEAD: ICD-10-PCS | Mod: S$GLB,,, | Performed by: INTERNAL MEDICINE

## 2023-12-20 PROCEDURE — 93010 ELECTROCARDIOGRAM REPORT: CPT | Mod: S$GLB,,, | Performed by: INTERNAL MEDICINE

## 2024-01-01 ENCOUNTER — NURSE TRIAGE (OUTPATIENT)
Dept: ADMINISTRATIVE | Facility: CLINIC | Age: 84
End: 2024-01-01
Payer: MEDICARE

## 2024-01-01 NOTE — TELEPHONE ENCOUNTER
Went to restroom bout 20 minutes ago and it was nothing but blood. He is not circumcised and its been over a year since he was able to get the foreskin back over the head of his penis. 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 It was very dark. Care advice recommend pt go to Er. Pt verbalized understanding.   Reason for Disposition   Passing pure blood or large blood clots (i.e., size > a dime)  (Exception: Sola or small strands.)    Additional Information   Negative: Shock suspected (e.g., cold/pale/clammy skin, too weak to stand, low BP, rapid pulse)   Negative: Sounds like a life-threatening emergency to the triager   Negative: Recent back or abdominal injury   Negative: Recent genital injury   Negative: [1] Unable to urinate (or only a few drops) > 4 hours AND [2] bladder feels very full (e.g., palpable bladder or strong urge to urinate)   Negative: [1] Diffuse (all over) muscle pains in the shoulders, arms, legs, and back AND [2] dark (cola or tea-colored) or red-colored urine    Protocols used: Urine - Blood In-A-

## 2024-01-02 ENCOUNTER — OFFICE VISIT (OUTPATIENT)
Dept: UROLOGY | Facility: CLINIC | Age: 84
End: 2024-01-02
Payer: MEDICARE

## 2024-01-02 VITALS
WEIGHT: 131.63 LBS | SYSTOLIC BLOOD PRESSURE: 116 MMHG | DIASTOLIC BLOOD PRESSURE: 63 MMHG | HEART RATE: 58 BPM | BODY MASS INDEX: 24.71 KG/M2

## 2024-01-02 DIAGNOSIS — R31.0 GROSS HEMATURIA: Primary | ICD-10-CM

## 2024-01-02 LAB
BILIRUB SERPL-MCNC: NEGATIVE MG/DL
BLOOD URINE, POC: NORMAL
CLARITY, POC UA: NORMAL
COLOR, POC UA: YELLOW
GLUCOSE UR QL STRIP: NEGATIVE
KETONES UR QL STRIP: NEGATIVE
LEUKOCYTE ESTERASE URINE, POC: 1
NITRITE, POC UA: NEGATIVE
PH, POC UA: 6.5
PROTEIN, POC: 1
SPECIFIC GRAVITY, POC UA: 1.01
UROBILINOGEN, POC UA: NORMAL

## 2024-01-02 PROCEDURE — 87086 URINE CULTURE/COLONY COUNT: CPT | Performed by: NURSE PRACTITIONER

## 2024-01-02 PROCEDURE — 81002 URINALYSIS NONAUTO W/O SCOPE: CPT | Mod: S$GLB,,, | Performed by: NURSE PRACTITIONER

## 2024-01-02 PROCEDURE — 99213 OFFICE O/P EST LOW 20 MIN: CPT | Mod: S$GLB,,, | Performed by: NURSE PRACTITIONER

## 2024-01-02 PROCEDURE — 99999 PR PBB SHADOW E&M-EST. PATIENT-LVL IV: CPT | Mod: PBBFAC,,, | Performed by: NURSE PRACTITIONER

## 2024-01-02 RX ORDER — NITROFURANTOIN 25; 75 MG/1; MG/1
100 CAPSULE ORAL 2 TIMES DAILY
Qty: 14 CAPSULE | Refills: 0 | Status: SHIPPED | OUTPATIENT
Start: 2024-01-02

## 2024-01-02 NOTE — PROGRESS NOTES
Subjective:      Martell Whitaker is a 83 y.o. male who returns today regarding his GH.    Hematuria  Patient complains of gross hematuria. Onset of hematuria was 1 day ago and was sudden in onset. There is not a history of nephrolithiasis. There is not a history of urologic trauma. Also reports intermittent dysuria. Patient admits to history of  bladder cancer diagnosed over 10 years ago; records not available .   Hx of phimosis; not bothersome  On Plavix and ASA daily     The following portions of the patient's history were reviewed and updated as appropriate: allergies, current medications, past family history, past medical history, past social history, past surgical history and problem list.    Review of Systems  A comprehensive multipoint review of systems was negative except as otherwise stated in the HPI.     Objective:   Vitals: /63 (BP Location: Left arm, Patient Position: Sitting, BP Method: Medium (Automatic))   Pulse (!) 58   Wt 59.7 kg (131 lb 9.8 oz)   BMI 24.71 kg/m²     Physical Exam   General: alert and oriented, no acute distress  Respiratory: Symmetric expansion, non-labored breathing  Cardiovascular: regular rate and rhythm, no peripheral edema  Abdomen: soft, non distended  Skin: normal coloration and turgor, no rashes, no suspicious skin lesions noted  Neuro: no gross deficits  Psych: and non-anxious    Lab Review   Urinalysis demonstrates positive for leukocytes, red blood cells  Lab Results   Component Value Date    WBC 7.10 10/03/2023    HGB 14.6 10/03/2023    HCT 46.3 10/03/2023    MCV 99 (H) 10/03/2023     10/03/2023     Lab Results   Component Value Date    CREATININE 1.0 10/03/2023    BUN 17 10/03/2023     Lab Results   Component Value Date    PSA 1.4 03/04/2019    PSADIAG 1.22 12/20/2022       Assessment and Plan:   1. Gross hematuria  --will start Macrobid and send UC today   --if urine culture is negative will proceed with full hematuria workup including CT urogram and  in office cystoscope      - CT Urogram Abd Pelvis W WO; Future  - Cystoscopy; Future  - Urine culture  - Creatinine, serum; Future  - nitrofurantoin, macrocrystal-monohydrate, (MACROBID) 100 MG capsule; Take 1 capsule (100 mg total) by mouth 2 (two) times daily.  Dispense: 14 capsule; Refill: 0         This note is dictated on M*Modal word recognition program.  There are word recognition mistakes that are occasionally missed on review.

## 2024-01-03 ENCOUNTER — OFFICE VISIT (OUTPATIENT)
Dept: CARDIOLOGY | Facility: CLINIC | Age: 84
End: 2024-01-03
Payer: MEDICARE

## 2024-01-03 VITALS
WEIGHT: 135.38 LBS | DIASTOLIC BLOOD PRESSURE: 65 MMHG | HEIGHT: 61 IN | BODY MASS INDEX: 25.56 KG/M2 | OXYGEN SATURATION: 98 % | SYSTOLIC BLOOD PRESSURE: 137 MMHG | HEART RATE: 55 BPM

## 2024-01-03 DIAGNOSIS — I10 PRIMARY HYPERTENSION: Chronic | ICD-10-CM

## 2024-01-03 DIAGNOSIS — I25.10 CORONARY ARTERY DISEASE DUE TO LIPID RICH PLAQUE: Primary | Chronic | ICD-10-CM

## 2024-01-03 DIAGNOSIS — I25.83 CORONARY ARTERY DISEASE DUE TO LIPID RICH PLAQUE: Primary | Chronic | ICD-10-CM

## 2024-01-03 DIAGNOSIS — Z72.0 TOBACCO USER: ICD-10-CM

## 2024-01-03 PROCEDURE — 99213 OFFICE O/P EST LOW 20 MIN: CPT | Mod: S$GLB,,, | Performed by: NURSE PRACTITIONER

## 2024-01-03 PROCEDURE — 99999 PR PBB SHADOW E&M-EST. PATIENT-LVL IV: CPT | Mod: PBBFAC,,, | Performed by: NURSE PRACTITIONER

## 2024-01-03 NOTE — PROGRESS NOTES
Cardiology    1/3/2024  10:44 AM    Problem list  Patient Active Problem List   Diagnosis    HTN (hypertension)    Hypercholesteremia    CAD (coronary artery disease)    Pulmonary nodule    Urine retention    Hx of myocardial infarction    Enlarged prostate    History of bladder cancer    Abdominal aortic aneurysm (AAA) without rupture    PVD (peripheral vascular disease)    Carotid stenosis, asymptomatic, bilateral    Hemoptysis    Centrilobular emphysema    Tobacco user    S/P PTCA (percutaneous transluminal coronary angioplasty)    Aortic arch atherosclerosis    History of endovascular stent graft for abdominal aortic aneurysm (AAA)    RBBB    Dyspnea on exertion    Localized edema    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  Saw urology and has a cystoscope pending.  Feels well, still smoking the same amount, feels like he is losing a lot a weight- will golden an egg or have a sandwich instead of the meals prepared at home as it is food he doesn't eat.  He is hoping to get something that will; increase his appetite.  He does not usually eat breakfast but is trying to now.     Medications  Current Outpatient Medications   Medication Sig Dispense Refill    albuterol (PROVENTIL/VENTOLIN HFA) 90 mcg/actuation inhaler INHALE 2 INHALATIONS BY MOUTH  INTO THE LUNGS EVERY 4 HOURS AS  NEEDED FOR WHEEZING OR SHORTNESS OF BREATH 17 g 9    ascorbic acid, vitamin C, (VITAMIN C) 500 MG tablet Take 500 mg by mouth once daily.      aspirin (ECOTRIN) 81 MG EC tablet Take 81 mg by mouth once daily.      atenoloL (TENORMIN) 25 MG tablet Take 1 tablet (25 mg total) by mouth once daily. 90 tablet 3    atorvastatin (LIPITOR) 40 MG tablet Take 1 tablet (40 mg total) by mouth once daily. 90 tablet 3    clopidogreL (PLAVIX) 75 mg tablet Take 1 tablet (75 mg  total) by mouth once daily. 90 tablet 3    clotrimazole-betamethasone 1-0.05% (LOTRISONE) cream Apply topically 2 (two) times daily. 1 each 6    ergocalciferol (VITAMIN D2) 50,000 unit Cap TAKE 1 CAPSULE BY MOUTH EVERY 30 DAYS 4 capsule 2    finasteride (PROSCAR) 5 mg tablet Take 1 tablet (5 mg total) by mouth once daily. 90 tablet 3    levothyroxine (SYNTHROID) 25 MCG tablet TAKE 1 TABLET BY MOUTH ONCE  DAILY BEFORE BREAKFAST ON EMPTY  STOMACH AND NOT TO EAT AT LEAST  FOR 1/2 HOUR AFTER 30 tablet 0    nitrofurantoin, macrocrystal-monohydrate, (MACROBID) 100 MG capsule Take 1 capsule (100 mg total) by mouth 2 (two) times daily. 14 capsule 0    potassium chloride (KLOR-CON) 10 MEQ TbSR TAKE 1 TABLET BY MOUTH ONCE  DAILY 30 tablet 0    tamsulosin (FLOMAX) 0.4 mg Cap Take 1 capsule (0.4 mg total) by mouth once daily. 90 capsule 3    vit A/vit C/vit E/zinc/copper (ICAPS AREDS ORAL) Take by mouth 2 (two) times a day.      betamethasone dipropionate 0.05 % cream Apply topically 2 (two) times daily. (Patient not taking: Reported on 9/26/2023) 45 g 1    predniSONE (DELTASONE) 20 MG tablet Take 1 tablet (20 mg total) by mouth 2 (two) times daily. (Patient not taking: Reported on 1/3/2024) 10 tablet 0     No current facility-administered medications for this visit.      Prior to Admission medications    Medication Sig Start Date End Date Taking? Authorizing Provider   albuterol (PROVENTIL/VENTOLIN HFA) 90 mcg/actuation inhaler INHALE 2 INHALATIONS BY MOUTH  INTO THE LUNGS EVERY 4 HOURS AS  NEEDED FOR WHEEZING OR SHORTNESS OF BREATH 7/31/23  Yes Tana Luciano DNP   ascorbic acid, vitamin C, (VITAMIN C) 500 MG tablet Take 500 mg by mouth once daily.   Yes Provider, Historical   aspirin (ECOTRIN) 81 MG EC tablet Take 81 mg by mouth once daily.   Yes Provider, Historical   atenoloL (TENORMIN) 25 MG tablet Take 1 tablet (25 mg total) by mouth once daily. 6/8/23  Yes Tana Luciano DNP   atorvastatin (LIPITOR) 40 MG tablet  Take 1 tablet (40 mg total) by mouth once daily. 6/8/23  Yes Tana Luciano DNP   clopidogreL (PLAVIX) 75 mg tablet Take 1 tablet (75 mg total) by mouth once daily. 6/8/23  Yes Tana Luciano DNP   clotrimazole-betamethasone 1-0.05% (LOTRISONE) cream Apply topically 2 (two) times daily. 2/2/23  Yes Concepcion Leon NP   ergocalciferol (VITAMIN D2) 50,000 unit Cap TAKE 1 CAPSULE BY MOUTH EVERY 30 DAYS 12/11/23  Yes Emelia Snyder MD   finasteride (PROSCAR) 5 mg tablet Take 1 tablet (5 mg total) by mouth once daily. 9/26/23 9/25/24 Yes Poncho Guevara MD   levothyroxine (SYNTHROID) 25 MCG tablet TAKE 1 TABLET BY MOUTH ONCE  DAILY BEFORE BREAKFAST ON EMPTY  STOMACH AND NOT TO EAT AT LEAST  FOR 1/2 HOUR AFTER 12/26/23  Yes Emelia Snyder MD   nitrofurantoin, macrocrystal-monohydrate, (MACROBID) 100 MG capsule Take 1 capsule (100 mg total) by mouth 2 (two) times daily. 1/2/24  Yes Concepcion Leon NP   potassium chloride (KLOR-CON) 10 MEQ TbSR TAKE 1 TABLET BY MOUTH ONCE  DAILY 12/26/23  Yes Emelia Snyder MD   tamsulosin (FLOMAX) 0.4 mg Cap Take 1 capsule (0.4 mg total) by mouth once daily. 7/28/23 7/27/24 Yes Poncho Guevara MD   vit A/vit C/vit E/zinc/copper (ICAPS AREDS ORAL) Take by mouth 2 (two) times a day.   Yes Provider, Historical   betamethasone dipropionate 0.05 % cream Apply topically 2 (two) times daily.  Patient not taking: Reported on 9/26/2023 4/26/23 7/21/23  Poncho Guevara MD   predniSONE (DELTASONE) 20 MG tablet Take 1 tablet (20 mg total) by mouth 2 (two) times daily.  Patient not taking: Reported on 1/3/2024 6/8/23   Tana Luciano, VI         History  Past Medical History:   Diagnosis Date    AAA (abdominal aortic aneurysm) without rupture     pt with h/o aaa     Acute coronary syndrome     Cancer     bladder    Carotid artery occlusion     COPD (chronic obstructive pulmonary disease)     Coronary artery disease     Enlarged prostate     History of bladder  cancer     Hyperlipidemia     Hypertension     Lung abnormality     spot on lung under care of pulmonologist at [facility] Dr lua    Multiple thyroid nodules 1/31/2023    Myocardial infarct 1988    Urine retention      Past Surgical History:   Procedure Laterality Date    ABDOMINAL AORTIC ANEURYSM REPAIR  2007    BLADDER SURGERY      CARDIAC CATHETERIZATION      CORONARY ANGIOPLASTY      CORONARY STENT PLACEMENT      FRACTURE SURGERY      arm    PROSTATE SURGERY      ROBOTIC BRONCHOSCOPY N/A 5/31/2022    Procedure: ROBOTIC BRONCHOSCOPY;  Surgeon: Mickey Acosta MD;  Location: [facility] OR [address] [place] [address];  Service: Pulmonary;  Laterality: N/A;    TONSILLECTOMY       Social History     Socioeconomic History    Marital status:    Occupational History    Occupation: retired    Tobacco Use    Smoking status: Every Day     Current packs/day: 0.50     Average packs/day: 0.5 packs/day for 60.0 years (30.0 ttl pk-yrs)     Types: Cigarettes    Smokeless tobacco: Never   Substance and Sexual Activity    Alcohol use: Yes     Alcohol/week: 1.0 standard drink of alcohol     Types: 1 Shots of liquor per week     Comment:  five drink per week     Drug use: No    Sexual activity: Yes     Partners: Female     Birth control/protection: None         Allergies  Review of patient's allergies indicates:  No Known Allergies      Review of Systems   Review of Systems   Constitutional: Positive for decreased appetite and malaise/fatigue. Negative for diaphoresis.   HENT: Negative.     Cardiovascular:  Negative for chest pain, claudication, dyspnea on exertion, irregular heartbeat, leg swelling, near-syncope, orthopnea, palpitations, paroxysmal nocturnal dyspnea and syncope.   Respiratory:  Negative for shortness of breath.    Endocrine: Negative for polydipsia, polyphagia and polyuria.   Hematologic/Lymphatic: Does not bruise/bleed easily.   Gastrointestinal:  Negative for bloating, nausea and vomiting.   Genitourinary: Negative.     Neurological:  Negative for excessive daytime sleepiness, dizziness, light-headedness, loss of balance and weakness.   Psychiatric/Behavioral:  The patient is not nervous/anxious.    Allergic/Immunologic: Negative.        Physical Exam  Wt Readings from Last 1 Encounters:   01/03/24 61.4 kg (135 lb 5.8 oz)     BP Readings from Last 3 Encounters:   01/03/24 137/65   01/02/24 116/63   10/27/23 (!) 151/71     Pulse Readings from Last 1 Encounters:   01/03/24 (!) 55     Body mass index is 25.41 kg/m².    Physical Exam  Vitals and nursing note reviewed.   Constitutional:       Appearance: Normal appearance.   HENT:      Head: Normocephalic and atraumatic.      Mouth/Throat:      Mouth: Mucous membranes are moist.   Eyes:      Pupils: Pupils are equal, round, and reactive to light.   Cardiovascular:      Rate and Rhythm: Normal rate and regular rhythm.      Pulses:           Radial pulses are 2+ on the right side and 2+ on the left side.        Dorsalis pedis pulses are 2+ on the right side and 2+ on the left side.        Posterior tibial pulses are 2+ on the right side and 2+ on the left side.      Heart sounds: No murmur heard.  Pulmonary:      Effort: Pulmonary effort is normal. No respiratory distress.      Breath sounds: Wheezing present.      Comments: diminished  Abdominal:      General: There is no distension.      Tenderness: There is no abdominal tenderness.   Musculoskeletal:      Cervical back: Normal range of motion.      Right lower leg: No edema.      Left lower leg: No edema.   Skin:     General: Skin is warm and dry.      Findings: No erythema.   Neurological:      General: No focal deficit present.      Mental Status: He is alert.   Psychiatric:         Mood and Affect: Mood normal.         Behavior: Behavior normal.               Problem List Items Addressed This Visit          Cardiac/Vascular    HTN (hypertension) (Chronic)    Overview     Home log reviewed  Well controlled  Continue meds as now          Current Assessment & Plan     As above         CAD (coronary artery disease) - Primary (Chronic)    Overview     Continue ASA and statin         Current Assessment & Plan     As above            Other    Tobacco user    Overview     Discussed imperative need to quit.  Patient is aware and is trying to cut back on his own         Current Assessment & Plan     As above          RTC 3 months      @Tana Luciano DNP

## 2024-01-04 LAB
BACTERIA UR CULT: NORMAL
BACTERIA UR CULT: NORMAL

## 2024-01-08 ENCOUNTER — TELEPHONE (OUTPATIENT)
Dept: UROLOGY | Facility: CLINIC | Age: 84
End: 2024-01-08
Payer: MEDICARE

## 2024-01-08 NOTE — TELEPHONE ENCOUNTER
Informed patient about urine culture results and informed patient that he still needs to follow up with CT scan.  Answered all of patient questions. Patient verbalized understanding.    KINGSLEY Leonard

## 2024-01-08 NOTE — TELEPHONE ENCOUNTER
----- Message from Julia Mayo MA sent at 1/8/2024 11:47 AM CST -----  Regarding: FW: Results  Contact: pt [phone]    ----- Message -----  From: Vanessa Davis  Sent: 1/8/2024  11:01 AM CST  To: Paola Isaac Staff  Subject: Results                                          Pt calling to discuss results of urine test. Pls call

## 2024-01-11 ENCOUNTER — TELEPHONE (OUTPATIENT)
Dept: UROLOGY | Facility: CLINIC | Age: 84
End: 2024-01-11
Payer: MEDICARE

## 2024-01-11 NOTE — TELEPHONE ENCOUNTER
----- Message from Concepcion Leon NP sent at 1/11/2024  2:18 PM CST -----  Please call pt with results per his request: CT urogram is negative for tumors or masses.  It does show bilateral small kidney stones that are not actively passing.  It also shows bilateral kidney cysts which are non worrisome.  Please proceed with in office cystoscope for further evaluation of blood in your urine.

## 2024-01-24 ENCOUNTER — PROCEDURE VISIT (OUTPATIENT)
Dept: UROLOGY | Facility: CLINIC | Age: 84
End: 2024-01-24
Payer: MEDICARE

## 2024-01-24 VITALS
WEIGHT: 131.81 LBS | BODY MASS INDEX: 24.75 KG/M2 | DIASTOLIC BLOOD PRESSURE: 61 MMHG | SYSTOLIC BLOOD PRESSURE: 114 MMHG | HEART RATE: 59 BPM

## 2024-01-24 DIAGNOSIS — R31.0 GROSS HEMATURIA: Primary | ICD-10-CM

## 2024-01-24 LAB
BILIRUB SERPL-MCNC: NEGATIVE MG/DL
BLOOD URINE, POC: NORMAL
CLARITY, POC UA: NORMAL
COLOR, POC UA: YELLOW
GLUCOSE UR QL STRIP: NEGATIVE
KETONES UR QL STRIP: NEGATIVE
LEUKOCYTE ESTERASE URINE, POC: 1
NITRITE, POC UA: NEGATIVE
PH, POC UA: 6
PROTEIN, POC: 1
SPECIFIC GRAVITY, POC UA: 1.01
UROBILINOGEN, POC UA: NORMAL

## 2024-01-24 PROCEDURE — 81002 URINALYSIS NONAUTO W/O SCOPE: CPT | Mod: S$GLB,,, | Performed by: UROLOGY

## 2024-01-24 PROCEDURE — 52000 CYSTOURETHROSCOPY: CPT | Mod: S$GLB,,, | Performed by: UROLOGY

## 2024-01-24 RX ORDER — CIPROFLOXACIN 500 MG/1
500 TABLET ORAL
Status: COMPLETED | OUTPATIENT
Start: 2024-01-24 | End: 2024-01-24

## 2024-01-24 RX ORDER — LIDOCAINE HYDROCHLORIDE 20 MG/ML
JELLY TOPICAL
Status: COMPLETED | OUTPATIENT
Start: 2024-01-24 | End: 2024-01-24

## 2024-01-24 RX ADMIN — CIPROFLOXACIN 500 MG: 500 TABLET ORAL at 10:01

## 2024-01-24 RX ADMIN — LIDOCAINE HYDROCHLORIDE: 20 JELLY TOPICAL at 10:01

## 2024-01-24 NOTE — PROCEDURES
Cystoscopy    Date/Time: 1/24/2024 9:00 AM    Performed by: Poncho Guevara MD  Authorized by: Concepcion Leon NP    Prep: patient was prepped and draped in usual sterile fashion    Anesthesia:  Lidocaine jelly  Indications: hematuria    Position:  Supine  Anesthesia:  Lidocaine jelly  Preparation: Patient was prepped and draped in usual sterile fashion    Scope type:  Flexible cystoscope  External exam normal: No    Circumcised: No (Phimosis)    Urethra normal: Yes    Prostate normal: Yes    Bladder neck normal: Yes    Bladder normal: Yes (No tumors, lesions, or stones noted.  Normal bilateral UOs.)     Patient tolerated the procedure well with no immediate complications    Imaging  CT Urogram reviewed.  No stones or lesions.  Normal study.    Impression: Negative hematuria workup    Plan:  -- FU PRN

## 2024-03-05 PROBLEM — Z72.0 TOBACCO USER: Status: RESOLVED | Noted: 2021-09-22 | Resolved: 2024-03-05

## 2024-03-05 PROBLEM — R06.09 DYSPNEA ON EXERTION: Status: RESOLVED | Noted: 2021-10-27 | Resolved: 2024-03-05

## 2024-03-05 PROBLEM — R60.0 LOCALIZED EDEMA: Status: RESOLVED | Noted: 2021-10-27 | Resolved: 2024-03-05

## 2024-03-05 PROBLEM — R04.2 HEMOPTYSIS: Status: RESOLVED | Noted: 2021-07-14 | Resolved: 2024-03-05

## 2024-03-25 ENCOUNTER — TELEPHONE (OUTPATIENT)
Dept: ORTHOPEDICS | Facility: CLINIC | Age: 84
End: 2024-03-25
Payer: MEDICARE

## 2024-03-25 NOTE — TELEPHONE ENCOUNTER
Spoke with patient to inform him to discontinue taking medication and to follow up in a few months to give medication time to wear off. Patient verbalized understanding.

## 2024-03-25 NOTE — TELEPHONE ENCOUNTER
----- Message from Poncho Guevara MD sent at 3/25/2024  1:21 PM CDT -----  Regarding: RE: Pain  Contact: pt @ [phone]  He should stop taking it and then schedule an FU visit in the next couple of months. (It will take several months for the medication to fully wear off.)    ----- Message -----  From: Julia Mayo MA  Sent: 3/25/2024   1:07 PM CDT  To: Poncho Guevara MD  Subject: FW: Pain                                         Patient stated he's been having pain the last four days. They're feeling very tender and would like to know the next steps to take. Please Advise.    Thanks  Julia  ----- Message -----  From: Nighat Wilde  Sent: 3/25/2024  12:06 PM CDT  To: Paola Isaac Staff  Subject: Pain                                             Pt is calling because medication finasteride (PROSCAR) 5 mg tablet is causing his breast to increase and causing pain. Asking for a call back

## 2024-04-04 ENCOUNTER — OFFICE VISIT (OUTPATIENT)
Dept: CARDIOLOGY | Facility: CLINIC | Age: 84
End: 2024-04-04
Payer: MEDICARE

## 2024-04-04 VITALS
BODY MASS INDEX: 25.97 KG/M2 | SYSTOLIC BLOOD PRESSURE: 126 MMHG | WEIGHT: 137.56 LBS | OXYGEN SATURATION: 100 % | HEART RATE: 53 BPM | HEIGHT: 61 IN | DIASTOLIC BLOOD PRESSURE: 62 MMHG

## 2024-04-04 DIAGNOSIS — Z98.61 S/P PTCA (PERCUTANEOUS TRANSLUMINAL CORONARY ANGIOPLASTY): ICD-10-CM

## 2024-04-04 DIAGNOSIS — I65.23 CAROTID STENOSIS, ASYMPTOMATIC, BILATERAL: ICD-10-CM

## 2024-04-04 DIAGNOSIS — I73.9 PVD (PERIPHERAL VASCULAR DISEASE): ICD-10-CM

## 2024-04-04 DIAGNOSIS — Z95.828 HISTORY OF ENDOVASCULAR STENT GRAFT FOR ABDOMINAL AORTIC ANEURYSM (AAA): ICD-10-CM

## 2024-04-04 DIAGNOSIS — E78.00 HYPERCHOLESTEREMIA: Primary | Chronic | ICD-10-CM

## 2024-04-04 DIAGNOSIS — I10 PRIMARY HYPERTENSION: Chronic | ICD-10-CM

## 2024-04-04 DIAGNOSIS — I25.10 CORONARY ARTERY DISEASE INVOLVING NATIVE CORONARY ARTERY OF NATIVE HEART WITHOUT ANGINA PECTORIS: Chronic | ICD-10-CM

## 2024-04-04 DIAGNOSIS — I45.10 RBBB: ICD-10-CM

## 2024-04-04 PROCEDURE — 3074F SYST BP LT 130 MM HG: CPT | Mod: CPTII,S$GLB,, | Performed by: INTERNAL MEDICINE

## 2024-04-04 PROCEDURE — 1160F RVW MEDS BY RX/DR IN RCRD: CPT | Mod: CPTII,S$GLB,, | Performed by: INTERNAL MEDICINE

## 2024-04-04 PROCEDURE — 99999 PR PBB SHADOW E&M-EST. PATIENT-LVL IV: CPT | Mod: PBBFAC,,, | Performed by: INTERNAL MEDICINE

## 2024-04-04 PROCEDURE — 1157F ADVNC CARE PLAN IN RCRD: CPT | Mod: CPTII,S$GLB,, | Performed by: INTERNAL MEDICINE

## 2024-04-04 PROCEDURE — 1101F PT FALLS ASSESS-DOCD LE1/YR: CPT | Mod: CPTII,S$GLB,, | Performed by: INTERNAL MEDICINE

## 2024-04-04 PROCEDURE — 1126F AMNT PAIN NOTED NONE PRSNT: CPT | Mod: CPTII,S$GLB,, | Performed by: INTERNAL MEDICINE

## 2024-04-04 PROCEDURE — 3078F DIAST BP <80 MM HG: CPT | Mod: CPTII,S$GLB,, | Performed by: INTERNAL MEDICINE

## 2024-04-04 PROCEDURE — 1159F MED LIST DOCD IN RCRD: CPT | Mod: CPTII,S$GLB,, | Performed by: INTERNAL MEDICINE

## 2024-04-04 PROCEDURE — 3288F FALL RISK ASSESSMENT DOCD: CPT | Mod: CPTII,S$GLB,, | Performed by: INTERNAL MEDICINE

## 2024-04-04 PROCEDURE — 99204 OFFICE O/P NEW MOD 45 MIN: CPT | Mod: S$GLB,,, | Performed by: INTERNAL MEDICINE

## 2024-04-04 NOTE — PROGRESS NOTES
[facility] - Cardiology [address]  Cardiology Clinic Note      Chief Complaint  Chief Complaint   Patient presents with    Follow-up       HPI:    83-year-old male with past medical history thyroid disease, BPH with history of urinary retention, COPD, bladder cancer, non-small-cell lung cancer status post radiation, hypertension, hyperlipidemia, AAA status post EVAR no records, peripheral vascular disease right mid SFA ultrasound 2021, carotid stenosis not hemodynamically significant by ultrasound 2020, coronary artery disease status post MI and PCI to the RCA 1996, echo 2021 EF estimated 45% grade 1 diastolic dysfunction mild LVH akinetic basal inferior basal inferolateral mid inferior walls normal PASP normal RV normal CVP    No cardiovascular symptoms    Medications  Current Outpatient Medications   Medication Sig Dispense Refill    ascorbic acid, vitamin C, (VITAMIN C) 500 MG tablet Take 500 mg by mouth once daily.      aspirin (ECOTRIN) 81 MG EC tablet Take 81 mg by mouth once daily.      atenoloL (TENORMIN) 25 MG tablet Take 1 tablet (25 mg total) by mouth once daily. 90 tablet 3    atorvastatin (LIPITOR) 40 MG tablet Take 1 tablet (40 mg total) by mouth once daily. 90 tablet 3    clopidogreL (PLAVIX) 75 mg tablet Take 1 tablet (75 mg total) by mouth once daily. 90 tablet 3    levothyroxine (SYNTHROID) 25 MCG tablet One tab po daily before breakfast 30 tablet 0    potassium chloride (KLOR-CON) 10 MEQ TbSR TAKE 1 TABLET BY MOUTH ONCE  DAILY 30 tablet 0    tamsulosin (FLOMAX) 0.4 mg Cap Take 1 capsule (0.4 mg total) by mouth once daily. 90 capsule 3    vit A/vit C/vit E/zinc/copper (ICAPS AREDS ORAL) Take by mouth 2 (two) times a day.      albuterol (PROVENTIL/VENTOLIN HFA) 90 mcg/actuation inhaler INHALE 2 INHALATIONS BY MOUTH  INTO THE LUNGS EVERY 4 HOURS AS  NEEDED FOR WHEEZING OR SHORTNESS OF BREATH 17 g 9    betamethasone dipropionate 0.05 % cream Apply topically 2 (two) times daily. (Patient not taking:  Reported on 9/26/2023) 45 g 1    clotrimazole-betamethasone 1-0.05% (LOTRISONE) cream Apply topically 2 (two) times daily. (Patient not taking: Reported on 3/4/2024) 1 each 6    ergocalciferol (VITAMIN D2) 50,000 unit Cap TAKE 1 CAPSULE BY MOUTH EVERY 30 DAYS 4 capsule 2    finasteride (PROSCAR) 5 mg tablet Take 1 tablet (5 mg total) by mouth once daily. (Patient not taking: Reported on 4/4/2024) 90 tablet 3    nitrofurantoin, macrocrystal-monohydrate, (MACROBID) 100 MG capsule Take 1 capsule (100 mg total) by mouth 2 (two) times daily. (Patient not taking: Reported on 1/31/2024) 14 capsule 0    predniSONE (DELTASONE) 20 MG tablet Take 1 tablet (20 mg total) by mouth 2 (two) times daily. (Patient not taking: Reported on 1/31/2024) 10 tablet 0     No current facility-administered medications for this visit.        History  Past Medical History:   Diagnosis Date    AAA (abdominal aortic aneurysm) without rupture     pt with h/o aaa     Acute coronary syndrome     Cancer     bladder    Carotid artery occlusion     COPD (chronic obstructive pulmonary disease)     Coronary artery disease     Enlarged prostate     History of bladder cancer     Hyperlipidemia     Hypertension     Lung abnormality     spot on lung under care of pulmonologist at [facility] Dr lua    Multiple thyroid nodules 1/31/2023    Myocardial infarct 1988    Urine retention      Past Surgical History:   Procedure Laterality Date    ABDOMINAL AORTIC ANEURYSM REPAIR  2007    BLADDER SURGERY      CARDIAC CATHETERIZATION      CORONARY ANGIOPLASTY      CORONARY STENT PLACEMENT      FRACTURE SURGERY      arm    PROSTATE SURGERY      ROBOTIC BRONCHOSCOPY N/A 5/31/2022    Procedure: ROBOTIC BRONCHOSCOPY;  Surgeon: Mickey Acosta MD;  Location: [facility] OR [address] [place] [address];  Service: Pulmonary;  Laterality: N/A;    TONSILLECTOMY       Social History     Socioeconomic History    Marital status:    Occupational History    Occupation: retired    Tobacco Use     Smoking status: Every Day     Current packs/day: 0.50     Average packs/day: 0.5 packs/day for 60.0 years (30.0 ttl pk-yrs)     Types: Cigarettes    Smokeless tobacco: Never   Substance and Sexual Activity    Alcohol use: Yes     Alcohol/week: 1.0 standard drink of alcohol     Types: 1 Shots of liquor per week     Comment:  five drink per week     Drug use: No    Sexual activity: Yes     Partners: Female     Birth control/protection: None     Family History   Problem Relation Age of Onset    Cancer Father         leukemia    Clotting disorder Mother         Allergies  Review of patient's allergies indicates:  No Known Allergies    Review of Systems   Review of Systems   Constitutional: Negative for fever.   HENT:  Negative for nosebleeds.    Eyes:  Negative for visual disturbance.   Cardiovascular:  Negative for chest pain, claudication, dyspnea on exertion, palpitations and syncope.   Respiratory:  Negative for cough, hemoptysis and wheezing.    Endocrine: Negative for cold intolerance, heat intolerance, polyphagia and polyuria.   Hematologic/Lymphatic: Negative for bleeding problem.   Skin:  Negative for rash.   Musculoskeletal:  Negative for myalgias.   Gastrointestinal:  Negative for hematemesis, hematochezia, nausea and vomiting.   Genitourinary:  Negative for dysuria.   Neurological:  Negative for focal weakness and sensory change.   Psychiatric/Behavioral:  Negative for altered mental status.        Physical Exam  Vitals:    04/04/24 0958   BP: 126/62   Pulse: (!) 53     Wt Readings from Last 1 Encounters:   04/04/24 62.4 kg (137 lb 9.1 oz)     Physical Exam  HENT:      Head: Normocephalic and atraumatic.      Mouth/Throat:      Mouth: Mucous membranes are moist.   Eyes:      Extraocular Movements: Extraocular movements intact.      Pupils: Pupils are equal, round, and reactive to light.   Neck:      Vascular: No carotid bruit or JVD.   Cardiovascular:      Rate and Rhythm: Normal rate and regular rhythm.       Pulses: Normal pulses and intact distal pulses.      Heart sounds: Normal heart sounds. No murmur heard.     No friction rub. No gallop.   Pulmonary:      Effort: Pulmonary effort is normal.      Breath sounds: Normal breath sounds.   Abdominal:      Tenderness: There is no abdominal tenderness. There is no guarding or rebound.   Musculoskeletal:      Right lower leg: No edema.      Left lower leg: No edema.   Skin:     General: Skin is warm and dry.      Capillary Refill: Capillary refill takes less than 2 seconds.   Neurological:      General: No focal deficit present.      Mental Status: He is alert and oriented to person, place, and time.   Psychiatric:         Mood and Affect: Mood normal.         Labs  Procedure visit on 01/24/2024   Component Date Value Ref Range Status    Color, UA 01/24/2024 Yellow   Final    pH, UA 01/24/2024 6.0   Final    WBC, UA 01/24/2024 1   Final    Nitrite, UA 01/24/2024 Negative   Final    Protein, POC 01/24/2024 1   Final    Glucose, UA 01/24/2024 Negative   Final    Ketones, UA 01/24/2024 Negative   Final    Urobilinogen, UA 01/24/2024 Normal   Final    Bilirubin, POC 01/24/2024 Negative   Final    Blood, UA 01/24/2024 3+   Final    Clarity, UA 01/24/2024 Cloudy   Final    Spec Grav UA 01/24/2024 1.015   Final   Lab Visit on 01/10/2024   Component Date Value Ref Range Status    Creatinine 01/10/2024 0.9  0.5 - 1.4 mg/dL Final    eGFR 01/10/2024 >60.0  >60 mL/min/1.73 m^2 Final   Office Visit on 01/02/2024   Component Date Value Ref Range Status    Color, UA 01/02/2024 Yellow   Final    pH, UA 01/02/2024 6.5   Final    WBC, UA 01/02/2024 1   Final    Nitrite, UA 01/02/2024 Negative   Final    Protein, POC 01/02/2024 1   Final    Glucose, UA 01/02/2024 Negative   Final    Ketones, UA 01/02/2024 Negative   Final    Urobilinogen, UA 01/02/2024 Normal   Final    Bilirubin, POC 01/02/2024 Negative   Final    Blood, UA 01/02/2024 3+   Final    Clarity, UA 01/02/2024 Cloudy   Final     Spec Grav UA 01/02/2024 1.015   Final    Urine Culture, Routine 01/02/2024 Multiple organisms isolated. None in predominance.  Repeat if   Final    Urine Culture, Routine 01/02/2024 clinically necessary.   Final       EKG  No new tracing    Echo   Results for orders placed or performed during the hospital encounter of 11/03/21   Echo Saline Bubble? No   Result Value Ref Range    Ao peak maykel 1.53 m/s    IVS 1.30 (A) 0.6 - 1.1 cm    LA size 2.97 cm    LVIDd 5.16 3.5 - 6.0 cm    LVIDs 3.44 2.1 - 4.0 cm    LVOT diameter 1.71 cm    LVOT peak VTI 26.03 cm    Posterior Wall 1.30 0.6 - 1.1 cm    MV Peak A Maykel 0.98 m/s    E wave deceleration time 399.02 msec    MV Peak E Maykel 0.55 m/s    RVDD 2.68 cm    TR Max Maykel 2.56 m/s    Ao root annulus 3.89 cm    AORTIC VALVE CUSP SEPERATION 1.85 cm    PV PEAK VELOCITY 0.96 cm/s    MV stenosis pressure 1/2 time 115.72 ms    LV Diastolic Volume 127.41 mL    LV Systolic Volume 48.65 mL    LVOT peak maykel 1.06 m/s    TDI LATERAL 0.07 m/s    TDI SEPTAL 0.06 m/s    LV LATERAL E/E' RATIO 7.86 m/s    LV SEPTAL E/E' RATIO 9.17 m/s    FS 33 %    LV mass 275.08 g    Left Ventricle Relative Wall Thickness 0.50 cm    AV Velocity Ratio 0.69     MV valve area p 1/2 method 1.90 cm2    E/A ratio 0.56     Mean e' 0.07 m/s    LVOT area 2.3 cm2    LVOT stroke volume 59.75 cm3    AV peak gradient 9 mmHg    E/E' ratio 8.46 m/s    Triscuspid Valve Regurgitation Peak Gradient 26 mmHg    Right Atrial Pressure (from IVC) 3 mmHg    EF 45 %    TV resting pulmonary artery pressure 29 mmHg    Narrative    · The left ventricle is normal in size with mild concentric hypertrophy   and mildly decreased systolic function.  · The estimated ejection fraction is 45%.  · Grade I left ventricular diastolic dysfunction.  · There are segmental left ventricular wall motion abnormalities.  · Normal central venous pressure (3 mmHg).  · The estimated PA systolic pressure is 29 mmHg.  · Mild tricuspid regurgitation.  · Normal right  ventricular size with normal right ventricular systolic   function.          Imaging  X-Ray Chest PA And Lateral    Result Date: 3/7/2024  EXAMINATION: XR CHEST PA AND LATERAL CLINICAL HISTORY: Acute cough TECHNIQUE: PA and lateral views of the chest were performed. COMPARISON: 06/05/2023 FINDINGS: No new lung opacity.  Patchy lucencies throughout the lungs with hyperaeration and flattening hemidiaphragms concerning for emphysematous change.  There is no lung consolidation.  Probable biapical pleural thickening similar to prior.  No pleural effusion or pneumothorax.  Heart size within normal limits.  Continued atherosclerotic aorta.  Visualized osseous structures grossly intact.  Further evaluation as warranted..     See above Electronically signed by: Koffi Vo DO Date:    03/07/2024 Time:    10:19      Prior coronary angiogram / intervention:  No records    Assessment and Plan  1. Hypercholesteremia  The patient is on high-dose statin    2. Primary hypertension  Controlled on atenolol    3. History of endovascular stent graft for abdominal aortic aneurysm (AAA)  Repeat abdominal aortic ultrasound and refer to vascular surgery if indicated    4. PVD (peripheral vascular disease)  Medically managed with antithrombotic and high-dose statin no significant claudication    5. Carotid stenosis, asymptomatic, bilateral  Not hemodynamically significant    6. RBBB  Noted    7. Coronary artery disease involving native coronary artery of native heart without angina pectoris  Dual antiplatelet therapy with high-dose statin beta-blocker    8. S/P PTCA (percutaneous transluminal coronary angioplasty)  As above  May discontinue Plavix if necessary but need to continue aspirin 81        Follow Up  Follow up in about 6 months (around 10/4/2024).      Alistair Bryant MD, FACC, RPVI  Interventional Cardiology     Total professional time spent for the encounter: 45 minutes  Time was spent preparing to see the patient, reviewing  results of prior testing, obtaining and/or reviewing separately obtained history, performing a medically appropriate examination and interview, counseling and educating the patient/family, ordering medications/tests/procedures, referring and communicating with other health care professionals, documenting clinical information in the electronic health record, and independently interpreting results.

## 2024-04-24 PROBLEM — I73.9 PERIPHERAL VASCULAR DISEASE: Status: ACTIVE | Noted: 2024-04-24

## 2024-04-24 PROBLEM — I71.40 ABDOMINAL AORTIC ANEURYSM (AAA): Status: ACTIVE | Noted: 2024-04-24

## 2024-04-24 PROBLEM — R91.1 LUNG NODULE: Status: ACTIVE | Noted: 2024-04-24

## 2024-04-26 ENCOUNTER — HOSPITAL ENCOUNTER (OUTPATIENT)
Dept: RADIOLOGY | Facility: HOSPITAL | Age: 84
Discharge: HOME | End: 2024-04-26
Attending: RADIOLOGY
Payer: MEDICARE

## 2024-04-26 ENCOUNTER — OFFICE VISIT (OUTPATIENT)
Dept: RADIATION ONCOLOGY | Facility: CLINIC | Age: 84
End: 2024-04-26
Payer: MEDICARE

## 2024-04-26 VITALS
HEIGHT: 67 IN | HEART RATE: 59 BPM | SYSTOLIC BLOOD PRESSURE: 118 MMHG | BODY MASS INDEX: 21.66 KG/M2 | OXYGEN SATURATION: 97 % | DIASTOLIC BLOOD PRESSURE: 76 MMHG | WEIGHT: 138 LBS

## 2024-04-26 DIAGNOSIS — Z85.118 PERSONAL HISTORY OF LUNG CANCER: ICD-10-CM

## 2024-04-26 DIAGNOSIS — Z85.118 PERSONAL HISTORY OF LUNG CANCER: Primary | ICD-10-CM

## 2024-04-26 PROCEDURE — 3074F SYST BP LT 130 MM HG: CPT | Mod: CPTII,S$GLB,, | Performed by: RADIOLOGY

## 2024-04-26 PROCEDURE — 1126F AMNT PAIN NOTED NONE PRSNT: CPT | Mod: CPTII,S$GLB,, | Performed by: RADIOLOGY

## 2024-04-26 PROCEDURE — 99999 PR PBB SHADOW E&M-EST. PATIENT-LVL IV: CPT | Mod: PBBFAC,,, | Performed by: RADIOLOGY

## 2024-04-26 PROCEDURE — 3288F FALL RISK ASSESSMENT DOCD: CPT | Mod: CPTII,S$GLB,, | Performed by: RADIOLOGY

## 2024-04-26 PROCEDURE — 3078F DIAST BP <80 MM HG: CPT | Mod: CPTII,S$GLB,, | Performed by: RADIOLOGY

## 2024-04-26 PROCEDURE — 1101F PT FALLS ASSESS-DOCD LE1/YR: CPT | Mod: CPTII,S$GLB,, | Performed by: RADIOLOGY

## 2024-04-26 PROCEDURE — 71250 CT THORAX DX C-: CPT | Mod: TC

## 2024-04-26 PROCEDURE — 1157F ADVNC CARE PLAN IN RCRD: CPT | Mod: CPTII,S$GLB,, | Performed by: RADIOLOGY

## 2024-04-26 PROCEDURE — 99213 OFFICE O/P EST LOW 20 MIN: CPT | Mod: S$GLB,,, | Performed by: RADIOLOGY

## 2024-04-26 PROCEDURE — 71250 CT THORAX DX C-: CPT | Mod: 26,,, | Performed by: RADIOLOGY

## 2024-04-26 PROCEDURE — 1159F MED LIST DOCD IN RCRD: CPT | Mod: CPTII,S$GLB,, | Performed by: RADIOLOGY

## 2024-04-26 NOTE — PROGRESS NOTES
4/26/2024    Radiation Oncology Follow-Up Visit      Prior Radiation History:    Site  Technique  Energy  Dose/Fx (Gy)  #Fx  Total Dose (Gy)  Start Date  End Date  Elapsed Days    RUL Lung  SBRT  6X  10  5 / 5  50  6/28/2022 7/5/2022  7        Is the patient female between ages 15-55:  no    Does the patient have a CIED:  no      Assessment   This is an 83 y.o. male with Stage IA2 (cT1b cN0 M0) RUL NSCLC (adeno) diagnosed on robotic bronch w/ EBUS 5/31/22. PET/CT 5/9/22 demonstrated uptake in RUL primary only. He completed definitive SBRT 50 Gy in 5 fx on 7/5/22.    No significant late toxicity from treatment. CT Chest today 4/26/24 demonstrates stable post-radiation changes in RUL without evidence of new/recurrent disease by my review.           Plan   1) I will see him back in 6 months with CT Chest prior for re-staging.          CHIEF COMPLAINT: F/U after lung SBRT    HPI: No major medical changes since last visit. He feels well and denies significant dyspnea. No chest pain.       Past Medical History:   Diagnosis Date    AAA (abdominal aortic aneurysm) without rupture     pt with h/o aaa     Acute coronary syndrome     Cancer     bladder    Carotid artery occlusion     COPD (chronic obstructive pulmonary disease)     Coronary artery disease     Enlarged prostate     History of bladder cancer     Hyperlipidemia     Hypertension     Lung abnormality     spot on lung under care of pulmonologist at [facility] Dr lua    Multiple thyroid nodules 1/31/2023    Myocardial infarct 1988    Urine retention        Past Surgical History:   Procedure Laterality Date    ABDOMINAL AORTIC ANEURYSM REPAIR  2007    BLADDER SURGERY      CARDIAC CATHETERIZATION      CORONARY ANGIOPLASTY      CORONARY STENT PLACEMENT      FRACTURE SURGERY      arm    PROSTATE SURGERY      ROBOTIC BRONCHOSCOPY N/A 5/31/2022    Procedure: ROBOTIC BRONCHOSCOPY;  Surgeon: Mickey Acosta MD;  Location: [facility] OR [address] [place] [address];  Service: Pulmonary;   Laterality: N/A;    TONSILLECTOMY         Social History     Tobacco Use    Smoking status: Every Day     Current packs/day: 0.50     Average packs/day: 0.5 packs/day for 60.0 years (30.0 ttl pk-yrs)     Types: Cigarettes    Smokeless tobacco: Never   Substance Use Topics    Alcohol use: Yes     Alcohol/week: 1.0 standard drink of alcohol     Types: 1 Shots of liquor per week     Comment:  five drink per week     Drug use: No       Cancer-related family history includes Cancer in his father.    Current Outpatient Medications on File Prior to Visit   Medication Sig Dispense Refill    albuterol (PROVENTIL/VENTOLIN HFA) 90 mcg/actuation inhaler Inhale 2 puffs into the lungs every 6 (six) hours as needed for Wheezing. Rescue 17 g 3    ascorbic acid, vitamin C, (VITAMIN C) 500 MG tablet Take 500 mg by mouth once daily.      aspirin (ECOTRIN) 81 MG EC tablet Take 81 mg by mouth once daily.      atenoloL (TENORMIN) 25 MG tablet Take 1 tablet (25 mg total) by mouth once daily. 90 tablet 0    atorvastatin (LIPITOR) 40 MG tablet Take 1 tablet (40 mg total) by mouth once daily. 90 tablet 0    clopidogreL (PLAVIX) 75 mg tablet Take 1 tablet (75 mg total) by mouth once daily. 90 tablet 0    clotrimazole-betamethasone 1-0.05% (LOTRISONE) cream Apply topically 2 (two) times daily. 1 each 6    ergocalciferol (ERGOCALCIFEROL) 50,000 unit Cap Take 1 capsule (50,000 Units total) by mouth every 7 days. 12 capsule 0    finasteride (PROSCAR) 5 mg tablet Take 1 tablet (5 mg total) by mouth once daily. 90 tablet 3    levothyroxine (SYNTHROID) 25 MCG tablet One tab po daily before breakfast 90 tablet 0    nitrofurantoin, macrocrystal-monohydrate, (MACROBID) 100 MG capsule Take 1 capsule (100 mg total) by mouth 2 (two) times daily. 14 capsule 0    potassium chloride (KLOR-CON) 10 MEQ TbSR Take 1 tablet (10 mEq total) by mouth once daily. 90 tablet 0    predniSONE (DELTASONE) 20 MG tablet Take 1 tablet (20 mg total) by mouth 2 (two) times  "daily. 10 tablet 0    tamsulosin (FLOMAX) 0.4 mg Cap Take 1 capsule (0.4 mg total) by mouth once daily. 90 capsule 0    vitamins A,C,E-zinc-copper (ICAPS AREDS) 4,296 mcg-226 mg-90 mg Cap Take 1 capsule by mouth 2 (two) times a day. 180 capsule 0    betamethasone dipropionate 0.05 % cream Apply topically 2 (two) times daily. (Patient not taking: Reported on 9/26/2023) 45 g 1     No current facility-administered medications on file prior to visit.       Review of patient's allergies indicates:  No Known Allergies      Vital Signs: /76   Pulse (!) 59   Ht 5' 7" (1.702 m)   Wt 62.6 kg (138 lb 0.1 oz)   SpO2 97%   BMI 21.62 kg/m²     ECOG Performance Status: 2    Physical Exam  Vitals reviewed.   Constitutional:       Appearance: Normal appearance.   HENT:      Head: Normocephalic and atraumatic.   Eyes:      General: No scleral icterus.     Extraocular Movements: Extraocular movements intact.   Pulmonary:      Effort: Pulmonary effort is normal. No respiratory distress.   Abdominal:      General: There is no distension.   Musculoskeletal:      Cervical back: Neck supple.   Lymphadenopathy:      Cervical: No cervical adenopathy.   Skin:     General: Skin is warm and dry.   Neurological:      General: No focal deficit present.      Mental Status: He is alert and oriented to person, place, and time.      Cranial Nerves: No cranial nerve deficit.   Psychiatric:         Mood and Affect: Mood normal.         Behavior: Behavior normal.         Judgment: Judgment normal.          Labs:    Imaging: I have personally reviewed the patient's available images and reports and summarized pertinent findings above in HPI.     Pathology: No new path          "

## 2024-06-06 ENCOUNTER — TELEPHONE (OUTPATIENT)
Dept: PULMONOLOGY | Facility: CLINIC | Age: 84
End: 2024-06-06
Payer: MEDICARE

## 2024-06-06 NOTE — TELEPHONE ENCOUNTER
----- Message from Ivette Feliciano sent at 6/6/2024  8:47 AM CDT -----  Contact: pt @ [phone]  Martell Whitaker calling regarding Orders (message) for #pt is calling to speak with nurse concerning up coming appt, asking for call back

## 2024-06-06 NOTE — TELEPHONE ENCOUNTER
I returned call, phone message put in incorrect chart, call was in reference to this patient's friend that is coming for an appointment.

## 2024-06-12 DIAGNOSIS — Z20.1 EXPOSURE TO TB: Primary | ICD-10-CM

## 2024-07-30 ENCOUNTER — TELEPHONE (OUTPATIENT)
Dept: OPHTHALMOLOGY | Facility: CLINIC | Age: 84
End: 2024-07-30
Payer: MEDICARE

## 2024-07-30 NOTE — TELEPHONE ENCOUNTER
----- Message from Lucio Choi sent at 7/30/2024  9:02 AM CDT -----  Good morning,     The pt listed above is being referred from [facility] for (Trigeminal herpes zoster). This is an internal referral. Please advise or contact pt to schedule appt at your earliest convenience.     Thank You,     [facility] Choi  [facility]

## 2024-08-07 ENCOUNTER — OFFICE VISIT (OUTPATIENT)
Dept: UROLOGY | Facility: CLINIC | Age: 84
End: 2024-08-07
Payer: MEDICARE

## 2024-08-07 VITALS
SYSTOLIC BLOOD PRESSURE: 126 MMHG | HEART RATE: 65 BPM | WEIGHT: 133.69 LBS | DIASTOLIC BLOOD PRESSURE: 60 MMHG | BODY MASS INDEX: 20.94 KG/M2

## 2024-08-07 DIAGNOSIS — R31.0 GROSS HEMATURIA: Primary | ICD-10-CM

## 2024-08-07 PROBLEM — R31.9 HEMATURIA: Status: ACTIVE | Noted: 2024-08-07

## 2024-08-07 PROBLEM — N39.0 URINARY TRACT INFECTION WITH HEMATURIA: Status: ACTIVE | Noted: 2024-08-07

## 2024-08-07 PROBLEM — R31.9 URINARY TRACT INFECTION WITH HEMATURIA: Status: ACTIVE | Noted: 2024-08-07

## 2024-08-07 PROBLEM — R33.8 CLOT RETENTION OF URINE: Status: ACTIVE | Noted: 2024-08-07

## 2024-08-07 PROCEDURE — 99499 UNLISTED E&M SERVICE: CPT | Mod: S$GLB,,, | Performed by: NURSE PRACTITIONER

## 2024-08-07 PROCEDURE — 99999 PR PBB SHADOW E&M-EST. PATIENT-LVL III: CPT | Mod: PBBFAC,,, | Performed by: NURSE PRACTITIONER

## 2024-08-08 ENCOUNTER — TELEPHONE (OUTPATIENT)
Dept: UROLOGY | Facility: CLINIC | Age: 84
End: 2024-08-08
Payer: MEDICARE

## 2024-08-09 ENCOUNTER — OFFICE VISIT (OUTPATIENT)
Dept: UROLOGY | Facility: CLINIC | Age: 84
End: 2024-08-09
Payer: MEDICARE

## 2024-08-09 VITALS
HEART RATE: 70 BPM | SYSTOLIC BLOOD PRESSURE: 95 MMHG | WEIGHT: 132.94 LBS | BODY MASS INDEX: 20.82 KG/M2 | DIASTOLIC BLOOD PRESSURE: 54 MMHG

## 2024-08-09 DIAGNOSIS — R33.9 URINE RETENTION: Primary | ICD-10-CM

## 2024-08-09 DIAGNOSIS — R31.0 GROSS HEMATURIA: ICD-10-CM

## 2024-08-09 PROCEDURE — 99999 PR PBB SHADOW E&M-EST. PATIENT-LVL IV: CPT | Mod: PBBFAC,,, | Performed by: NURSE PRACTITIONER

## 2024-08-09 PROCEDURE — 99214 OFFICE O/P EST MOD 30 MIN: CPT | Mod: 25,S$GLB,, | Performed by: NURSE PRACTITIONER

## 2024-08-09 PROCEDURE — 87086 URINE CULTURE/COLONY COUNT: CPT | Performed by: NURSE PRACTITIONER

## 2024-08-09 PROCEDURE — 1159F MED LIST DOCD IN RCRD: CPT | Mod: CPTII,S$GLB,, | Performed by: NURSE PRACTITIONER

## 2024-08-09 PROCEDURE — 3074F SYST BP LT 130 MM HG: CPT | Mod: CPTII,S$GLB,, | Performed by: NURSE PRACTITIONER

## 2024-08-09 PROCEDURE — 1157F ADVNC CARE PLAN IN RCRD: CPT | Mod: CPTII,S$GLB,, | Performed by: NURSE PRACTITIONER

## 2024-08-09 PROCEDURE — 3288F FALL RISK ASSESSMENT DOCD: CPT | Mod: CPTII,S$GLB,, | Performed by: NURSE PRACTITIONER

## 2024-08-09 PROCEDURE — 1125F AMNT PAIN NOTED PAIN PRSNT: CPT | Mod: CPTII,S$GLB,, | Performed by: NURSE PRACTITIONER

## 2024-08-09 PROCEDURE — 1160F RVW MEDS BY RX/DR IN RCRD: CPT | Mod: CPTII,S$GLB,, | Performed by: NURSE PRACTITIONER

## 2024-08-09 PROCEDURE — 1101F PT FALLS ASSESS-DOCD LE1/YR: CPT | Mod: CPTII,S$GLB,, | Performed by: NURSE PRACTITIONER

## 2024-08-09 PROCEDURE — 3078F DIAST BP <80 MM HG: CPT | Mod: CPTII,S$GLB,, | Performed by: NURSE PRACTITIONER

## 2024-08-09 PROCEDURE — 51700 IRRIGATION OF BLADDER: CPT | Mod: S$GLB,,, | Performed by: NURSE PRACTITIONER

## 2024-08-09 NOTE — PROGRESS NOTES
Subjective:      Martell Whitaker is a 83 y.o. male who returns today regarding his GH.    S/p negative hematuria workup January 2024; WNL, +phimosis  Taking Flomax and finasteride daily  He presented to the emergency department on 08/09 with complaint suprapubic pain and gross hematuria.  CT RSS negative for stones, hydro, masses.  Did show distended bladder with hypoattenuating material  Melgar catheter in place draining red urine  Instructed by the emergency room to hold Plavix and ASA  Denies fever/chills/flank pain.  Denies issues with catheter    The following portions of the patient's history were reviewed and updated as appropriate: allergies, current medications, past family history, past medical history, past social history, past surgical history and problem list.    Review of Systems  A comprehensive multipoint review of systems was negative except as otherwise stated in the HPI.     Objective:   Vitals: BP (!) 95/54 (BP Location: Left arm, Patient Position: Sitting, BP Method: Medium (Automatic))   Pulse 70   Wt 60.3 kg (132 lb 15 oz)   BMI 20.82 kg/m²     Physical Exam   General: alert and oriented, no acute distress  Respiratory: Symmetric expansion, non-labored breathing  Cardiovascular: regular rate and rhythm, no peripheral edema  Abdomen: soft, non distended  Genitourinary: melgar catheter in place  Skin: normal coloration and turgor, no rashes, no suspicious skin lesions noted  Neuro: no gross deficits  Psych: normal judgment and insight, normal mood/affect, and non-anxious    Lab Review   Urinalysis demonstrates mlegar   Lab Results   Component Value Date    WBC 15.11 (H) 08/07/2024    HGB 13.7 (L) 08/07/2024    HCT 42.5 08/07/2024    MCV 99 (H) 08/07/2024     08/07/2024     Lab Results   Component Value Date    CREATININE 1.1 08/07/2024    BUN 22 08/07/2024     Lab Results   Component Value Date    PSA 1.4 03/04/2019    PSADIAG 1.22 12/20/2022       Imaging   CT RENAL STONE STUDY ABD  PELVIS WO     CLINICAL HISTORY:  Flank pain, kidney stone suspected;     TECHNIQUE:  Low dose axial images, sagittal and coronal reformations were obtained from the lung bases to the pubic symphysis.  Contrast was not administered.     COMPARISON:  CT of the abdomen and pelvis performed 01/10/2024.     FINDINGS:  This examination is limited due to lack of intravenous contrast.     Lower chest: Nonspecific bronchial wall thickening at the lung bases.  Areas of atelectasis and/or scarring.  Coronary artery calcifications and/or stenting.     Liver: Normal contour.  Several fluid attenuation lesions are noted in the liver which would be in keeping cysts.  Additional subcentimeter hypodense lesions are too small to definitely characterize.     Gallbladder and bile ducts: Unremarkable.     Pancreas: Normal contour.     Spleen: Suspect calcified granuloma.     Adrenals: Nodularity of the adrenal glands, grossly similar to 01/10/2024 in more remote study performed 01/06/2020     Kidneys: Cortical atrophy of both kidneys.  Nonspecific perinephric stranding.  Fluid attenuation simple cysts are seen bilaterally.  Additional subcentimeter hypodense lesions are too small to definitely characterize.  Several small nonobstructing renal calculi are noted in the left upper pole.     Lymph nodes: No abdominal or pelvic lymphadenopathy.     Bowel and mesentery: Small hiatal hernia.  No evidence of bowel obstruction.  Moderate colonic stool burden.  Normal appendix.     Abdominal aorta: Aorta bi-iliac stent grafting.  No acute appearing periaortic inflammatory change.  Underlying aortoiliac atherosclerotic calcifications.  Operative sequela noted in the right inguinal region centered about the femoral vasculature.     Inferior vena cava: Unremarkable.     Free fluid or free air: None.     Pelvis: Enlarged prostate gland with dystrophic calcifications.     Urinary bladder: Urinary bladder is distended with dependent lobular  hyperattenuating material no significant wall thickening is appreciated.     Body wall: Moderate fat containing umbilical hernia.  Small fat containing bilateral inguinal hernias.     Bones: Osseous demineralization.  Suspected perineural root sleeve cyst is noted in the S2 sacral level.  Chronic appearing scalloping of the L1 and L2 superior endplates, unchanged.     Impression:     1. Urinary bladder is distended with intraluminal lobulated hyperattenuating material dependently, which would be keeping with blood products/clot in light of patient history.  An underlying soft tissue mass would be difficult to exclude.  Follow-up with urology would be recommended.  2. Small nonobstructing left renal calculi.  No evidence of obstructive uropathy at the present time.  3. Additional details of chronic and incidental findings, as provided in the body of report.        Electronically signed by:Sam Diana  Date:                                            08/07/2024  Time:                                           13:14    Assessment and Plan:   1. Urine retention  2. Gross hematuria  - Urine culture     --Denny catheter irrigated to pink tinge; will leave Denny catheter in place until next week.  --return to clinic in 1 week for voiding trial    This note is dictated on M*Modal word recognition program.  There are word recognition mistakes that are occasionally missed on review.

## 2024-08-12 LAB — BACTERIA UR CULT: NO GROWTH

## 2024-08-13 ENCOUNTER — OFFICE VISIT (OUTPATIENT)
Dept: UROLOGY | Facility: CLINIC | Age: 84
End: 2024-08-13
Payer: MEDICARE

## 2024-08-13 VITALS
DIASTOLIC BLOOD PRESSURE: 57 MMHG | BODY MASS INDEX: 20.57 KG/M2 | SYSTOLIC BLOOD PRESSURE: 118 MMHG | HEIGHT: 67 IN | HEART RATE: 59 BPM | WEIGHT: 131.06 LBS

## 2024-08-13 DIAGNOSIS — N47.1 PHIMOSIS: ICD-10-CM

## 2024-08-13 DIAGNOSIS — R31.0 GROSS HEMATURIA: ICD-10-CM

## 2024-08-13 DIAGNOSIS — R33.9 URINE RETENTION: Primary | ICD-10-CM

## 2024-08-13 PROCEDURE — 99999 PR PBB SHADOW E&M-EST. PATIENT-LVL IV: CPT | Mod: PBBFAC,,, | Performed by: NURSE PRACTITIONER

## 2024-08-13 NOTE — PROGRESS NOTES
"Subjective:      Martell Whitaker is a 83 y.o. male who presents for Voiding trial.    S/p negative hematuria workup January 2024; WNL, +phimosis  Taking Flomax and finasteride daily  He presented to the emergency department on 08/09 with complaint suprapubic pain and gross hematuria.  CT RSS negative for stones, hydro, masses.  Did show distended bladder with hypoattenuating material  Melgar catheter in place draining red urine  Instructed by the emergency room to hold Plavix and ASA  Denies fever/chills/flank pain.  Denies issues with catheter    The following portions of the patient's history were reviewed and updated as appropriate: allergies, current medications, past family history, past medical history, past social history, past surgical history and problem list.    Review of Systems  A comprehensive multipoint review of systems was negative except as otherwise stated in the HPI.     Objective:   Vitals: BP (!) 118/57 (BP Location: Left arm, Patient Position: Sitting, BP Method: Medium (Automatic))   Pulse (!) 59   Ht 5' 7" (1.702 m)   Wt 59.5 kg (131 lb 1 oz)   BMI 20.53 kg/m²     Physical Exam  General: alert and oriented, no acute distress  Head: normocephalic, atraumatic  Neck: supple, no lymphadenopathy, normal ROM, no masses  Respiratory: Symmetric expansion, non-labored breathing  Genitourinary: Mlegar catheter in place draining yellow urine to gravity   Psych: normal judgment and insight, normal mood/affect, and non-anxious    Lab Review   Urinalysis demonstrates melgar   Lab Results   Component Value Date    WBC 15.11 (H) 08/07/2024    HGB 13.7 (L) 08/07/2024    HCT 42.5 08/07/2024    MCV 99 (H) 08/07/2024     08/07/2024     Lab Results   Component Value Date    CREATININE 1.1 08/07/2024    BUN 22 08/07/2024     Lab Results   Component Value Date    PSA 1.4 03/04/2019       Voiding Trial (Fill/Pull/Void): 180 cc of sterile saline was instilled into the bladder through the previously placed " melgar catheter.  The melgar balloon was then deflated and the melgar removed.  The patient subsequently voided 150 cc,  consistent with successful voiding trial.  The melgar was not replaced.     Assessment and Plan:   1. Urine retention  2. Gross hematuria  3. Phimosis   --successful voiding trial today   --continue finasteride and Flomax   --notify office if gross hematuria recurs

## 2024-10-09 ENCOUNTER — OFFICE VISIT (OUTPATIENT)
Dept: CARDIOLOGY | Facility: CLINIC | Age: 84
End: 2024-10-09
Payer: MEDICARE

## 2024-10-09 VITALS
DIASTOLIC BLOOD PRESSURE: 68 MMHG | BODY MASS INDEX: 20.36 KG/M2 | SYSTOLIC BLOOD PRESSURE: 116 MMHG | OXYGEN SATURATION: 99 % | HEART RATE: 63 BPM | HEIGHT: 67 IN | WEIGHT: 129.75 LBS

## 2024-10-09 DIAGNOSIS — I73.9 CLAUDICATION: ICD-10-CM

## 2024-10-09 DIAGNOSIS — I73.9 PVD (PERIPHERAL VASCULAR DISEASE): ICD-10-CM

## 2024-10-09 DIAGNOSIS — I73.9 PERIPHERAL VASCULAR DISEASE: ICD-10-CM

## 2024-10-09 DIAGNOSIS — I25.10 CORONARY ARTERY DISEASE INVOLVING NATIVE CORONARY ARTERY OF NATIVE HEART WITHOUT ANGINA PECTORIS: Chronic | ICD-10-CM

## 2024-10-09 DIAGNOSIS — I10 PRIMARY HYPERTENSION: Chronic | ICD-10-CM

## 2024-10-09 DIAGNOSIS — Z98.61 S/P PTCA (PERCUTANEOUS TRANSLUMINAL CORONARY ANGIOPLASTY): Primary | ICD-10-CM

## 2024-10-09 DIAGNOSIS — I65.23 CAROTID STENOSIS, ASYMPTOMATIC, BILATERAL: ICD-10-CM

## 2024-10-09 DIAGNOSIS — E78.00 HYPERCHOLESTEREMIA: Chronic | ICD-10-CM

## 2024-10-09 DIAGNOSIS — I71.40 ABDOMINAL AORTIC ANEURYSM (AAA) WITHOUT RUPTURE, UNSPECIFIED PART: ICD-10-CM

## 2024-10-09 DIAGNOSIS — Z95.828 HISTORY OF ENDOVASCULAR STENT GRAFT FOR ABDOMINAL AORTIC ANEURYSM (AAA): ICD-10-CM

## 2024-10-09 DIAGNOSIS — I45.10 RBBB: ICD-10-CM

## 2024-10-09 DIAGNOSIS — I25.2 HX OF MYOCARDIAL INFARCTION: ICD-10-CM

## 2024-10-09 PROCEDURE — 99999 PR PBB SHADOW E&M-EST. PATIENT-LVL IV: CPT | Mod: PBBFAC,,, | Performed by: INTERNAL MEDICINE

## 2024-10-09 NOTE — PROGRESS NOTES
[facility] - Cardiology [address]  Cardiology Clinic Note      Chief Complaint  Chief Complaint   Patient presents with    Follow-up       HPI:    83-year-old male with past medical history thyroid disease, BPH with history of urinary retention, COPD, bladder cancer, non-small-cell lung cancer status post radiation, hypertension, hyperlipidemia, AAA status post EVAR no records, peripheral vascular disease right mid SFA ultrasound 2021, carotid stenosis not hemodynamically significant by ultrasound 2020, coronary artery disease status post MI and PCI to the RCA 1996, echo 2021 EF estimated 45% grade 1 diastolic dysfunction mild LVH akinetic basal inferior basal inferolateral mid inferior walls normal PASP normal RV normal CVP    Last visit repeated AAA screen not yet performed  Did have noncontrast CT abdomen pelvis  No cardiovascular symptoms    Medications  Current Outpatient Medications   Medication Sig Dispense Refill    acyclovir (ZOVIRAX) 400 MG tablet Take 0.5 tablets (200 mg total) by mouth 5 (five) times daily. (Patient not taking: Reported on 10/2/2024) 50 tablet 0    albuterol (PROVENTIL/VENTOLIN HFA) 90 mcg/actuation inhaler USE 2 INHALATIONS BY MOUTH EVERY 6 HOURS AS NEEDED FOR WHEEZING 17 g 6    ascorbic acid, vitamin C, (VITAMIN C) 500 MG tablet Take 500 mg by mouth once daily.      aspirin (ECOTRIN) 81 MG EC tablet Take 81 mg by mouth once daily.      atenoloL (TENORMIN) 25 MG tablet Take 1 tablet (25 mg total) by mouth once daily. 90 tablet 0    atorvastatin (LIPITOR) 40 MG tablet Take 1 tablet (40 mg total) by mouth once daily. 90 tablet 0    betamethasone dipropionate 0.05 % cream Apply topically 2 (two) times daily. 45 g 1    clopidogreL (PLAVIX) 75 mg tablet Take 1 tablet (75 mg total) by mouth once daily. 90 tablet 0    clotrimazole-betamethasone 1-0.05% (LOTRISONE) cream Apply topically 2 (two) times daily. (Patient not taking: Reported on 10/2/2024) 1 each 6    ergocalciferol (VITAMIN D2)  50,000 unit Cap TAKE 1 CAPSULE BY MOUTH EVERY 7  DAYS 12 capsule 1    finasteride (PROSCAR) 5 mg tablet Take 1 tablet (5 mg total) by mouth once daily. 90 tablet 3    gabapentin (NEURONTIN) 300 MG capsule Take 1 capsule (300 mg total) by mouth 3 (three) times daily. 90 capsule 0    levothyroxine (SYNTHROID) 25 MCG tablet TAKE 1 TABLET BY MOUTH DAILY  BEFORE BREAKFAST 90 tablet 0    megestroL (MEGACE) 40 MG Tab Take 1 tablet (40 mg total) by mouth once daily. (Patient not taking: Reported on 10/2/2024.) 90 tablet 0    potassium chloride (KLOR-CON) 10 MEQ TbSR Take 1 tablet (10 mEq total) by mouth once daily. 90 tablet 0    predniSONE (DELTASONE) 20 MG tablet Take 1 tablet (20 mg total) by mouth 2 (two) times daily. (Patient not taking: Reported on 10/2/2024) 10 tablet 0    tamsulosin (FLOMAX) 0.4 mg Cap Take 1 capsule (0.4 mg total) by mouth once daily. 90 capsule 0    vitamins A,C,E-zinc-copper (ICAPS AREDS) 4,296 mcg-226 mg-90 mg Cap Take 1 capsule by mouth 2 (two) times a day. (Patient not taking: Reported on 10/2/2024) 180 capsule 0     No current facility-administered medications for this visit.        History  Past Medical History:   Diagnosis Date    AAA (abdominal aortic aneurysm) without rupture     pt with h/o aaa     Acute coronary syndrome     Cancer     bladder    Carotid artery occlusion     COPD (chronic obstructive pulmonary disease)     Coronary artery disease     Enlarged prostate     History of bladder cancer     Hyperlipidemia     Hypertension     Lung abnormality     spot on lung under care of pulmonologist at [facility] Dr lua    Multiple thyroid nodules 1/31/2023    Myocardial infarct 1988    Urine retention      Past Surgical History:   Procedure Laterality Date    ABDOMINAL AORTIC ANEURYSM REPAIR  2007    BLADDER SURGERY      CARDIAC CATHETERIZATION      CORONARY ANGIOPLASTY      CORONARY STENT PLACEMENT      FRACTURE SURGERY      arm    PROSTATE SURGERY      ROBOTIC BRONCHOSCOPY N/A  5/31/2022    Procedure: ROBOTIC BRONCHOSCOPY;  Surgeon: Mickey Acosta MD;  Location: [facility] OR [address] [place] [address];  Service: Pulmonary;  Laterality: N/A;    TONSILLECTOMY       Social History     Socioeconomic History    Marital status:    Occupational History    Occupation: retired    Tobacco Use    Smoking status: Every Day     Current packs/day: 0.50     Average packs/day: 0.5 packs/day for 60.0 years (30.0 ttl pk-yrs)     Types: Cigarettes    Smokeless tobacco: Never   Substance and Sexual Activity    Alcohol use: Yes     Alcohol/week: 1.0 standard drink of alcohol     Types: 1 Shots of liquor per week     Comment:  five drink per week     Drug use: No    Sexual activity: Yes     Partners: Female     Birth control/protection: None     Family History   Problem Relation Name Age of Onset    Cancer Father          leukemia    Clotting disorder Mother          Allergies  Review of patient's allergies indicates:  No Known Allergies    Review of Systems   Review of Systems   Constitutional: Negative for fever.   HENT:  Negative for nosebleeds.    Eyes:  Negative for visual disturbance.   Cardiovascular:  Negative for chest pain, claudication, dyspnea on exertion, palpitations and syncope.   Respiratory:  Negative for cough, hemoptysis and wheezing.    Endocrine: Negative for cold intolerance, heat intolerance, polyphagia and polyuria.   Hematologic/Lymphatic: Negative for bleeding problem.   Skin:  Negative for rash.   Musculoskeletal:  Negative for myalgias.   Gastrointestinal:  Negative for hematemesis, hematochezia, nausea and vomiting.   Genitourinary:  Negative for dysuria.   Neurological:  Negative for focal weakness and sensory change.   Psychiatric/Behavioral:  Negative for altered mental status.        Physical Exam  There were no vitals filed for this visit.    Wt Readings from Last 1 Encounters:   10/09/24 58.8 kg (129 lb 11.9 oz)     Physical Exam  HENT:      Head: Normocephalic and atraumatic.       Mouth/Throat:      Mouth: Mucous membranes are moist.   Eyes:      Extraocular Movements: Extraocular movements intact.      Pupils: Pupils are equal, round, and reactive to light.   Neck:      Vascular: No carotid bruit or JVD.   Cardiovascular:      Rate and Rhythm: Normal rate and regular rhythm.      Pulses: Normal pulses and intact distal pulses.      Heart sounds: Normal heart sounds. No murmur heard.     No friction rub. No gallop.   Pulmonary:      Effort: Pulmonary effort is normal.      Breath sounds: Normal breath sounds.   Abdominal:      Tenderness: There is no abdominal tenderness. There is no guarding or rebound.   Musculoskeletal:      Right lower leg: No edema.      Left lower leg: No edema.   Skin:     General: Skin is warm and dry.      Capillary Refill: Capillary refill takes less than 2 seconds.   Neurological:      General: No focal deficit present.      Mental Status: He is alert and oriented to person, place, and time.   Psychiatric:         Mood and Affect: Mood normal.         Labs  Lab Visit on 10/03/2024   Component Date Value Ref Range Status    Hemoglobin A1C 10/03/2024 5.2  4.0 - 5.6 % Final    Comment: ADA Screening Guidelines:  5.7-6.4%  Consistent with prediabetes  >or=6.5%  Consistent with diabetes    High levels of fetal hemoglobin interfere with the HbA1C  assay. Heterozygous hemoglobin variants (HbS, HgC, etc)do  not significantly interfere with this assay.   However, presence of multiple variants may affect accuracy.      Estimated Avg Glucose 10/03/2024 103  68 - 131 mg/dL Final    WBC 10/03/2024 8.52  3.90 - 12.70 K/uL Final    RBC 10/03/2024 4.38 (L)  4.60 - 6.20 M/uL Final    Hemoglobin 10/03/2024 14.2  14.0 - 18.0 g/dL Final    Hematocrit 10/03/2024 44.1  40.0 - 54.0 % Final    MCV 10/03/2024 101 (H)  82 - 98 fL Final    MCH 10/03/2024 32.4 (H)  27.0 - 31.0 pg Final    MCHC 10/03/2024 32.2  32.0 - 36.0 g/dL Final    RDW 10/03/2024 13.2  11.5 - 14.5 % Final    Platelets  10/03/2024 183  150 - 450 K/uL Final    MPV 10/03/2024 9.3  9.2 - 12.9 fL Final    Sodium 10/03/2024 141  136 - 145 mmol/L Final    Potassium 10/03/2024 3.9  3.5 - 5.1 mmol/L Final    Chloride 10/03/2024 111 (H)  95 - 110 mmol/L Final    CO2 10/03/2024 22 (L)  23 - 29 mmol/L Final    Glucose 10/03/2024 115 (H)  70 - 110 mg/dL Final    BUN 10/03/2024 23  8 - 23 mg/dL Final    Creatinine 10/03/2024 1.0  0.5 - 1.4 mg/dL Final    Calcium 10/03/2024 10.0  8.7 - 10.5 mg/dL Final    Total Protein 10/03/2024 6.8  6.0 - 8.4 g/dL Final    Albumin 10/03/2024 3.8  3.5 - 5.2 g/dL Final    Total Bilirubin 10/03/2024 0.8  0.1 - 1.0 mg/dL Final    Comment: For infants and newborns, interpretation of results should be based  on gestational age, weight and in agreement with clinical  observations.    Premature Infant recommended reference ranges:  Up to 24 hours.............<8.0 mg/dL  Up to 48 hours............<12.0 mg/dL  3-5 days..................<15.0 mg/dL  6-29 days.................<15.0 mg/dL      Alkaline Phosphatase 10/03/2024 96  55 - 135 U/L Final    AST 10/03/2024 18  10 - 40 U/L Final    ALT 10/03/2024 18  10 - 44 U/L Final    eGFR 10/03/2024 >60.0  >60 mL/min/1.73 m^2 Final    Anion Gap 10/03/2024 8  8 - 16 mmol/L Final   Office Visit on 08/09/2024   Component Date Value Ref Range Status    Urine Culture, Routine 08/09/2024 No growth   Final   Admission on 08/07/2024, Discharged on 08/07/2024   Component Date Value Ref Range Status    WBC 08/07/2024 15.11 (H)  3.90 - 12.70 K/uL Final    RBC 08/07/2024 4.29 (L)  4.60 - 6.20 M/uL Final    Hemoglobin 08/07/2024 13.7 (L)  14.0 - 18.0 g/dL Final    Hematocrit 08/07/2024 42.5  40.0 - 54.0 % Final    MCV 08/07/2024 99 (H)  82 - 98 fL Final    MCH 08/07/2024 31.9 (H)  27.0 - 31.0 pg Final    MCHC 08/07/2024 32.2  32.0 - 36.0 g/dL Final    RDW 08/07/2024 13.5  11.5 - 14.5 % Final    Platelets 08/07/2024 236  150 - 450 K/uL Final    MPV 08/07/2024 9.5  9.2 - 12.9 fL Final     Immature Granulocytes 08/07/2024 0.6 (H)  0.0 - 0.5 % Final    Gran # (ANC) 08/07/2024 12.9 (H)  1.8 - 7.7 K/uL Final    Immature Grans (Abs) 08/07/2024 0.09 (H)  0.00 - 0.04 K/uL Final    Comment: Mild elevation in immature granulocytes is non specific and   can be seen in a variety of conditions including stress response,   acute inflammation, trauma and pregnancy. Correlation with other   laboratory and clinical findings is essential.      Lymph # 08/07/2024 1.2  1.0 - 4.8 K/uL Final    Mono # 08/07/2024 0.7  0.3 - 1.0 K/uL Final    Eos # 08/07/2024 0.1  0.0 - 0.5 K/uL Final    Baso # 08/07/2024 0.09  0.00 - 0.20 K/uL Final    nRBC 08/07/2024 0  0 /100 WBC Final    Gran % 08/07/2024 85.4 (H)  38.0 - 73.0 % Final    Lymph % 08/07/2024 8.1 (L)  18.0 - 48.0 % Final    Mono % 08/07/2024 4.8  4.0 - 15.0 % Final    Eosinophil % 08/07/2024 0.5  0.0 - 8.0 % Final    Basophil % 08/07/2024 0.6  0.0 - 1.9 % Final    Differential Method 08/07/2024 Automated   Final    Sodium 08/07/2024 142  136 - 145 mmol/L Final    Potassium 08/07/2024 4.3  3.5 - 5.1 mmol/L Final    Chloride 08/07/2024 108  95 - 110 mmol/L Final    CO2 08/07/2024 21 (L)  23 - 29 mmol/L Final    Glucose 08/07/2024 161 (H)  70 - 110 mg/dL Final    BUN 08/07/2024 22  8 - 23 mg/dL Final    Creatinine 08/07/2024 1.1  0.5 - 1.4 mg/dL Final    Calcium 08/07/2024 9.7  8.7 - 10.5 mg/dL Final    Total Protein 08/07/2024 6.9  6.0 - 8.4 g/dL Final    Albumin 08/07/2024 4.0  3.5 - 5.2 g/dL Final    Total Bilirubin 08/07/2024 0.9  0.1 - 1.0 mg/dL Final    Comment: For infants and newborns, interpretation of results should be based  on gestational age, weight and in agreement with clinical  observations.    Premature Infant recommended reference ranges:  Up to 24 hours.............<8.0 mg/dL  Up to 48 hours............<12.0 mg/dL  3-5 days..................<15.0 mg/dL  6-29 days.................<15.0 mg/dL      Alkaline Phosphatase 08/07/2024 103  55 - 135 U/L Final     AST 08/07/2024 18  10 - 40 U/L Final    ALT 08/07/2024 18  10 - 44 U/L Final    eGFR 08/07/2024 >60.0  >60 mL/min/1.73 m^2 Final    Anion Gap 08/07/2024 13  8 - 16 mmol/L Final    Specimen UA 08/07/2024 Urine, Catheterized   Final    Color, UA 08/07/2024 Red (A)  Yellow, Straw, Vidhya Final    Appearance, UA 08/07/2024 Cloudy (A)  Clear Final    pH, UA 08/07/2024 7.0  5.0 - 8.0 Final    Specific Gravity, UA 08/07/2024 1.025  1.005 - 1.030 Final    Protein, UA 08/07/2024 3+ (A)  Negative Final    Comment: Recommend a 24 hour urine protein or a urine   protein/creatinine ratio if globulin induced proteinuria is  clinically suspected.      Glucose, UA 08/07/2024 Negative  Negative Final    Ketones, UA 08/07/2024 Negative  Negative Final    Bilirubin (UA) 08/07/2024 Negative  Negative Final    Occult Blood UA 08/07/2024 3+ (A)  Negative Final    Nitrite, UA 08/07/2024 Negative  Negative Final    Urobilinogen, UA 08/07/2024 Negative  Negative EU/dL Final    Leukocytes, UA 08/07/2024 Trace (A)  Negative Final    RBC, UA 08/07/2024 >100 (H)  0 - 4 /hpf Final    WBC, UA 08/07/2024 0  0 - 5 /hpf Final    Bacteria 08/07/2024 None  None-Occ /hpf Final    Hyaline Casts, UA 08/07/2024 0  0-1/lpf /lpf Final    Microscopic Comment 08/07/2024 Chemistries were performed on spun urine   Final   Lab Visit on 06/14/2024   Component Date Value Ref Range Status    NIL 06/14/2024 0.76567  IU/mL Final    Comment: The Nil tube value is used to determine if the patient   has a preexisting immune response which could cause a   false-positive reading on the test.   For a test to be valid, the NIL tube must have a value   of less than or equal to 8.0 IU/mL.  The mitogen control tube is used to assure the patient   has a healthy immune status and also serves as a control   for correct blood handling and incubation. It is used to   detect false negative readings.   The TB antigen tubes are coated with the M. tuberculosis   specific antigens. For a  test to be considered positive,   at least one of the TB antigen tube values minus the Nil   tube value must be greater than or equal to 0.35 IU/mL   and be greater than or equal to 25% of the Nil tube value.  Diagnosing or excluding tuberculosis disease, and assessing   the probability of LTNI, requires a combination of   epidemiological, historical, medical, and diagnostic   findings that should be considered when interpreting   the test results.       TB1 - Nil 06/14/2024 0.034  IU/mL Final    TB2 - Nil 06/14/2024 0.009  IU/mL Final    Mitogen - Nil 06/14/2024 7.581  IU/mL Final    TB Gold Plus 06/14/2024 Negative  Negative Final    M. tuberculosis infection NOT likely.   Lab Visit on 04/19/2024   Component Date Value Ref Range Status    WBC 04/19/2024 6.68  3.90 - 12.70 K/uL Final    RBC 04/19/2024 4.80  4.60 - 6.20 M/uL Final    Hemoglobin 04/19/2024 15.2  14.0 - 18.0 g/dL Final    Hematocrit 04/19/2024 47.7  40.0 - 54.0 % Final    MCV 04/19/2024 99 (H)  82 - 98 fL Final    MCH 04/19/2024 31.7 (H)  27.0 - 31.0 pg Final    MCHC 04/19/2024 31.9 (L)  32.0 - 36.0 g/dL Final    RDW 04/19/2024 13.5  11.5 - 14.5 % Final    Platelets 04/19/2024 165  150 - 450 K/uL Final    MPV 04/19/2024 9.7  9.2 - 12.9 fL Final    Immature Granulocytes 04/19/2024 0.3  0.0 - 0.5 % Final    Gran # (ANC) 04/19/2024 4.5  1.8 - 7.7 K/uL Final    Immature Grans (Abs) 04/19/2024 0.02  0.00 - 0.04 K/uL Final    Comment: Mild elevation in immature granulocytes is non specific and   can be seen in a variety of conditions including stress response,   acute inflammation, trauma and pregnancy. Correlation with other   laboratory and clinical findings is essential.      Lymph # 04/19/2024 1.2  1.0 - 4.8 K/uL Final    Mono # 04/19/2024 0.6  0.3 - 1.0 K/uL Final    Eos # 04/19/2024 0.2  0.0 - 0.5 K/uL Final    Baso # 04/19/2024 0.06  0.00 - 0.20 K/uL Final    nRBC 04/19/2024 0  0 /100 WBC Final    Gran % 04/19/2024 67.9  38.0 - 73.0 % Final    Lymph  % 04/19/2024 18.3  18.0 - 48.0 % Final    Mono % 04/19/2024 9.0  4.0 - 15.0 % Final    Eosinophil % 04/19/2024 3.6  0.0 - 8.0 % Final    Basophil % 04/19/2024 0.9  0.0 - 1.9 % Final    Differential Method 04/19/2024 Automated   Final    Sodium 04/19/2024 141  136 - 145 mmol/L Final    Potassium 04/19/2024 3.8  3.5 - 5.1 mmol/L Final    Chloride 04/19/2024 107  95 - 110 mmol/L Final    CO2 04/19/2024 24  23 - 29 mmol/L Final    Glucose 04/19/2024 160 (H)  70 - 110 mg/dL Final    BUN 04/19/2024 21  8 - 23 mg/dL Final    Creatinine 04/19/2024 1.1  0.5 - 1.4 mg/dL Final    Calcium 04/19/2024 9.7  8.7 - 10.5 mg/dL Final    Total Protein 04/19/2024 6.7  6.0 - 8.4 g/dL Final    Albumin 04/19/2024 3.8  3.5 - 5.2 g/dL Final    Total Bilirubin 04/19/2024 0.9  0.1 - 1.0 mg/dL Final    Comment: For infants and newborns, interpretation of results should be based  on gestational age, weight and in agreement with clinical  observations.    Premature Infant recommended reference ranges:  Up to 24 hours.............<8.0 mg/dL  Up to 48 hours............<12.0 mg/dL  3-5 days..................<15.0 mg/dL  6-29 days.................<15.0 mg/dL      Alkaline Phosphatase 04/19/2024 102  55 - 135 U/L Final    AST 04/19/2024 20  10 - 40 U/L Final    ALT 04/19/2024 16  10 - 44 U/L Final    eGFR 04/19/2024 >60.0  >60 mL/min/1.73 m^2 Final    Anion Gap 04/19/2024 10  8 - 16 mmol/L Final    Cholesterol 04/19/2024 104 (L)  120 - 199 mg/dL Final    Comment: The National Cholesterol Education Program (NCEP) has set the  following guidelines (reference ranges) for Cholesterol:  Optimal.....................<200 mg/dL  Borderline High.............200-239 mg/dL  High........................> or = 240 mg/dL      Triglycerides 04/19/2024 122  30 - 150 mg/dL Final    Comment: The National Cholesterol Education Program (NCEP) has set the  following guidelines (reference values) for triglycerides:  Normal......................<150 mg/dL  Borderline  High.............150-199 mg/dL  High........................200-499 mg/dL      HDL 04/19/2024 43  40 - 75 mg/dL Final    Comment: The National Cholesterol Education Program (NCEP) has set the  following guidelines (reference values) for HDL Cholesterol:  Low...............<40 mg/dL  Optimal...........>60 mg/dL      LDL Cholesterol 04/19/2024 36.6 (L)  63.0 - 159.0 mg/dL Final    Comment: The National Cholesterol Education Program (NCEP) has set the  following guidelines (reference values) for LDL Cholesterol:  Optimal.......................<130 mg/dL  Borderline High...............130-159 mg/dL  High..........................160-189 mg/dL  Very High.....................>190 mg/dL      HDL/Cholesterol Ratio 04/19/2024 41.3  20.0 - 50.0 % Final    Total Cholesterol/HDL Ratio 04/19/2024 2.4  2.0 - 5.0 Final    Non-HDL Cholesterol 04/19/2024 61  mg/dL Final    Comment: Risk category and Non-HDL cholesterol goals:  Coronary heart disease (CHD)or equivalent (10-year risk of CHD >20%):  Non-HDL cholesterol goal     <130 mg/dL  Two or more CHD risk factors and 10-year risk of CHD <= 20%:  Non-HDL cholesterol goal     <160 mg/dL  0 to 1 CHD risk factor:  Non-HDL cholesterol goal     <190 mg/dL      TSH 04/19/2024 1.105  0.400 - 4.000 uIU/mL Final    Vit D, 25-Hydroxy 04/19/2024 25 (L)  30 - 96 ng/mL Final    Comment: Vitamin D deficiency.........<10 ng/mL                              Vitamin D insufficiency......10-29 ng/mL       Vitamin D sufficiency........> or equal to 30 ng/mL  Vitamin D toxicity............>100 ng/mL      Hemoglobin A1C 04/19/2024 5.4  4.0 - 5.6 % Final    Comment: ADA Screening Guidelines:  5.7-6.4%  Consistent with prediabetes  >or=6.5%  Consistent with diabetes    High levels of fetal hemoglobin interfere with the HbA1C  assay. Heterozygous hemoglobin variants (HbS, HgC, etc)do  not significantly interfere with this assay.   However, presence of multiple variants may affect accuracy.      Estimated  Avg Glucose 04/19/2024 108  68 - 131 mg/dL Final       EKG  No new tracing    Echo   Results for orders placed or performed during the hospital encounter of 11/03/21   Echo Saline Bubble? No   Result Value Ref Range    Ao peak maykel 1.53 m/s    IVS 1.30 (A) 0.6 - 1.1 cm    LA size 2.97 cm    LVIDd 5.16 3.5 - 6.0 cm    LVIDs 3.44 2.1 - 4.0 cm    LVOT diameter 1.71 cm    LVOT peak VTI 26.03 cm    PW 1.30 0.6 - 1.1 cm    MV Peak A Maykel 0.98 m/s    E wave deceleration time 399.02 msec    MV Peak E Maykel 0.55 m/s    RVDD 2.68 cm    TR Max Maykel 2.56 m/s    Ao root annulus 3.89 cm    AORTIC VALVE CUSP SEPERATION 1.85 cm    PV PEAK VELOCITY 0.96 cm/s    MV stenosis pressure 1/2 time 115.72 ms    LV Diastolic Volume 127.41 mL    LV Systolic Volume 48.65 mL    LVOT peak maykel 1.06 m/s    TDI LATERAL 0.07 m/s    TDI SEPTAL 0.06 m/s    LV LATERAL E/E' RATIO 7.86 m/s    LV SEPTAL E/E' RATIO 9.17 m/s    FS 33 %    LV mass 275.08 g    Left Ventricle Relative Wall Thickness 0.50 cm    AV Velocity Ratio 0.69     MV valve area p 1/2 method 1.90 cm2    E/A ratio 0.56     Mean e' 0.07 m/s    LVOT area 2.3 cm2    LVOT stroke volume 59.75 cm3    AV peak gradient 9 mmHg    E/E' ratio 8.46 m/s    Triscuspid Valve Regurgitation Peak Gradient 26 mmHg    Right Atrial Pressure (from IVC) 3 mmHg    EF 45 %    TV resting pulmonary artery pressure 29 mmHg    Narrative    · The left ventricle is normal in size with mild concentric hypertrophy   and mildly decreased systolic function.  · The estimated ejection fraction is 45%.  · Grade I left ventricular diastolic dysfunction.  · There are segmental left ventricular wall motion abnormalities.  · Normal central venous pressure (3 mmHg).  · The estimated PA systolic pressure is 29 mmHg.  · Mild tricuspid regurgitation.  · Normal right ventricular size with normal right ventricular systolic   function.          Imaging  X-Ray Chest PA And Lateral    Result Date: 3/7/2024  EXAMINATION: XR CHEST PA AND LATERAL  CLINICAL HISTORY: Acute cough TECHNIQUE: PA and lateral views of the chest were performed. COMPARISON: 06/05/2023 FINDINGS: No new lung opacity.  Patchy lucencies throughout the lungs with hyperaeration and flattening hemidiaphragms concerning for emphysematous change.  There is no lung consolidation.  Probable biapical pleural thickening similar to prior.  No pleural effusion or pneumothorax.  Heart size within normal limits.  Continued atherosclerotic aorta.  Visualized osseous structures grossly intact.  Further evaluation as warranted..     See above Electronically signed by: Koffi Vo DO Date:    03/07/2024 Time:    10:19      Prior coronary angiogram / intervention:  No records    Assessment and Plan  1. Hypercholesteremia  The patient is on high-dose statin    2. Primary hypertension  Controlled on atenolol    3. History of endovascular stent graft for abdominal aortic aneurysm (AAA)  Repeat abdominal aortic ultrasound and refer to vascular surgery if indicated    4. PVD (peripheral vascular disease)  Medically managed with antithrombotic and high-dose statin   Check lower extremity arterial duplex as there seems to be lifestyle limiting claudication despite high level of activity    5. Carotid stenosis, asymptomatic, bilateral  Not hemodynamically significant    6. RBBB  Noted    7. Coronary artery disease involving native coronary artery of native heart without angina pectoris  Dual antiplatelet therapy with high-dose statin beta-blocker    8. S/P PTCA (percutaneous transluminal coronary angioplasty)  As above  May discontinue Plavix if necessary but need to continue aspirin 81        Follow Up  Six-months      Alistair Bryant MD, FACC, VI  Interventional Cardiology     Total professional time spent for the encounter: 45 minutes  Time was spent preparing to see the patient, reviewing results of prior testing, obtaining and/or reviewing separately obtained history, performing a medically appropriate  examination and interview, counseling and educating the patient/family, ordering medications/tests/procedures, referring and communicating with other health care professionals, documenting clinical information in the electronic health record, and independently interpreting results.

## 2024-10-18 ENCOUNTER — TELEPHONE (OUTPATIENT)
Dept: CARDIOLOGY | Facility: CLINIC | Age: 84
End: 2024-10-18
Payer: MEDICARE

## 2024-10-18 NOTE — TELEPHONE ENCOUNTER
----- Message from Jeff Calabrese sent at 10/17/2024  4:48 PM CDT -----  Regarding: FW: Returning a Missed Call  Contact: [phone]    ----- Message -----  From: Walter Dangelo  Sent: 10/17/2024   3:08 PM CDT  To: [facility] Staff  Subject: Returning a Missed Call                          Returning a Missed Call     Caller: Martell Whitaker        Returning call to:  N/A     Caller can be reached @:  [phone]     Nature of the call: He's not sure

## 2024-10-18 NOTE — TELEPHONE ENCOUNTER
----- Message from Ivette sent at 10/18/2024  8:05 AM CDT -----  Contact: pt @ [phone]  MARLENA VALDIVIA calling regarding Patient Advice (message) for #pt returning call from [place], asking for call back  
Returned the pt's call and he inform me that the provider left him a detailed message.  Pt v/u  
bipolar

## 2024-10-28 DIAGNOSIS — E78.2 MIXED HYPERLIPIDEMIA: ICD-10-CM

## 2024-10-30 ENCOUNTER — OFFICE VISIT (OUTPATIENT)
Dept: RADIATION ONCOLOGY | Facility: CLINIC | Age: 84
End: 2024-10-30
Payer: MEDICARE

## 2024-10-30 ENCOUNTER — HOSPITAL ENCOUNTER (OUTPATIENT)
Dept: RADIOLOGY | Facility: HOSPITAL | Age: 84
Discharge: HOME | End: 2024-10-30
Attending: RADIOLOGY
Payer: MEDICARE

## 2024-10-30 VITALS
HEIGHT: 70 IN | WEIGHT: 133.63 LBS | OXYGEN SATURATION: 96 % | HEART RATE: 52 BPM | RESPIRATION RATE: 22 BRPM | BODY MASS INDEX: 19.13 KG/M2 | SYSTOLIC BLOOD PRESSURE: 132 MMHG | DIASTOLIC BLOOD PRESSURE: 71 MMHG | TEMPERATURE: 98 F

## 2024-10-30 DIAGNOSIS — Z85.118 PERSONAL HISTORY OF LUNG CANCER: Primary | ICD-10-CM

## 2024-10-30 DIAGNOSIS — Z85.118 PERSONAL HISTORY OF LUNG CANCER: ICD-10-CM

## 2024-10-30 PROCEDURE — 71250 CT THORAX DX C-: CPT | Mod: TC

## 2024-10-30 PROCEDURE — 99999 PR PBB SHADOW E&M-EST. PATIENT-LVL IV: CPT | Mod: PBBFAC,,, | Performed by: RADIOLOGY

## 2024-10-30 RX ORDER — ATORVASTATIN CALCIUM 40 MG/1
40 TABLET, FILM COATED ORAL DAILY
Qty: 90 TABLET | Refills: 0 | Status: SHIPPED | OUTPATIENT
Start: 2024-10-30

## 2024-11-04 ENCOUNTER — TELEPHONE (OUTPATIENT)
Dept: VASCULAR SURGERY | Facility: CLINIC | Age: 84
End: 2024-11-04
Payer: MEDICARE

## 2024-11-04 DIAGNOSIS — I71.40 ABDOMINAL AORTIC ANEURYSM (AAA) WITHOUT RUPTURE, UNSPECIFIED PART: Primary | ICD-10-CM

## 2024-11-04 NOTE — TELEPHONE ENCOUNTER
"Spoke with the pt to schedule a f/u appt but pt declined and verbalized "I'm busy right now I'll call you tomorrow". Contact information provided and he verbalized understanding of information received.  ----- Message from Neris sent at 11/4/2024 10:33 AM CST -----  Type:  Appointment Request    Name of Caller:SHEAMARLENA [[id number]]    When is the first available appointment?No access    Symptoms:    Would the patient rather a call back or a response via [facility]? Call back     Best Call Back Number: [phone]    Additional Information: patient states he was scheduled for an appointment with Dr. Hale 11/04 and not notified his appointment was canceled by the department. Patient states he is frustrated as he was not notified of the cancellation at all. Patient states he would like to reschedule with the department we there it is with his provider Dr. Grande or another provider as soon as possible. Please call back with further assistance.  "

## 2024-12-12 ENCOUNTER — OFFICE VISIT (OUTPATIENT)
Dept: PODIATRY | Facility: CLINIC | Age: 84
End: 2024-12-12
Payer: MEDICARE

## 2024-12-12 VITALS
HEIGHT: 70 IN | WEIGHT: 132.94 LBS | BODY MASS INDEX: 19.03 KG/M2 | HEART RATE: 55 BPM | DIASTOLIC BLOOD PRESSURE: 66 MMHG | SYSTOLIC BLOOD PRESSURE: 99 MMHG

## 2024-12-12 DIAGNOSIS — I73.9 CLAUDICATION: ICD-10-CM

## 2024-12-12 DIAGNOSIS — Z76.89 ENCOUNTER TO ESTABLISH CARE WITH NEW DOCTOR: Primary | ICD-10-CM

## 2024-12-12 DIAGNOSIS — B35.1 ONYCHOMYCOSIS OF TOENAIL: ICD-10-CM

## 2024-12-12 DIAGNOSIS — Z95.828 HISTORY OF ENDOVASCULAR STENT GRAFT FOR ABDOMINAL AORTIC ANEURYSM (AAA): ICD-10-CM

## 2024-12-12 DIAGNOSIS — I73.9 PVD (PERIPHERAL VASCULAR DISEASE): ICD-10-CM

## 2024-12-12 DIAGNOSIS — S80.812A ABRASION OF LEFT LOWER EXTREMITY, INITIAL ENCOUNTER: ICD-10-CM

## 2024-12-12 DIAGNOSIS — Z79.01 LONG TERM CURRENT USE OF ANTICOAGULANT: ICD-10-CM

## 2024-12-12 PROCEDURE — 11721 DEBRIDE NAIL 6 OR MORE: CPT | Mod: Q8,S$GLB,, | Performed by: PODIATRIST

## 2024-12-12 PROCEDURE — 3288F FALL RISK ASSESSMENT DOCD: CPT | Mod: CPTII,S$GLB,, | Performed by: PODIATRIST

## 2024-12-12 PROCEDURE — 1157F ADVNC CARE PLAN IN RCRD: CPT | Mod: CPTII,S$GLB,, | Performed by: PODIATRIST

## 2024-12-12 PROCEDURE — 99999 PR PBB SHADOW E&M-EST. PATIENT-LVL IV: CPT | Mod: PBBFAC,,, | Performed by: PODIATRIST

## 2024-12-12 PROCEDURE — 1101F PT FALLS ASSESS-DOCD LE1/YR: CPT | Mod: CPTII,S$GLB,, | Performed by: PODIATRIST

## 2024-12-12 PROCEDURE — 1126F AMNT PAIN NOTED NONE PRSNT: CPT | Mod: CPTII,S$GLB,, | Performed by: PODIATRIST

## 2024-12-12 PROCEDURE — 99203 OFFICE O/P NEW LOW 30 MIN: CPT | Mod: 25,S$GLB,, | Performed by: PODIATRIST

## 2024-12-12 NOTE — PROGRESS NOTES
Subjective:      Patient ID: Martell Whitaker is a 84 y.o. male.    Chief Complaint: Nail Care    Martell is a 84 y.o. male who presents new to the clinic for evaluation & tx of high risk feet. Martell The patient's cc is long, thick toenails. Accompanies his long-time girlfriend who is a current patient here, Niki Lara. Occasional pain hips w/ walking qd from [employer] to [employer] 8 blocks so has to rest a min. No other LE concerns.    PCP: Emelia Snyder MD    Date Last Seen by PCP: 10/2/24    2 yrs.clear of lung CA     Past Medical History:   Diagnosis Date    AAA (abdominal aortic aneurysm) without rupture     pt with h/o aaa     Acute coronary syndrome     Cancer     bladder    Carotid artery occlusion     COPD (chronic obstructive pulmonary disease)     Coronary artery disease     Enlarged prostate     History of bladder cancer     Hyperlipidemia     Hypertension     Lung abnormality     spot on lung under care of pulmonologist at [facility] Dr lua    Multiple thyroid nodules 1/31/2023    Myocardial infarct 1988    Urine retention      Patient Active Problem List   Diagnosis    HTN (hypertension)    Hypercholesteremia    CAD (coronary artery disease)    Pulmonary nodule    Urinary retention    Hx of myocardial infarction    Enlarged prostate    History of bladder cancer    Abdominal aortic aneurysm (AAA) without rupture    PVD (peripheral vascular disease)    Carotid stenosis, asymptomatic, bilateral    Centrilobular emphysema    S/P PTCA (percutaneous transluminal coronary angioplasty)    Aortic arch atherosclerosis    History of endovascular stent graft for abdominal aortic aneurysm (AAA)    RBBB    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 Heterozygous hemoglobin variants (HbS, HgC, etc)do  not significantly interfere with this assay.   However, presence of multiple variants may affect accuracy.     04/19/2024 5.4 4.0 - 5.6 % Final     Comment:     ADA Screening Guidelines:  5.7-6.4%  Consistent with prediabetes  >or=6.5%  Consistent with diabetes    High levels of fetal hemoglobin interfere with the HbA1C  assay. Heterozygous hemoglobin variants (HbS, HgC, etc)do  not significantly interfere with this assay.   However, presence of multiple variants may affect accuracy.     10/03/2023 5.5 4.0 - 5.6 % Final     Comment:     ADA Screening Guidelines:  5.7-6.4%  Consistent with prediabetes  >or=6.5%  Consistent with diabetes    High levels of fetal hemoglobin interfere with the HbA1C  assay. Heterozygous hemoglobin variants (HbS, HgC, etc)do  not significantly interfere with this assay.   However, presence of multiple variants may affect accuracy.        Objective:      Review of Systems   Constitutional: Negative for malaise/fatigue.   Cardiovascular:  Positive for claudication. Negative for leg swelling.   Skin:  Positive for color change, dry skin and nail changes. Negative for poor wound healing and suspicious lesions.   Musculoskeletal:  Negative for falls, joint pain and myalgias.   Neurological:  Negative for focal weakness, loss of balance and sensory change.   Psychiatric/Behavioral:  The patient is not nervous/anxious.      Physical Exam  Vitals reviewed.   Constitutional:       General: He is not in acute distress.     Appearance: He is well-developed and normal weight.   Cardiovascular:      Pulses:           Dorsalis pedis pulses are detected w/ Doppler  on the right side and detected w/ Doppler on the left side.   Musculoskeletal:         General: No swelling or tenderness.   Feet:      Comments: Toenails 1st & 5th B/L, 2nd & 3rd L are hypertrophic, w/ distal thickened, dystrophic, discolored nail plates.  Tender to distal nail plate pressure, w/out periungual skin abnormality noted.      Skin:     General: Skin is warm and dry.      Capillary Refill: Capillary refill takes 2 to 3 seconds.      Findings: Abrasion (anterior lateral lower leg L) present. No bruising, erythema, signs of injury or wound.      Nails: There is no clubbing.   Neurological:      Mental Status: He is alert and oriented to person, place, and time.      Motor: Motor function is intact. No weakness.      Gait: Gait is intact. Gait normal.   Psychiatric:         Mood and Affect: Mood and affect normal.         Behavior: Behavior normal. Behavior is cooperative.       Radiology US Lower Extremity Arteries Bilateral  Order: [id number]  Status: Final result       Visible to patient: Yes (not seen)       Next appt: 01/08/2025 at 01:00 PM in Family Medicine (Emelia Sndyer MD)       Dx: PVD (peripheral vascular disease); Cl...    0 Result Notes  Details    Reading Physician Reading Date Result Priority   Munir Morales MD  [phone] 10/17/2024 Routine     Narrative & Impression  EXAMINATION:  US LOWER EXTREMITY ARTERIES BILATERAL     CLINICAL HISTORY:  Peripheral vascular disease, unspecified     TECHNIQUE:  Bilateral lower extremity arterial duplex ultrasound examination performed. Multiple gray scale and color doppler images were obtained in addition to waveform analysis.     COMPARISON:  Ultrasound 11/03/2021.     FINDINGS:  The peak systolic velocities on the right are as follows, in centimeters/second:     Common femoral artery: 122, biphasic     Femoral artery, proximal: 103, biphasic     Femoral artery, mid portion: Focal area of high-grade stenosis as follows: Prestenotic 157,  intra-stenotic 310, poststenotic 140.     Femoral artery, distal: 41, biphasic     Popliteal artery: Proximal 27, biphasic; distal 33, biphasic     Anterior tibial artery: 21, biphasic     Posterior tibial artery: 38, biphasic     Peroneal artery: 35, biphasic     Dorsalis pedis artery: 10     The peak systolic velocities on the left are as follows, in centimeters/second:     Common femoral artery: 112, triphasic     Femoral artery, proximal: 105, biphasic     Femoral artery, mid portion: 95, biphasic     Femoral artery, distal: 38, biphasic     Popliteal artery: Proximal 28, biphasic; distal 42, biphasic     Anterior tibial artery: 34, biphasic     Posterior tibial artery: 51, biphasic     Peroneal artery: 43, biphasic     Dorsalis pedis artery: 27, biphasic     Impression:     Focal area of greater than 50% stenosis at the RIGHT mid SFA.        Electronically signed by:Munir Morales MD  Date:                                            10/17/2024  Time:                                           12:17     Assessment:      Encounter Diagnoses   Name Primary?    Long term current use of anticoagulant     History of endovascular stent graft for abdominal aortic aneurysm (AAA)     PVD (peripheral vascular disease)     Claudication     Onychomycosis of toenail     Abrasion of left lower extremity, initial encounter     Encounter to establish care with new doctor Yes       Problem List Items Addressed This Visit          Cardiac/Vascular    PVD (peripheral vascular disease)    Overview     Recommend smoking cessation  Continue walking program         Relevant Orders    Routine Foot Care    History of endovascular stent graft for abdominal aortic aneurysm (AAA)    Claudication    Relevant Orders    Routine Foot Care     Other Visit Diagnoses       Encounter to establish care with new doctor    -  Primary    Long term current use of anticoagulant        Relevant Orders    Routine Foot Care    Onychomycosis of toenail         Relevant Orders    Routine Foot Care    Abrasion of left lower extremity, initial encounter              Plan:       Martell was seen today for nail care.    Diagnoses and all orders for this visit:    Encounter to establish care with new doctor    Long term current use of anticoagulant  -     Routine Foot Care    History of endovascular stent graft for abdominal aortic aneurysm (AAA)    PVD (peripheral vascular disease)  -     Routine Foot Care    Claudication  -     Routine Foot Care    Onychomycosis of toenail  -     Routine Foot Care    Abrasion of left lower extremity, initial encounter    Other orders  -     Routine Foot Care    I counseled the patient on his conditions, their implications & medical mgmt.    - Shoe inspection. Patient instructed on proper foot hygeine. We discussed wearing proper shoe gear, daily foot inspections, never walking without protective shoe gear, never putting sharp instruments to feet, routine podiatric nail visits every 2-3 months.      - W/ patient's permission, nails were aggressively reduced & debrided x 10 to their soft tissue attachment mechanically , removing all offending nail & debris. Patient relates relief following the procedure.   He will continue to monitor the areas daily, inspect his feet, wear protective shoe gear when ambulatory, moisturizer to maintain skin integrity & follow in this office in approximately 4-6 months, sooner p.r.n.        A total of 29 mins.was spent on chart review, patient visit & documentation.

## 2024-12-20 PROBLEM — R05.9 COUGH: Status: ACTIVE | Noted: 2024-12-20

## 2024-12-20 PROBLEM — J11.1 INFLUENZA: Status: ACTIVE | Noted: 2024-12-20

## 2024-12-21 NOTE — PROCEDURES
Routine Foot Care    Date/Time: 12/12/2024 2:00 PM    Performed by: Monica Tijerina DPM  Authorized by: Monica Tijerina DPM    Consent Done?:  Yes (Verbal)    Nail Care Type:  Debride  Location(s): All  (Left 1st Toe, Left 3rd Toe, Left 2nd Toe, Left 4th Toe, Left 5th Toe, Right 1st Toe, Right 2nd Toe, Right 3rd Toe, Right 4th Toe and Right 5th Toe)  Patient tolerance:  Patient tolerated the procedure well with no immediate complications

## 2025-02-14 ENCOUNTER — TELEPHONE (OUTPATIENT)
Dept: SPEECH THERAPY | Facility: HOSPITAL | Age: 85
End: 2025-02-14
Payer: MEDICARE

## 2025-02-14 NOTE — TELEPHONE ENCOUNTER
Sw pt to schedule mbss he stated he will cancel as he do not have a way to get to main campus, the problem have also resolved.----- Message from Ivette sent at 1/31/2025 12:15 PM CST -----  Contact: pt @ [phone]  MARLENA VALDIVIA calling regarding Appointment Access  (message) for #pt is calling to get appt for swallowing test, asking for call back

## 2025-02-26 ENCOUNTER — OFFICE VISIT (OUTPATIENT)
Dept: UROLOGY | Facility: CLINIC | Age: 85
End: 2025-02-26
Payer: MEDICARE

## 2025-02-26 VITALS
DIASTOLIC BLOOD PRESSURE: 62 MMHG | HEART RATE: 60 BPM | BODY MASS INDEX: 18.95 KG/M2 | SYSTOLIC BLOOD PRESSURE: 107 MMHG | WEIGHT: 132.06 LBS

## 2025-02-26 DIAGNOSIS — N47.1 PHIMOSIS: ICD-10-CM

## 2025-02-26 DIAGNOSIS — R31.0 GROSS HEMATURIA: Primary | ICD-10-CM

## 2025-02-26 LAB
BILIRUB SERPL-MCNC: NORMAL MG/DL
BLOOD URINE, POC: NORMAL
CLARITY, POC UA: NORMAL
COLOR, POC UA: NORMAL
GLUCOSE UR QL STRIP: NORMAL
KETONES UR QL STRIP: NORMAL
LEUKOCYTE ESTERASE URINE, POC: NORMAL
NITRITE, POC UA: NORMAL
PH, POC UA: 6.5
PROTEIN, POC: NORMAL
SPECIFIC GRAVITY, POC UA: 1.01
UROBILINOGEN, POC UA: NORMAL

## 2025-02-26 PROCEDURE — 87106 FUNGI IDENTIFICATION YEAST: CPT | Performed by: STUDENT IN AN ORGANIZED HEALTH CARE EDUCATION/TRAINING PROGRAM

## 2025-02-26 PROCEDURE — 1157F ADVNC CARE PLAN IN RCRD: CPT | Mod: CPTII,S$GLB,, | Performed by: STUDENT IN AN ORGANIZED HEALTH CARE EDUCATION/TRAINING PROGRAM

## 2025-02-26 PROCEDURE — 87088 URINE BACTERIA CULTURE: CPT | Performed by: STUDENT IN AN ORGANIZED HEALTH CARE EDUCATION/TRAINING PROGRAM

## 2025-02-26 PROCEDURE — 81002 URINALYSIS NONAUTO W/O SCOPE: CPT | Mod: S$GLB,,, | Performed by: STUDENT IN AN ORGANIZED HEALTH CARE EDUCATION/TRAINING PROGRAM

## 2025-02-26 PROCEDURE — 1126F AMNT PAIN NOTED NONE PRSNT: CPT | Mod: CPTII,S$GLB,, | Performed by: STUDENT IN AN ORGANIZED HEALTH CARE EDUCATION/TRAINING PROGRAM

## 2025-02-26 PROCEDURE — 88112 CYTOPATH CELL ENHANCE TECH: CPT | Performed by: STUDENT IN AN ORGANIZED HEALTH CARE EDUCATION/TRAINING PROGRAM

## 2025-02-26 PROCEDURE — 99214 OFFICE O/P EST MOD 30 MIN: CPT | Mod: S$GLB,,, | Performed by: STUDENT IN AN ORGANIZED HEALTH CARE EDUCATION/TRAINING PROGRAM

## 2025-02-26 PROCEDURE — 99999 PR PBB SHADOW E&M-EST. PATIENT-LVL IV: CPT | Mod: PBBFAC,,, | Performed by: STUDENT IN AN ORGANIZED HEALTH CARE EDUCATION/TRAINING PROGRAM

## 2025-02-26 PROCEDURE — 1159F MED LIST DOCD IN RCRD: CPT | Mod: CPTII,S$GLB,, | Performed by: STUDENT IN AN ORGANIZED HEALTH CARE EDUCATION/TRAINING PROGRAM

## 2025-02-26 PROCEDURE — 87086 URINE CULTURE/COLONY COUNT: CPT | Performed by: STUDENT IN AN ORGANIZED HEALTH CARE EDUCATION/TRAINING PROGRAM

## 2025-02-26 NOTE — PROGRESS NOTES
"[facility] Urology [facility] [address]   Clinic Note    SUBJECTIVE:     Chief Complaint: gross hematuria    History of Present Illness:  Martell Whitaker is a 84 y.o. male who presents to clinic for gross hematuria. He is established to our clinic but new to me.     He has a reported history of bladder cancer >10 years ago; these records are unavailable.   S/p negative hematuria workup January 2024.  Taking Flomax and finasteride daily.  Seen last by NP Percle 08/2024 for hematuria with (possible) clot retention. Passed VT then.    He presented again to ED 02/2025 with gross hematuria, no difficulty voiding. CT RSS was obtained and shows small bilateral renal stones (without ureteral stones or hydronephrosis), plus a possible bladder mass. UA at this visit was nitrite positive (albeit grossly bloody, and with rare bacteria on micro UA); urine was not sent for culture, but he was sent home with empiric antibiotics.  Has LUTS, well controlled on Flomax.  Has phimosis >1 year; tried Lotrisone for 6 months without improvement.    Anticoagulation:  Yes - ASA and Plavix.     OBJECTIVE:     Estimated body mass index is 18.95 kg/m² as calculated from the following:    Height as of 2/12/25: 5' 10" (1.778 m).    Weight as of this encounter: 59.9 kg (132 lb 0.9 oz).    Vital Signs (Most Recent)  Pulse: 60 (02/26/25 1322)  BP: 107/62 (02/26/25 1322)    Physical Exam  Vitals reviewed.   Constitutional:       Appearance: Normal appearance.   HENT:      Head: Normocephalic and atraumatic.   Cardiovascular:      Rate and Rhythm: Normal rate.   Pulmonary:      Effort: Pulmonary effort is normal.   Genitourinary:     Comments: Uncircumcised penis with phimotic foreskin, unable to retract to expose glans; testes descended bilaterally, atrophic  Musculoskeletal:         General: Normal range of motion.   Skin:     General: Skin is warm and dry.   Neurological:      General: No focal deficit present.      Mental Status: He is alert and oriented " to person, place, and time.   Psychiatric:         Mood and Affect: Mood normal.         Behavior: Behavior normal.         Thought Content: Thought content normal.         Judgment: Judgment normal.         Lab Results   Component Value Date    BUN 19 02/12/2025    CREATININE 0.9 02/12/2025    WBC 8.40 02/12/2025    HGB 12.7 (L) 02/12/2025    HCT 40.6 02/12/2025     02/12/2025    AST 15 02/12/2025    ALT 14 02/12/2025    ALKPHOS 92 02/12/2025    ALBUMIN 3.4 (L) 02/12/2025    HGBA1C 5.2 10/03/2024        Lab Results   Component Value Date    PSA 1.4 03/04/2019    PSADIAG 1.22 12/20/2022    PSADIAG 1.0 11/24/2020    PSADIAG 3.7 03/22/2017    PSADIAG 1.8 11/25/2014       ASSESSMENT     1. Gross hematuria    2. Phimosis      PLAN:   1. Gross hematuria  -     POCT urine dipstick without microscope  -     Cytology, Urine  -     Urine Culture High Risk  -     CT Urogram Abd Pelvis W WO; Future; Expected date: 02/26/2025  -     Cystoscopy; Future    2. Phimosis       Complete gross hematuria workup - urine culture and cytology, CT urogram, and cystoscopy.  Phimosis has not responded to medical management; patient wishes to pursue dorsal slit rather than circumcision. Will schedule after in-office cystoscopy.    Eriberto Rose MD

## 2025-02-28 ENCOUNTER — RESULTS FOLLOW-UP (OUTPATIENT)
Dept: UROLOGY | Facility: CLINIC | Age: 85
End: 2025-02-28

## 2025-02-28 LAB
BACTERIA UR CULT: ABNORMAL
FINAL PATHOLOGIC DIAGNOSIS: NORMAL
Lab: NORMAL
MICROSCOPIC EXAM: NORMAL

## 2025-02-28 RX ORDER — FLUCONAZOLE 100 MG/1
100 TABLET ORAL DAILY
Qty: 7 TABLET | Refills: 0 | Status: SHIPPED | OUTPATIENT
Start: 2025-02-28 | End: 2025-03-07

## 2025-03-11 ENCOUNTER — TELEPHONE (OUTPATIENT)
Dept: SURGERY | Facility: CLINIC | Age: 85
End: 2025-03-11
Payer: MEDICARE

## 2025-03-11 NOTE — TELEPHONE ENCOUNTER
"----- Message from Reynold sent at 3/11/2025  8:45 AM CDT -----  Regarding: call back  Consult/Advisory:  Name Of Caller: Self Contact Preference?:[phone] What is the nature of the call?: Calling to speak w/  someone in regards to scheduling an appt   Additional Notes:"Thank you for all that you do for our patients"  "

## 2025-03-18 ENCOUNTER — OFFICE VISIT (OUTPATIENT)
Dept: SURGERY | Facility: CLINIC | Age: 85
End: 2025-03-18
Payer: MEDICARE

## 2025-03-18 VITALS
DIASTOLIC BLOOD PRESSURE: 63 MMHG | SYSTOLIC BLOOD PRESSURE: 137 MMHG | BODY MASS INDEX: 18.9 KG/M2 | WEIGHT: 131.75 LBS | HEART RATE: 57 BPM

## 2025-03-18 DIAGNOSIS — K42.9 UMBILICAL HERNIA WITHOUT OBSTRUCTION AND WITHOUT GANGRENE: Primary | ICD-10-CM

## 2025-03-18 DIAGNOSIS — Z87.19 HISTORY OF BILATERAL INGUINAL HERNIAS: ICD-10-CM

## 2025-03-18 PROCEDURE — 1159F MED LIST DOCD IN RCRD: CPT | Mod: CPTII,S$GLB,, | Performed by: SURGERY

## 2025-03-18 PROCEDURE — 1160F RVW MEDS BY RX/DR IN RCRD: CPT | Mod: CPTII,S$GLB,, | Performed by: SURGERY

## 2025-03-18 PROCEDURE — 1157F ADVNC CARE PLAN IN RCRD: CPT | Mod: CPTII,S$GLB,, | Performed by: SURGERY

## 2025-03-18 PROCEDURE — 99203 OFFICE O/P NEW LOW 30 MIN: CPT | Mod: S$GLB,,, | Performed by: SURGERY

## 2025-03-18 PROCEDURE — 1126F AMNT PAIN NOTED NONE PRSNT: CPT | Mod: CPTII,S$GLB,, | Performed by: SURGERY

## 2025-03-18 PROCEDURE — 99999 PR PBB SHADOW E&M-EST. PATIENT-LVL III: CPT | Mod: PBBFAC,,, | Performed by: SURGERY

## 2025-03-24 ENCOUNTER — PROCEDURE VISIT (OUTPATIENT)
Dept: UROLOGY | Facility: CLINIC | Age: 85
End: 2025-03-24
Payer: MEDICARE

## 2025-03-24 VITALS
DIASTOLIC BLOOD PRESSURE: 79 MMHG | WEIGHT: 129.44 LBS | BODY MASS INDEX: 18.57 KG/M2 | HEART RATE: 67 BPM | SYSTOLIC BLOOD PRESSURE: 144 MMHG

## 2025-03-24 DIAGNOSIS — R31.0 GROSS HEMATURIA: ICD-10-CM

## 2025-03-24 LAB
BILIRUB SERPL-MCNC: NORMAL MG/DL
BLOOD URINE, POC: NORMAL
CLARITY, POC UA: NORMAL
COLOR, POC UA: YELLOW
GLUCOSE UR QL STRIP: NORMAL
KETONES UR QL STRIP: NORMAL
LEUKOCYTE ESTERASE URINE, POC: NORMAL
NITRITE, POC UA: NORMAL
PH, POC UA: 6.5
PROTEIN, POC: NORMAL
SPECIFIC GRAVITY, POC UA: 1.01
UROBILINOGEN, POC UA: NORMAL

## 2025-03-24 PROCEDURE — 81002 URINALYSIS NONAUTO W/O SCOPE: CPT | Mod: S$GLB,,, | Performed by: STUDENT IN AN ORGANIZED HEALTH CARE EDUCATION/TRAINING PROGRAM

## 2025-03-24 PROCEDURE — 52000 CYSTOURETHROSCOPY: CPT | Mod: S$GLB,,, | Performed by: STUDENT IN AN ORGANIZED HEALTH CARE EDUCATION/TRAINING PROGRAM

## 2025-03-24 RX ORDER — SULFAMETHOXAZOLE AND TRIMETHOPRIM 800; 160 MG/1; MG/1
1 TABLET ORAL ONCE
Status: COMPLETED | OUTPATIENT
Start: 2025-03-24 | End: 2025-03-24

## 2025-03-24 RX ORDER — LIDOCAINE HYDROCHLORIDE 20 MG/ML
JELLY TOPICAL
Status: COMPLETED | OUTPATIENT
Start: 2025-03-24 | End: 2025-03-24

## 2025-03-24 RX ADMIN — SULFAMETHOXAZOLE AND TRIMETHOPRIM 1 TABLET: 800; 160 TABLET ORAL at 10:03

## 2025-03-24 RX ADMIN — LIDOCAINE HYDROCHLORIDE: 20 JELLY TOPICAL at 10:03

## 2025-03-24 NOTE — PROCEDURES
Martell Whitaker is a 84 y.o. male patient.    Pulse: 67 (03/24/25 0956)  BP: (!) 144/79 (03/24/25 0956)  Weight: 58.7 kg (129 lb 6.6 oz) (03/24/25 0956)       Cystoscopy    Date/Time: 3/24/2025 10:18 AM    Performed by: Eriberto Rose MD  Authorized by: Eriberto Rose MD    Consent Done?:  Yes (Written)  Timeout: prior to procedure the correct patient, procedure, and site was verified    Local anesthesia used?: Yes    Indications: hematuria    External exam normal: No    Circumcised: No (phimotic foreskin, not able to visualize meatus)    Urethra normal: Yes    Prostate normal: No (TURP defect with trilobar regrowth)    Bladder neck normal: Yes    Bladder normal: Yes     patient tolerated the procedure well with no immediate complications  Comments:      No evidence of bladder cancer. Patient not bothered by LUTS and not having clot retention, so outlet procedure warranted.   Patient declines circumcision and dorsal slit at this time.      3/24/2025

## 2025-04-02 NOTE — PROGRESS NOTES
History & Physical    Subjective     History of Present Illness:  Patient is a 84 y.o. male presents with recent CT scan findings of fat containing umbilical and bilateral inguinal hernias.    Discussed with the patient these findings and active he has had any severe symptoms.    He says he occasionally gets discomfort at the umbilicus in the lower abdomen however never gets severe pain or notices any bulge.    Discussed with him that with his age and comorbidities close observation would be the best option right now.    Patient agrees to this plan.    He just went to come to confirm he did not need urgent surgical intervention with this findings of hernias.    Explain to him that incarceration or strangulation could be possible however monitor for any tenderness or swelling at hernia sites for concern of urgent action to be needed.      Chief Complaint   Patient presents with    Hernia       Review of patient's allergies indicates:  No Known Allergies    Current Medications[1]    Past Medical History:   Diagnosis Date    AAA (abdominal aortic aneurysm) without rupture     pt with h/o aaa     Acute coronary syndrome     Cancer     bladder    Carotid artery occlusion     COPD (chronic obstructive pulmonary disease)     Coronary artery disease     Enlarged prostate     History of bladder cancer     Hyperlipidemia     Hypertension     Lung abnormality     spot on lung under care of pulmonologist at [facility] Dr lua    Multiple thyroid nodules 1/31/2023    Myocardial infarct 1988    Urine retention      Past Surgical History:   Procedure Laterality Date    ABDOMINAL AORTIC ANEURYSM REPAIR  2007    BLADDER SURGERY      CARDIAC CATHETERIZATION      CORONARY ANGIOPLASTY      CORONARY STENT PLACEMENT      FRACTURE SURGERY      arm    PROSTATE SURGERY      ROBOTIC BRONCHOSCOPY N/A 5/31/2022    Procedure: ROBOTIC BRONCHOSCOPY;  Surgeon: Mickey Acosta MD;  Location: [facility] OR [address] [place] [address];  Service: Pulmonary;  Laterality:  N/A;    TONSILLECTOMY       Family History   Problem Relation Name Age of Onset    Cancer Father          leukemia    Clotting disorder Mother       Social History[2]     Review of Systems:  Review of Systems   Constitutional:  Negative for appetite change, fatigue, fever and unexpected weight change.   HENT:  Negative for sore throat and trouble swallowing.    Eyes: Negative.    Respiratory:  Negative for cough, shortness of breath and wheezing.    Cardiovascular:  Negative for chest pain and leg swelling.   Gastrointestinal:  Negative for abdominal distention, abdominal pain, blood in stool, constipation, diarrhea, nausea and vomiting.   Endocrine: Negative.    Genitourinary: Negative.    Musculoskeletal:  Negative for back pain.   Skin: Negative.  Negative for rash.   Allergic/Immunologic: Negative.    Neurological: Negative.    Hematological: Negative.    Psychiatric/Behavioral:  Negative for confusion.         Objective     Vital Signs (Most Recent)  Pulse: (!) 57 (03/18/25 0819)  BP: 137/63 (03/18/25 0819)     59.8 kg (131 lb 11.6 oz)     Physical Exam:  Physical Exam  Vitals and nursing note reviewed.   Constitutional:       Appearance: He is well-developed.   HENT:      Head: Normocephalic and atraumatic.   Cardiovascular:      Rate and Rhythm: Normal rate.      Heart sounds: Normal heart sounds.   Pulmonary:      Effort: Pulmonary effort is normal.   Abdominal:      General: Bowel sounds are normal. There is no distension.      Palpations: Abdomen is soft.      Tenderness: There is no abdominal tenderness.      Hernia: A hernia (umbilical and bilateral inguinal, fat containing, reducible) is present.   Musculoskeletal:         General: Normal range of motion.      Cervical back: Normal range of motion.   Skin:     General: Skin is warm and dry.      Capillary Refill: Capillary refill takes less than 2 seconds.   Neurological:      Mental Status: He is alert and oriented to person, place, and time.    Psychiatric:         Behavior: Behavior normal.     Laboratory  CBC: Reviewed  CMP: Reviewed    Diagnostic Results:  CT: Reviewed  Bilateral inguinal hernias and fat containing umbilical hernia       Assessment and Plan   84-year-old male with a umbilical hernia and bilateral fat containing inguinal hernias    PLAN:    Discussed clips observation for now.    Due to patient's age and comorbidities unlikely to perform elective surgical intervention unless patient came symptomatic.                  [1]   Current Outpatient Medications   Medication Sig Dispense Refill    albuterol (PROVENTIL) 2.5 mg /3 mL (0.083 %) nebulizer solution Take 3 mLs (2.5 mg total) by nebulization every 6 (six) hours as needed for Wheezing. Rescue 30 each 0    albuterol (PROVENTIL/VENTOLIN HFA) 90 mcg/actuation inhaler Inhale 1 puff into the lungs every 4 (four) hours as needed for Wheezing. Rescue 17 g 2    ascorbic acid, vitamin C, (VITAMIN C) 500 MG tablet Take 500 mg by mouth once daily.      aspirin (ECOTRIN) 81 MG EC tablet Take 81 mg by mouth once daily.      atenoloL (TENORMIN) 25 MG tablet Take 1 tablet (25 mg total) by mouth once daily. 90 tablet 0    atorvastatin (LIPITOR) 40 MG tablet Take 1 tablet (40 mg total) by mouth every evening. 90 tablet 0    cetirizine (ZYRTEC) 10 MG tablet Take 1 tablet (10 mg total) by mouth once daily. 30 tablet 2    clopidogreL (PLAVIX) 75 mg tablet Take 1 tablet (75 mg total) by mouth once. for 1 dose 90 tablet 0    ergocalciferol (VITAMIN D2) 50,000 unit Cap Take 1 capsule (50,000 Units total) by mouth every 7 days. 12 capsule 0    levothyroxine (SYNTHROID) 25 MCG tablet Take 1 tablet (25 mcg total) by mouth before breakfast. 90 tablet 0    nebulizer accessories Kit 1 each by Misc.(Non-Drug; Combo Route) route daily as needed (sob/wheezing). 1 kit 0    potassium chloride (KLOR-CON) 10 MEQ TbSR TAKE 1 TABLET BY MOUTH ONCE  DAILY 90 tablet 0    tamsulosin (FLOMAX) 0.4 mg Cap Take 1 capsule (0.4 mg  total) by mouth once daily. 90 capsule 0    vitamins A,C,E-zinc-copper (ICAPS AREDS) 4,296 mcg-226 mg-90 mg Cap Take 1 capsule by mouth 2 (two) times a day. 180 capsule 0     No current facility-administered medications for this visit.   [2]   Social History  Tobacco Use    Smoking status: Every Day     Current packs/day: 0.50     Average packs/day: 0.5 packs/day for 60.0 years (30.0 ttl pk-yrs)     Types: Cigarettes    Smokeless tobacco: Never   Substance Use Topics    Alcohol use: Yes     Alcohol/week: 1.0 standard drink of alcohol     Types: 1 Shots of liquor per week     Comment:  five drink per week     Drug use: No

## 2025-04-11 NOTE — PROGRESS NOTES
Subjective:      Patient ID: Martell Whitaker is a 84 y.o. male.    Chief Complaint: Nail Care and Ingrown Toenail    Patient presents accompanied by his long-time girlfriend, Niki Lara, who  also has an appt.here today. He c/o IGTN. Has toe cramps @ night so takes pickle juice prn (same as long-term GF, Niki Lara, who also has same c/o).   12/12/24  Martell is a 84 y.o. male who presents new to the clinic for evaluation & tx of high risk feet. Martell The patient's cc is long, thick toenails. Accompanies his long-time girlfriend who is a current patient here, Niki Lara. Occasional pain hips w/ walking qd from [employer] to [employer] 8 blocks so has to rest a min. No other LE concerns.    PCP: Emelia Snyder MD 03/12/2025, due 05/28/25    2 yrs.clear of lung CA  Past Medical History:   Diagnosis Date    AAA (abdominal aortic aneurysm) without rupture     pt with h/o aaa     Acute coronary syndrome     Cancer     bladder    Carotid artery occlusion     COPD (chronic obstructive pulmonary disease)     Coronary artery disease     Enlarged prostate     History of bladder cancer     Hyperlipidemia     Hypertension     Lung abnormality     spot on lung under care of pulmonologist at [facility]    Multiple thyroid nodules 1/31/2023    Myocardial infarct 1988    Urine retention      Patient Active Problem List   Diagnosis    HTN (hypertension)    Hypercholesteremia    CAD (coronary artery disease)    Pulmonary nodule    Urinary retention    Hx of myocardial infarction    Enlarged prostate    History of bladder cancer    Abdominal aortic aneurysm (AAA) without rupture    PVD (peripheral vascular disease)    Carotid stenosis, asymptomatic, bilateral    Centrilobular emphysema    S/P PTCA (percutaneous transluminal coronary angioplasty)    Aortic arch atherosclerosis    History of endovascular stent graft for abdominal aortic aneurysm (AAA)    RBBB    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   Cardiovascular:  Positive for claudication. Negative for leg swelling.   Skin:  Positive for color change, dry skin and nail changes. Negative for poor wound healing and suspicious lesions.   Musculoskeletal:  Negative for falls, joint pain and myalgias.   Neurological:  Negative for focal weakness, loss of balance and sensory change.   Psychiatric/Behavioral:  The patient is not nervous/anxious.      Physical Exam  Vitals reviewed.   Constitutional:       General: He is not in acute distress.     Appearance: He is well-developed and normal weight.   Cardiovascular:      Pulses:           Dorsalis pedis pulses are detected w/ Doppler on the right side and detected w/ Doppler on the left side.      Comments: L>R ankle edema , non-pitting.   Musculoskeletal:         General: No swelling or tenderness.      Right foot: Deformity present.      Left foot: Deformity (cavus BLE) present.   Feet:      Right foot:      Skin integrity: Dry skin present.      Toenail Condition: Right toenails are long. Fungal disease present.     Left foot:      Skin integrity: Dry skin present.      Toenail Condition: Left toenails are long. Fungal disease present.     Comments: Toenails 1st & 5th B/L, 2nd & 3rd L are hypertrophic, w/ distal thickened, dystrophic, discolored nail plates.  Tender to distal nail plate pressure, w/out periungual skin abnormality noted. Rest of nails also hypertrophic w/out dystrophy.      Skin:     General: Skin is warm and dry.      Capillary Refill: Capillary refill takes 2 to 3 seconds.      Findings: No abrasion, bruising, erythema, signs of injury or  wound.      Nails: There is no clubbing.   Neurological:      Mental Status: He is alert and oriented to person, place, and time.      Sensory: Sensation is intact.      Motor: Motor function is intact. No weakness or abnormal muscle tone.      Gait: Gait is intact. Gait normal.      Comments: Toe cramps w/ no clearly identified trigger or source; no hyperemia.    No TTP nor fullness IMS R/ L/ B/L; no increase on lateral met.head compression.    Psychiatric:         Mood and Affect: Mood and affect normal.         Behavior: Behavior normal. Behavior is cooperative.       Radiology US Lower Extremity Arteries Bilateral  Order: [id number]  Status: Final result       Visible to patient: Yes (not seen)       Next appt: 01/08/2025 at 01:00 PM in Family Medicine (Emelia Snyder MD)       Dx: PVD (peripheral vascular disease); Cl...    0 Result Notes  Details    Reading Physician Reading Date Result Priority   Munir Morales MD  [phone] 10/17/2024 Routine     Narrative & Impression  EXAMINATION:  US LOWER EXTREMITY ARTERIES BILATERAL     CLINICAL HISTORY:  Peripheral vascular disease, unspecified     TECHNIQUE:  Bilateral lower extremity arterial duplex ultrasound examination performed. Multiple gray scale and color doppler images were obtained in addition to waveform analysis.     COMPARISON:  Ultrasound 11/03/2021.     FINDINGS:  The peak systolic velocities on the right are as follows, in centimeters/second:     Common femoral artery: 122, biphasic     Femoral artery, proximal: 103, biphasic     Femoral artery, mid portion: Focal area of high-grade stenosis as follows: Prestenotic 157, intra-stenotic 310, poststenotic 140.     Femoral artery, distal: 41, biphasic     Popliteal artery: Proximal 27, biphasic; distal 33, biphasic     Anterior tibial artery: 21, biphasic     Posterior tibial artery: 38, biphasic     Peroneal artery: 35, biphasic     Dorsalis pedis artery: 10     The peak systolic velocities on  the left are as follows, in centimeters/second:     Common femoral artery: 112, triphasic     Femoral artery, proximal: 105, biphasic     Femoral artery, mid portion: 95, biphasic     Femoral artery, distal: 38, biphasic     Popliteal artery: Proximal 28, biphasic; distal 42, biphasic     Anterior tibial artery: 34, biphasic     Posterior tibial artery: 51, biphasic     Peroneal artery: 43, biphasic     Dorsalis pedis artery: 27, biphasic     Impression:     Focal area of greater than 50% stenosis at the RIGHT mid SFA.        Electronically signed by:Munir Morales MD  Date:                                            10/17/2024  Time:                                           12:17     Assessment:      Encounter Diagnoses   Name Primary?    Long term current use of anticoagulant Yes    History of endovascular stent graft for abdominal aortic aneurysm (AAA)     PVD (peripheral vascular disease)     Claudication     Ingrown nail     Onychomycosis of toenail     Cramp of toe        Problem List Items Addressed This Visit          Cardiac/Vascular    PVD (peripheral vascular disease)    Overview   Recommend smoking cessation  Continue walking program         Relevant Orders    Nail debridement    History of endovascular stent graft for abdominal aortic aneurysm (AAA)    Claudication     Other Visit Diagnoses         Long term current use of anticoagulant    -  Primary    Relevant Orders    Nail debridement      Ingrown nail        Relevant Orders    Nail debridement      Onychomycosis of toenail        Relevant Orders    Nail debridement      Cramp of toe              Plan:       Martell was seen today for nail care and ingrown toenail.    Diagnoses and all orders for this visit:    Long term current use of anticoagulant  -     Nail debridement    History of endovascular stent graft for abdominal aortic aneurysm (AAA)    PVD (peripheral vascular disease)  -     Nail debridement    Claudication    Ingrown nail  -     Nail  debridement    Onychomycosis of toenail  -     Nail debridement    Cramp of toe      I counseled the patient on his conditions, their implications & medical mgmt.    - Shoe inspection. Patient instructed on proper foot hygeine. We discussed wearing proper shoe gear, daily foot inspections, never walking without protective shoe gear, never putting sharp instruments to feet, routine podiatric nail visits every 2-3 months.      - W/ patient's permission, nails were aggressively reduced & debrided x 10 to their soft tissue attachment mechanically , removing all offending nail & debris. Patient relates relief following the procedure.   He will continue to monitor the areas daily, inspect his feet, wear protective shoe gear when ambulatory, moisturizer to maintain skin integrity & follow in this office in approximately 4-6 months, sooner p.r.n.        A total of 26 mins.was spent on chart review, patient visit & documentation.

## 2025-04-16 ENCOUNTER — OFFICE VISIT (OUTPATIENT)
Dept: PODIATRY | Facility: CLINIC | Age: 85
End: 2025-04-16
Payer: MEDICARE

## 2025-04-16 VITALS
DIASTOLIC BLOOD PRESSURE: 66 MMHG | BODY MASS INDEX: 18.91 KG/M2 | WEIGHT: 132.06 LBS | HEIGHT: 70 IN | SYSTOLIC BLOOD PRESSURE: 119 MMHG | HEART RATE: 61 BPM

## 2025-04-16 DIAGNOSIS — I73.9 CLAUDICATION: ICD-10-CM

## 2025-04-16 DIAGNOSIS — Z95.828 HISTORY OF ENDOVASCULAR STENT GRAFT FOR ABDOMINAL AORTIC ANEURYSM (AAA): ICD-10-CM

## 2025-04-16 DIAGNOSIS — R25.2 CRAMP OF TOE: ICD-10-CM

## 2025-04-16 DIAGNOSIS — L60.0 INGROWN NAIL: ICD-10-CM

## 2025-04-16 DIAGNOSIS — Z79.01 LONG TERM CURRENT USE OF ANTICOAGULANT: Primary | ICD-10-CM

## 2025-04-16 DIAGNOSIS — I73.9 PVD (PERIPHERAL VASCULAR DISEASE): ICD-10-CM

## 2025-04-16 DIAGNOSIS — B35.1 ONYCHOMYCOSIS OF TOENAIL: ICD-10-CM

## 2025-04-16 PROCEDURE — 99999 PR PBB SHADOW E&M-EST. PATIENT-LVL III: CPT | Mod: PBBFAC,,, | Performed by: PODIATRIST

## 2025-04-16 PROCEDURE — 1157F ADVNC CARE PLAN IN RCRD: CPT | Mod: CPTII,S$GLB,, | Performed by: PODIATRIST

## 2025-04-16 PROCEDURE — 99213 OFFICE O/P EST LOW 20 MIN: CPT | Mod: 25,S$GLB,, | Performed by: PODIATRIST

## 2025-04-16 PROCEDURE — 1126F AMNT PAIN NOTED NONE PRSNT: CPT | Mod: CPTII,S$GLB,, | Performed by: PODIATRIST

## 2025-04-16 PROCEDURE — 1101F PT FALLS ASSESS-DOCD LE1/YR: CPT | Mod: CPTII,S$GLB,, | Performed by: PODIATRIST

## 2025-04-16 PROCEDURE — 11721 DEBRIDE NAIL 6 OR MORE: CPT | Mod: Q8,S$GLB,, | Performed by: PODIATRIST

## 2025-04-16 PROCEDURE — 3288F FALL RISK ASSESSMENT DOCD: CPT | Mod: CPTII,S$GLB,, | Performed by: PODIATRIST

## 2025-04-16 PROCEDURE — 1159F MED LIST DOCD IN RCRD: CPT | Mod: CPTII,S$GLB,, | Performed by: PODIATRIST

## 2025-04-25 ENCOUNTER — HOSPITAL ENCOUNTER (OUTPATIENT)
Dept: RADIOLOGY | Facility: HOSPITAL | Age: 85
Discharge: HOME | End: 2025-04-25
Attending: RADIOLOGY
Payer: MEDICARE

## 2025-04-25 ENCOUNTER — OFFICE VISIT (OUTPATIENT)
Dept: RADIATION ONCOLOGY | Facility: CLINIC | Age: 85
End: 2025-04-25
Payer: MEDICARE

## 2025-04-25 VITALS
HEIGHT: 70 IN | WEIGHT: 128.75 LBS | DIASTOLIC BLOOD PRESSURE: 84 MMHG | HEART RATE: 57 BPM | BODY MASS INDEX: 18.43 KG/M2 | SYSTOLIC BLOOD PRESSURE: 143 MMHG | OXYGEN SATURATION: 96 % | TEMPERATURE: 98 F | RESPIRATION RATE: 19 BRPM

## 2025-04-25 DIAGNOSIS — Z85.118 PERSONAL HISTORY OF LUNG CANCER: Primary | ICD-10-CM

## 2025-04-25 DIAGNOSIS — Z85.118 PERSONAL HISTORY OF LUNG CANCER: ICD-10-CM

## 2025-04-25 PROCEDURE — 71250 CT THORAX DX C-: CPT | Mod: 26,,, | Performed by: RADIOLOGY

## 2025-04-25 PROCEDURE — 99999 PR PBB SHADOW E&M-EST. PATIENT-LVL III: CPT | Mod: PBBFAC,,, | Performed by: RADIOLOGY

## 2025-04-25 PROCEDURE — 71250 CT THORAX DX C-: CPT | Mod: TC

## 2025-04-25 NOTE — PROGRESS NOTES
4/25/2025    Radiation Oncology Follow-Up Visit      Prior Radiation History:    Site  Technique  Energy  Dose/Fx (Gy)  #Fx  Total Dose (Gy)  Start Date  End Date  Elapsed Days    RUL Lung  SBRT  6X  10  5 / 5  50  6/28/2022 7/5/2022  7        Is the patient female between ages 15-55:  no    Does the patient have a CIED:  no      Assessment   This is an 84 y.o. male with Stage IA2 (cT1b cN0 M0) RUL NSCLC (adeno) diagnosed on robotic bronch w/ EBUS 5/31/22. PET/CT 5/9/22 demonstrated uptake in RUL primary only. He completed definitive SBRT 50 Gy in 5 fx on 7/5/22.    No significant late toxicity from treatment. CT Chest today 4/25/25 demonstrates stable radiation fibrosis in the RUL by my measurements and when viewed in 3 planes. No evidence of new disease elsewhere by my review.     He is approaching 3 years since his treatment. I discussed continuing with q6 month imaging vs moving to annual scans; he prefers to continue with twice yearly evaluation.           Plan   1) I will see him back in 6 months with CT Chest prior.            CHIEF COMPLAINT: F/U after lung SBRT    HPI: Since his last visit, he had the flu in 12/2024 with symptoms persisting into January 2025. Today he is feeling back to his baseline and denies chest pain. +Chronic cough       Past Medical History:   Diagnosis Date    AAA (abdominal aortic aneurysm) without rupture     pt with h/o aaa     Acute coronary syndrome     Cancer     bladder    Carotid artery occlusion     COPD (chronic obstructive pulmonary disease)     Coronary artery disease     Enlarged prostate     History of bladder cancer     Hyperlipidemia     Hypertension     Lung abnormality     spot on lung under care of pulmonologist at [facility] Dr lua    Multiple thyroid nodules 1/31/2023    Myocardial infarct 1988    Urine retention        Past Surgical History:   Procedure Laterality Date    ABDOMINAL AORTIC ANEURYSM REPAIR  2007    BLADDER SURGERY      CARDIAC CATHETERIZATION       CORONARY ANGIOPLASTY      CORONARY STENT PLACEMENT      FRACTURE SURGERY      arm    PROSTATE SURGERY      ROBOTIC BRONCHOSCOPY N/A 5/31/2022    Procedure: ROBOTIC BRONCHOSCOPY;  Surgeon: Mickey Acosta MD;  Location: [facility] OR [address] [place] [address];  Service: Pulmonary;  Laterality: N/A;    TONSILLECTOMY         Social History     Tobacco Use    Smoking status: Every Day     Current packs/day: 0.50     Average packs/day: 0.5 packs/day for 60.0 years (30.0 ttl pk-yrs)     Types: Cigarettes    Smokeless tobacco: Never   Substance Use Topics    Alcohol use: Yes     Alcohol/week: 1.0 standard drink of alcohol     Types: 1 Shots of liquor per week     Comment:  five drink per week     Drug use: No       Cancer-related family history includes Cancer in his father.    Current Outpatient Medications on File Prior to Visit   Medication Sig Dispense Refill    albuterol (PROVENTIL) 2.5 mg /3 mL (0.083 %) nebulizer solution Take 3 mLs (2.5 mg total) by nebulization every 6 (six) hours as needed for Wheezing. Rescue 30 each 0    albuterol (PROVENTIL/VENTOLIN HFA) 90 mcg/actuation inhaler Inhale 1 puff into the lungs every 4 (four) hours as needed for Wheezing. Rescue 17 g 2    ascorbic acid, vitamin C, (VITAMIN C) 500 MG tablet Take 500 mg by mouth once daily.      aspirin (ECOTRIN) 81 MG EC tablet Take 81 mg by mouth once daily.      atenoloL (TENORMIN) 25 MG tablet Take 1 tablet (25 mg total) by mouth once daily. 90 tablet 0    atorvastatin (LIPITOR) 40 MG tablet Take 1 tablet (40 mg total) by mouth every evening. 90 tablet 0    cetirizine (ZYRTEC) 10 MG tablet Take 1 tablet (10 mg total) by mouth once daily. (Patient not taking: Reported on 4/16/2025) 30 tablet 2    clopidogreL (PLAVIX) 75 mg tablet Take 1 tablet (75 mg total) by mouth once. for 1 dose 90 tablet 0    ergocalciferol (VITAMIN D2) 50,000 unit Cap Take 1 capsule (50,000 Units total) by mouth every 7 days. 12 capsule 0    levothyroxine (SYNTHROID) 25 MCG tablet Take 1  "tablet (25 mcg total) by mouth before breakfast. 90 tablet 0    nebulizer accessories Kit 1 each by Misc.(Non-Drug; Combo Route) route daily as needed (sob/wheezing). 1 kit 0    potassium chloride (KLOR-CON) 10 MEQ TbSR TAKE 1 TABLET BY MOUTH ONCE  DAILY 90 tablet 0    tamsulosin (FLOMAX) 0.4 mg Cap Take 1 capsule (0.4 mg total) by mouth once daily. 90 capsule 0    vitamins A,C,E-zinc-copper (ICAPS AREDS) 4,296 mcg-226 mg-90 mg Cap Take 1 capsule by mouth 2 (two) times a day. (Patient not taking: Reported on 4/16/2025) 180 capsule 0     No current facility-administered medications on file prior to visit.       Review of patient's allergies indicates:  No Known Allergies      Vital Signs: BP (!) 143/84   Pulse (!) 57   Temp 97.6 °F (36.4 °C)   Resp 19   Ht 5' 10" (1.778 m)   Wt 58.4 kg (128 lb 12 oz)   SpO2 96%   BMI 18.47 kg/m²     ECOG Performance Status: 2    Physical Exam  Vitals reviewed.   Constitutional:       Appearance: Normal appearance.   HENT:      Head: Normocephalic and atraumatic.   Eyes:      General: No scleral icterus.     Extraocular Movements: Extraocular movements intact.   Pulmonary:      Effort: Pulmonary effort is normal. No respiratory distress.   Abdominal:      General: There is no distension.   Musculoskeletal:      Cervical back: Neck supple.   Lymphadenopathy:      Cervical: No cervical adenopathy.   Skin:     General: Skin is warm and dry.   Neurological:      General: No focal deficit present.      Mental Status: He is alert and oriented to person, place, and time.   Psychiatric:         Mood and Affect: Mood normal.         Behavior: Behavior normal.         Judgment: Judgment normal.          Labs:    Imaging: I have personally reviewed the patient's available images and reports and summarized pertinent findings above in HPI.     Pathology: No new path              "

## 2025-04-29 NOTE — PROCEDURES
Nail debridement    Date/Time: 4/16/2025 2:45 PM    Performed by: Monica Tijerina DPM  Authorized by: Monica Tijerina DPM    Consent Done?:  Yes (Verbal)    Nail Care Type:  Debride  Location(s): All  (Left 1st Toe, Left 3rd Toe, Left 2nd Toe, Left 4th Toe, Left 5th Toe, Right 1st Toe, Right 2nd Toe, Right 3rd Toe, Right 4th Toe and Right 5th Toe)  Patient tolerance:  Patient tolerated the procedure well with no immediate complications

## 2025-05-08 ENCOUNTER — TELEPHONE (OUTPATIENT)
Dept: UROLOGY | Facility: CLINIC | Age: 85
End: 2025-05-08
Payer: MEDICARE

## 2025-05-08 NOTE — TELEPHONE ENCOUNTER
----- Message from Christina sent at 5/8/2025  8:13 AM CDT -----  Regarding: Patient advice  Contact: Pt  [phone]  Name of Caller:  Martell  Contact Preference:  [phone] of Call:  Requesting a call back states he is passing a lot of blood with urination is concerned no other information given

## 2025-05-08 NOTE — TELEPHONE ENCOUNTER
Attempted to call patient.  Left voicemail informing patient to give us a call back at his earliest convenience.  Phone number provided.    KINGSLEY Leonard

## 2025-05-09 ENCOUNTER — TELEPHONE (OUTPATIENT)
Dept: UROLOGY | Facility: CLINIC | Age: 85
End: 2025-05-09
Payer: MEDICARE

## 2025-05-09 NOTE — TELEPHONE ENCOUNTER
Called and spoke with patient, answered all of his questions. Urine color is lighter today, some clots. No pain are blockage of urine, stream is good. Told patient if he gets any pain, blood or blockage of stream to go to the ER. Patient states verbal understanding.

## 2025-05-09 NOTE — TELEPHONE ENCOUNTER
----- Message from Nurse Marie sent at 5/8/2025  4:41 PM CDT -----  Regarding: FW: Callback  Contact: [phone]    ----- Message -----  From: Thi Cruz  Sent: 5/8/2025   4:38 PM CDT  To: Edward Javier Staff  Subject: Callback                                         Patient calling requesting a callback from nurse or provider in regards to passing blood and need to speak with the provider to see what he should do. Please call back as soon as possible.

## 2025-05-09 NOTE — TELEPHONE ENCOUNTER
----- Message from Nurse Marie sent at 5/8/2025  4:41 PM CDT -----  Regarding: FW: Callback  Contact: [phone]    ----- Message -----  From: Thi Cruz  Sent: 5/8/2025   4:38 PM CDT  To: Edward Javier Staff  Subject: Callback                                         Patient calling requesting a callback from nurse or provider in regards to passing blood and need to speak with the provider to see what he should do. Please call back as soon as possible.

## 2025-06-03 ENCOUNTER — OFFICE VISIT (OUTPATIENT)
Dept: CARDIOLOGY | Facility: CLINIC | Age: 85
End: 2025-06-03
Payer: MEDICARE

## 2025-06-03 VITALS
HEIGHT: 70 IN | BODY MASS INDEX: 18.47 KG/M2 | HEART RATE: 55 BPM | OXYGEN SATURATION: 95 % | DIASTOLIC BLOOD PRESSURE: 70 MMHG | SYSTOLIC BLOOD PRESSURE: 124 MMHG

## 2025-06-03 DIAGNOSIS — F17.200 TOBACCO DEPENDENCE: ICD-10-CM

## 2025-06-03 DIAGNOSIS — I65.23 CAROTID STENOSIS, ASYMPTOMATIC, BILATERAL: ICD-10-CM

## 2025-06-03 DIAGNOSIS — I25.10 CORONARY ARTERY DISEASE INVOLVING NATIVE CORONARY ARTERY OF NATIVE HEART WITHOUT ANGINA PECTORIS: Chronic | ICD-10-CM

## 2025-06-03 DIAGNOSIS — Z85.51 HISTORY OF BLADDER CANCER: ICD-10-CM

## 2025-06-03 DIAGNOSIS — E78.00 HYPERCHOLESTEREMIA: Chronic | ICD-10-CM

## 2025-06-03 DIAGNOSIS — I10 HYPERTENSION, UNSPECIFIED TYPE: Primary | Chronic | ICD-10-CM

## 2025-06-03 DIAGNOSIS — I45.10 RBBB: ICD-10-CM

## 2025-06-03 DIAGNOSIS — Z98.61 S/P PTCA (PERCUTANEOUS TRANSLUMINAL CORONARY ANGIOPLASTY): ICD-10-CM

## 2025-06-03 DIAGNOSIS — R91.1 LUNG NODULE: ICD-10-CM

## 2025-06-03 DIAGNOSIS — Z95.828 HISTORY OF ENDOVASCULAR STENT GRAFT FOR ABDOMINAL AORTIC ANEURYSM (AAA): ICD-10-CM

## 2025-06-03 DIAGNOSIS — I71.40 ABDOMINAL AORTIC ANEURYSM (AAA) WITHOUT RUPTURE, UNSPECIFIED PART: ICD-10-CM

## 2025-06-03 DIAGNOSIS — I25.2 HX OF MYOCARDIAL INFARCTION: ICD-10-CM

## 2025-06-03 DIAGNOSIS — I70.0 AORTIC ARCH ATHEROSCLEROSIS: ICD-10-CM

## 2025-06-03 DIAGNOSIS — I73.9 CLAUDICATION: ICD-10-CM

## 2025-06-03 DIAGNOSIS — I73.9 PVD (PERIPHERAL VASCULAR DISEASE): ICD-10-CM

## 2025-06-03 PROCEDURE — 99999 PR PBB SHADOW E&M-EST. PATIENT-LVL IV: CPT | Mod: PBBFAC,,,

## 2025-06-09 ENCOUNTER — TELEPHONE (OUTPATIENT)
Dept: PULMONOLOGY | Facility: CLINIC | Age: 85
End: 2025-06-09
Payer: MEDICARE

## 2025-06-09 ENCOUNTER — TELEPHONE (OUTPATIENT)
Dept: PRIMARY CARE CLINIC | Facility: CLINIC | Age: 85
End: 2025-06-09
Payer: MEDICARE

## 2025-06-09 NOTE — TELEPHONE ENCOUNTER
Copied from CRM #[id number]. Topic: Appointments - Appointment Access  >> Jun 9, 2025  9:29 AM Stephon wrote:  Consult/Advisory    Name Of Caller:Martell       Contact Preference:[phone]     Nature of call: Pt is requesting a call regarding his appt please call to assist

## 2025-06-09 NOTE — TELEPHONE ENCOUNTER
"Copied from CRM #[id number]. Topic: General Inquiry - Patient Advice  >> Jun 9, 2025 10:27 AM Kiya wrote:  .Name Of Caller: Self     Contact Preference?:[phone]      What is the nature of the call?: Returning call to freddie Chaudhari call      Additional Notes:  "Thank you for all that you do for our patients"  "

## 2025-06-12 ENCOUNTER — OFFICE VISIT (OUTPATIENT)
Dept: PULMONOLOGY | Facility: CLINIC | Age: 85
End: 2025-06-12
Payer: MEDICARE

## 2025-06-12 VITALS
WEIGHT: 126 LBS | HEIGHT: 70 IN | HEART RATE: 58 BPM | OXYGEN SATURATION: 96 % | BODY MASS INDEX: 18.04 KG/M2 | DIASTOLIC BLOOD PRESSURE: 73 MMHG | SYSTOLIC BLOOD PRESSURE: 116 MMHG

## 2025-06-12 DIAGNOSIS — R91.1 LUNG NODULE: ICD-10-CM

## 2025-06-12 DIAGNOSIS — J43.2 CENTRILOBULAR EMPHYSEMA: Primary | ICD-10-CM

## 2025-06-12 DIAGNOSIS — F17.200 TOBACCO DEPENDENCE: ICD-10-CM

## 2025-06-12 DIAGNOSIS — C34.11 PRIMARY MALIGNANT NEOPLASM OF RIGHT UPPER LOBE OF LUNG: ICD-10-CM

## 2025-06-12 PROCEDURE — 1157F ADVNC CARE PLAN IN RCRD: CPT | Mod: CPTII,S$GLB,,

## 2025-06-12 PROCEDURE — 1126F AMNT PAIN NOTED NONE PRSNT: CPT | Mod: CPTII,S$GLB,,

## 2025-06-12 PROCEDURE — 99999 PR PBB SHADOW E&M-EST. PATIENT-LVL IV: CPT | Mod: PBBFAC,,,

## 2025-06-12 PROCEDURE — 3288F FALL RISK ASSESSMENT DOCD: CPT | Mod: CPTII,S$GLB,,

## 2025-06-12 PROCEDURE — 99205 OFFICE O/P NEW HI 60 MIN: CPT | Mod: S$GLB,,,

## 2025-06-12 PROCEDURE — 1101F PT FALLS ASSESS-DOCD LE1/YR: CPT | Mod: CPTII,S$GLB,,

## 2025-06-12 RX ORDER — FLUTICASONE FUROATE, UMECLIDINIUM BROMIDE AND VILANTEROL TRIFENATATE 100; 62.5; 25 UG/1; UG/1; UG/1
1 POWDER RESPIRATORY (INHALATION) DAILY
Qty: 60 EACH | Refills: 11 | Status: SHIPPED | OUTPATIENT
Start: 2025-06-12

## 2025-06-12 RX ORDER — FLUTICASONE FUROATE, UMECLIDINIUM BROMIDE AND VILANTEROL TRIFENATATE 100; 62.5; 25 UG/1; UG/1; UG/1
1 POWDER RESPIRATORY (INHALATION) DAILY
Qty: 60 EACH | Refills: 11 | Status: SHIPPED | OUTPATIENT
Start: 2025-06-12 | End: 2025-06-12

## 2025-06-12 NOTE — PROGRESS NOTES
"History and Physical Note  [facility]    Subjective:     Reason for visit: COPD    Patient ID:  Martell Whitaker is a 84 y.o. male    Interval History 2025:  Patient presents today to establish care for COPD.   He reports minimal coughing and shortness of breath. She was diagnosed with moderate COPD 3 years ago, experiencing morning shortness of breath that typically resolves with albuterol use. He also notes shortness of breath with significant exertion but denies constant shortness of breath or daily symptoms.  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 PET/CT 22 demonstrated uptake in RUL primary only. He completed definitive SBRT 50 Gy in 5 fx on 22. Latest CT chest with stable radiation fibrosis. He will be followed bi-annually with CT chest.     He uses albuterol daily in the morning with good response for the remainder of the day. She has a nebulizer available for as-needed use but reports minimal symptomatic improvement with nebulizer treatments.    He started smoking at age 13 and currently smokes half pack per day. 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      Appearance: Normal appearance. He is well-developed.   HENT:      " Head: Normocephalic.   Pulmonary:      Effort: Pulmonary effort is normal. No respiratory distress.      Breath sounds: Decreased air movement present. No wheezing.   Skin:     General: Skin is warm.      Capillary Refill: Capillary refill takes less than 2 seconds.      Findings: No rash.      Nails: There is no clubbing.   Neurological:      Mental Status: He is alert.   Psychiatric:         Attention and Perception: Attention and perception normal.         Behavior: Behavior is cooperative.          Personal Diagnostic Review and Interpretation  04/25/2025 CT chest  FINDINGS:  Chest wall and lower neck: No axillary or supraclavicular lymphadenopathy.Paravertebral nodules in the left posterior hemithorax, unchanged.     Lungs and pleura: Right upper lobe spiculated mass measuring approximately 2.3 x 3.9 cm, prior 2.4 x 3.7 cm).  Emphysema and additional pulmonary nodules are stable since prior.  No pleural effusion is seen.     Mediastinum and bryson: 13 mm node in the station 4R, unchanged.     Heart and pericardium: Heart size is normal.No pericardial effusion.     Vessels: Mild to moderate atheromatous disease is seen in the aorta.   The coronary arteries show moderate atheromatous disease.     Upper abdomen: Bilobar hepatic cysts.  Renal cysts, partially included.  Aortic atheromatous disease.  Status post EVAR.     Bones: Unchanged since prior.         Pertinent Studies Reviewed & Interpreted:     Pulmonary Function Tests:   06/2022 PFT  Moderate obstruction FEV1 64% + BD resposne  DLCO severely decreased     Echocardiograms:   TTE 10/2021  The left ventricle is normal in size with mild concentric hypertrophy and mildly decreased systolic function.  The estimated ejection fraction is 45%.  Grade I left ventricular diastolic dysfunction.  There are segmental left ventricular wall motion abnormalities.  Normal central venous pressure (3 mmHg).  The estimated PA systolic pressure is 29 mmHg.  Mild tricuspid  regurgitation.  Normal right ventricular size with normal right ventricular systolic function.     Other Pertinent Laboratories:  Lab Results   Component Value Date    WBC 8.40 02/12/2025    RBC 4.11 (L) 02/12/2025    HGB 12.7 (L) 02/12/2025    MCV 99 (H) 02/12/2025    MCH 30.9 02/12/2025    MCHC 31.3 (L) 02/12/2025    RDW 13.9 02/12/2025     02/12/2025    MPV 9.3 02/12/2025    GRAN 6.3 02/12/2025    GRAN 75.0 (H) 02/12/2025    LYMPH 1.1 02/12/2025    LYMPH 13.5 (L) 02/12/2025    MONO 0.7 02/12/2025    MONO 8.7 02/12/2025    EOS 0.2 02/12/2025    BASO 0.04 02/12/2025         Assessment & Plan:       Plan:  Clinical impression is resonably certain and repeated evaluation prn +/- follow up will be needed as below.      1. Centrilobular emphysema  Overview:        Assessment & Plan:  Significant emphysema changes  Moderate obstruction FEV1 65% 2022  He would benefit from daily inhaler  We would like to defer conversation on smoking cessation.    Orders:  -     Discontinue: fluticasone-umeclidin-vilanter (TRELEGY ELLIPTA) 100-62.5-25 mcg DsDv; Inhale 1 puff into the lungs once daily.  Dispense: 60 each; Refill: 11  -     fluticasone-umeclidin-vilanter (TRELEGY ELLIPTA) 100-62.5-25 mcg DsDv; Inhale 1 puff into the lungs once daily.  Dispense: 60 each; Refill: 11    2. Lung nodule  -     Ambulatory referral/consult to Pulmonology    3. 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 Tobacco dependence  Assessment & Plan:  The patient politely states that he understands the risks associated with continued tobacco use, including its contribution to his prior diagnosis of right upper lobe lung carcinoma and ongoing COPD. At this time, he respectfully declines further discussion about smoking cessation, expressing appreciation for the information but indicating that he is not ready to pursue quitting. He was reassured that support and resources remain available if he decides to reconsider in the future. Will continue  to monitor and offer encouragement as appropriate.          RETURN TO CLINIC IN 3 MONTHS     Total professional time spent for the encounter: 60 minutes  Time was spent preparing to see the patient, reviewing results of prior testing, obtaining and/or reviewing separately obtained history, performing a medically appropriate examination and interview, counseling and educating the patient/family, ordering medications/tests/procedures, referring and communicating with other health care professionals, documenting clinical information in the electronic health record, and independently interpreting results.    Kamilla Pittman DNP         [1]   Social History  Tobacco Use   Smoking Status Every Day    Current packs/day: 0.50    Average packs/day: 0.5 packs/day for 73.5 years (36.7 ttl pk-yrs)    Types: Cigarettes    Start date: 1954   Smokeless Tobacco Never

## 2025-06-18 NOTE — ASSESSMENT & PLAN NOTE
The patient politely states that he understands the risks associated with continued tobacco use, including its contribution to his prior diagnosis of right upper lobe lung carcinoma and ongoing COPD. At this time, he respectfully declines further discussion about smoking cessation, expressing appreciation for the information but indicating that he is not ready to pursue quitting. He was reassured that support and resources remain available if he decides to reconsider in the future. Will continue to monitor and offer encouragement as appropriate.

## 2025-06-18 NOTE — ASSESSMENT & PLAN NOTE
Significant emphysema changes  Moderate obstruction FEV1 65% 2022  He would benefit from daily inhaler  We would like to defer conversation on smoking cessation.

## 2025-07-30 ENCOUNTER — TELEPHONE (OUTPATIENT)
Dept: PULMONOLOGY | Facility: CLINIC | Age: 85
End: 2025-07-30
Payer: MEDICARE

## 2025-07-30 NOTE — TELEPHONE ENCOUNTER
Returned called to Pt, Pt wanted to know the best time to take his fluticasone-umeclidin-vilanter (TRELEGY ELLIPTA) 100-62.5-25 mcg it was shared that whenever he feels whats the best time for him, Pt shared at night right before he goes to bed. CF

## 2025-08-06 ENCOUNTER — OFFICE VISIT (OUTPATIENT)
Dept: UROLOGY | Facility: CLINIC | Age: 85
End: 2025-08-06
Payer: MEDICARE

## 2025-08-06 VITALS
BODY MASS INDEX: 18.02 KG/M2 | HEART RATE: 59 BPM | HEIGHT: 70 IN | DIASTOLIC BLOOD PRESSURE: 75 MMHG | SYSTOLIC BLOOD PRESSURE: 114 MMHG | WEIGHT: 125.88 LBS

## 2025-08-06 DIAGNOSIS — N47.1 PHIMOSIS OF PENIS: ICD-10-CM

## 2025-08-06 DIAGNOSIS — N40.1 BPH WITH OBSTRUCTION/LOWER URINARY TRACT SYMPTOMS: ICD-10-CM

## 2025-08-06 DIAGNOSIS — N13.8 BPH WITH OBSTRUCTION/LOWER URINARY TRACT SYMPTOMS: ICD-10-CM

## 2025-08-06 DIAGNOSIS — R31.0 GROSS HEMATURIA: Primary | ICD-10-CM

## 2025-08-06 LAB
BILIRUB SERPL-MCNC: NORMAL MG/DL
BLOOD URINE, POC: NORMAL
CLARITY, POC UA: NORMAL
COLOR, POC UA: NORMAL
GLUCOSE UR QL STRIP: NORMAL
KETONES UR QL STRIP: NORMAL
LEUKOCYTE ESTERASE URINE, POC: NORMAL
NITRITE, POC UA: NORMAL
PH, POC UA: NORMAL
POC RESIDUAL URINE VOLUME: 0 ML (ref 0–100)
PROTEIN, POC: NORMAL
SPECIFIC GRAVITY, POC UA: NORMAL
UROBILINOGEN, POC UA: NORMAL

## 2025-08-06 PROCEDURE — 1126F AMNT PAIN NOTED NONE PRSNT: CPT | Mod: CPTII,S$GLB,, | Performed by: STUDENT IN AN ORGANIZED HEALTH CARE EDUCATION/TRAINING PROGRAM

## 2025-08-06 PROCEDURE — 51798 US URINE CAPACITY MEASURE: CPT | Mod: S$GLB,,, | Performed by: STUDENT IN AN ORGANIZED HEALTH CARE EDUCATION/TRAINING PROGRAM

## 2025-08-06 PROCEDURE — 99999 PR PBB SHADOW E&M-EST. PATIENT-LVL V: CPT | Mod: PBBFAC,,, | Performed by: STUDENT IN AN ORGANIZED HEALTH CARE EDUCATION/TRAINING PROGRAM

## 2025-08-06 PROCEDURE — 1157F ADVNC CARE PLAN IN RCRD: CPT | Mod: CPTII,S$GLB,, | Performed by: STUDENT IN AN ORGANIZED HEALTH CARE EDUCATION/TRAINING PROGRAM

## 2025-08-06 PROCEDURE — 1159F MED LIST DOCD IN RCRD: CPT | Mod: CPTII,S$GLB,, | Performed by: STUDENT IN AN ORGANIZED HEALTH CARE EDUCATION/TRAINING PROGRAM

## 2025-08-06 PROCEDURE — 81002 URINALYSIS NONAUTO W/O SCOPE: CPT | Mod: S$GLB,,, | Performed by: STUDENT IN AN ORGANIZED HEALTH CARE EDUCATION/TRAINING PROGRAM

## 2025-08-06 PROCEDURE — 99214 OFFICE O/P EST MOD 30 MIN: CPT | Mod: S$GLB,,, | Performed by: STUDENT IN AN ORGANIZED HEALTH CARE EDUCATION/TRAINING PROGRAM

## 2025-08-06 RX ORDER — FINASTERIDE 5 MG/1
5 TABLET, FILM COATED ORAL DAILY
Qty: 30 TABLET | Refills: 11 | Status: SHIPPED | OUTPATIENT
Start: 2025-08-06 | End: 2026-08-06

## 2025-08-06 RX ORDER — FINASTERIDE 5 MG/1
5 TABLET, FILM COATED ORAL DAILY
Qty: 30 TABLET | Refills: 11 | Status: SHIPPED | OUTPATIENT
Start: 2025-08-06 | End: 2025-08-06

## 2025-08-06 NOTE — PROGRESS NOTES
"[facility] [address]   Clinic Note    SUBJECTIVE:     Chief Complaint: gross hematuria    History of Present Illness:  Martell Whitaker is a 84 y.o. male who presents to clinic for gross hematuria. He is established to our clinic but new to me.     He has a reported history of bladder cancer >10 years ago; these records are unavailable.   Seen last by MORENA Leon 08/2024 for hematuria with (possible) clot retention. Passed VT then. Had negative hematuria workup in 2024.    He had gross hematuria workup with me in 03/2025 which was negative apart from prostatomegaly.  Has LUTS, well controlled. Taking Flomax.  Has phimosis >1 year; tried Lotrisone for 6 months without improvement. Declined circumcision or dorsal slit.    He went to the ED 8/2/25 with gross hematuria and inability to urinate, but was able to urinate while in the ED. CT shows a new mass in the left lung.    He previously tried finasteride but stopped due to gynecomastia.    He is now interested in surgical correction of phimosis.        Anticoagulation:  Yes - ASA and Plavix.     OBJECTIVE:     Estimated body mass index is 18.06 kg/m² as calculated from the following:    Height as of this encounter: 5' 10" (1.778 m).    Weight as of this encounter: 57.1 kg (125 lb 14.1 oz).    Vital Signs (Most Recent)  Pulse: (!) 59 (08/06/25 0826)  BP: 114/75 (08/06/25 0826)    Physical Exam  Vitals reviewed.   Constitutional:       Appearance: Normal appearance.   HENT:      Head: Normocephalic and atraumatic.   Cardiovascular:      Rate and Rhythm: Normal rate.   Pulmonary:      Effort: Pulmonary effort is normal.   Genitourinary:     Comments: Uncircumcised penis with phimotic foreskin, unable to retract to expose glans; testes descended bilaterally, atrophic  Musculoskeletal:         General: Normal range of motion.   Skin:     General: Skin is warm and dry.   Neurological:      General: No focal deficit present.      Mental Status: He is alert and " oriented to person, place, and time.   Psychiatric:         Mood and Affect: Mood normal.         Behavior: Behavior normal.         Thought Content: Thought content normal.         Judgment: Judgment normal.         Lab Results   Component Value Date    BUN 19 02/12/2025    CREATININE 0.9 02/12/2025    WBC 8.40 02/12/2025    HGB 12.7 (L) 02/12/2025    HCT 40.6 02/12/2025     02/12/2025    AST 15 02/12/2025    ALT 14 02/12/2025    ALKPHOS 92 02/12/2025    ALBUMIN 3.4 (L) 02/12/2025    HGBA1C 5.2 10/03/2024        Lab Results   Component Value Date    PSA 1.4 03/04/2019    PSADIAG 1.22 12/20/2022    PSADIAG 1.0 11/24/2020    PSADIAG 3.7 03/22/2017    PSADIAG 1.8 11/25/2014     Bladder scan PVR - 0 cc    ASSESSMENT     1. Gross hematuria    2. BPH with obstruction/lower urinary tract symptoms    3. Phimosis of penis        PLAN:   1. Gross hematuria  -     POCT Bladder Scan  -     POCT urine dipstick without microscope  -     Creatinine, Serum; Future; Expected date: 08/06/2025  -     CTA Pelvis; Future; Expected date: 08/06/2025  -     Ambulatory referral/consult to Interventional Radiology; Future; Expected date: 08/13/2025  -     Urine Culture High Risk ($$)    2. BPH with obstruction/lower urinary tract symptoms  -     POCT Bladder Scan  -     POCT urine dipstick without microscope  -     Creatinine, Serum; Future; Expected date: 08/06/2025  -     CTA Pelvis; Future; Expected date: 08/06/2025  -     Ambulatory referral/consult to Interventional Radiology; Future; Expected date: 08/13/2025    3. Phimosis of penis  -     Case Request Operating Room: DORSAL SLIT, PREPUCE    Other orders  -     finasteride (PROSCAR) 5 mg tablet; Take 1 tablet (5 mg total) by mouth once daily.  Dispense: 30 tablet; Refill: 11           Given recurrent presentations to ED for hematuria due to BPH, will restart finasteride and refer to IR for prostatic artery embolization.    Will schedule dorsal slit. Hold ASA x 7 days, Plavix x  5 days prior; will contact cardiology (MORENA Saeed) for clearance.    Patient instructed to schedule visit with PCP to discuss new nodule in left lung.    Urine sent for culture.    Eriberto Rose MD     I spent  45 minutes on this visit, including: chart review; discussion with patient and family members; physical examination; documentation; placement of orders; and communication with other healthcare professionals.

## 2025-08-08 LAB — BACTERIA UR CULT: ABNORMAL

## 2025-08-08 RX ORDER — SULFAMETHOXAZOLE AND TRIMETHOPRIM 800; 160 MG/1; MG/1
1 TABLET ORAL 2 TIMES DAILY
Qty: 14 TABLET | Refills: 0 | Status: SHIPPED | OUTPATIENT
Start: 2025-08-08 | End: 2025-08-15

## 2025-08-08 RX ORDER — SULFAMETHOXAZOLE AND TRIMETHOPRIM 800; 160 MG/1; MG/1
1 TABLET ORAL 2 TIMES DAILY
Qty: 14 TABLET | Refills: 0 | Status: SHIPPED | OUTPATIENT
Start: 2025-08-08 | End: 2025-08-08 | Stop reason: SDUPTHER

## 2025-08-18 ENCOUNTER — TELEPHONE (OUTPATIENT)
Dept: UROLOGY | Facility: CLINIC | Age: 85
End: 2025-08-18
Payer: MEDICARE

## 2025-08-18 ENCOUNTER — ANESTHESIA EVENT (OUTPATIENT)
Dept: SURGERY | Facility: OTHER | Age: 85
End: 2025-08-18
Payer: MEDICARE

## 2025-08-19 ENCOUNTER — HOSPITAL ENCOUNTER (OUTPATIENT)
Facility: OTHER | Age: 85
Discharge: HOME | End: 2025-08-19
Attending: STUDENT IN AN ORGANIZED HEALTH CARE EDUCATION/TRAINING PROGRAM | Admitting: STUDENT IN AN ORGANIZED HEALTH CARE EDUCATION/TRAINING PROGRAM
Payer: MEDICARE

## 2025-08-19 ENCOUNTER — TELEPHONE (OUTPATIENT)
Dept: UROLOGY | Facility: CLINIC | Age: 85
End: 2025-08-19
Payer: MEDICARE

## 2025-08-19 ENCOUNTER — ANESTHESIA (OUTPATIENT)
Dept: SURGERY | Facility: OTHER | Age: 85
End: 2025-08-19
Payer: MEDICARE

## 2025-08-19 DIAGNOSIS — N47.1 PHIMOSIS OF PENIS: Primary | ICD-10-CM

## 2025-08-19 LAB
BILIRUBIN, POC UA: NEGATIVE
BLOOD, POC UA: ABNORMAL
CLARITY, UA: CLEAR
COLOR, UA: YELLOW
GLUCOSE, POC UA: NEGATIVE
KETONES, POC UA: NEGATIVE
LEUKOCYTE EST, POC UA: ABNORMAL
NITRITE, POC UA: NEGATIVE
PH UR STRIP: 6 [PH] (ref 5–8)
PROTEIN, POC UA: 100 MG/DL
SPECIFIC GRAVITY, POC UA: 1.01 (ref 1–1.03)
UROBILINOGEN, POC UA: 0.2 E.U./DL

## 2025-08-19 PROCEDURE — 37000009 HC ANESTHESIA EA ADD 15 MINS: Performed by: STUDENT IN AN ORGANIZED HEALTH CARE EDUCATION/TRAINING PROGRAM

## 2025-08-19 PROCEDURE — 37000008 HC ANESTHESIA 1ST 15 MINUTES: Performed by: STUDENT IN AN ORGANIZED HEALTH CARE EDUCATION/TRAINING PROGRAM

## 2025-08-19 PROCEDURE — 63600175 PHARM REV CODE 636 W HCPCS: Performed by: STUDENT IN AN ORGANIZED HEALTH CARE EDUCATION/TRAINING PROGRAM

## 2025-08-19 PROCEDURE — 36000707: Performed by: STUDENT IN AN ORGANIZED HEALTH CARE EDUCATION/TRAINING PROGRAM

## 2025-08-19 PROCEDURE — 71000015 HC POSTOP RECOV 1ST HR: Performed by: STUDENT IN AN ORGANIZED HEALTH CARE EDUCATION/TRAINING PROGRAM

## 2025-08-19 PROCEDURE — 25000003 PHARM REV CODE 250: Performed by: NURSE ANESTHETIST, CERTIFIED REGISTERED

## 2025-08-19 PROCEDURE — 63600175 PHARM REV CODE 636 W HCPCS: Performed by: NURSE ANESTHETIST, CERTIFIED REGISTERED

## 2025-08-19 PROCEDURE — 54001 SLITTING OF PREPUCE: CPT | Mod: ,,, | Performed by: STUDENT IN AN ORGANIZED HEALTH CARE EDUCATION/TRAINING PROGRAM

## 2025-08-19 PROCEDURE — 71000016 HC POSTOP RECOV ADDL HR: Performed by: STUDENT IN AN ORGANIZED HEALTH CARE EDUCATION/TRAINING PROGRAM

## 2025-08-19 PROCEDURE — 36000706: Performed by: STUDENT IN AN ORGANIZED HEALTH CARE EDUCATION/TRAINING PROGRAM

## 2025-08-19 PROCEDURE — 63600175 PHARM REV CODE 636 W HCPCS

## 2025-08-19 RX ORDER — PROPOFOL 10 MG/ML
VIAL (ML) INTRAVENOUS
Status: DISCONTINUED | OUTPATIENT
Start: 2025-08-19 | End: 2025-08-19

## 2025-08-19 RX ORDER — OXYCODONE AND ACETAMINOPHEN 5; 325 MG/1; MG/1
1 TABLET ORAL EVERY 4 HOURS PRN
Qty: 10 TABLET | Refills: 0 | Status: SHIPPED | OUTPATIENT
Start: 2025-08-19

## 2025-08-19 RX ORDER — HYDROMORPHONE HYDROCHLORIDE 2 MG/ML
0.4 INJECTION, SOLUTION INTRAMUSCULAR; INTRAVENOUS; SUBCUTANEOUS EVERY 5 MIN PRN
Status: DISCONTINUED | OUTPATIENT
Start: 2025-08-19 | End: 2025-08-19 | Stop reason: HOSPADM

## 2025-08-19 RX ORDER — LIDOCAINE HYDROCHLORIDE 20 MG/ML
INJECTION INTRAVENOUS
Status: DISCONTINUED | OUTPATIENT
Start: 2025-08-19 | End: 2025-08-19

## 2025-08-19 RX ORDER — CEFAZOLIN 2 G/1
2 INJECTION, POWDER, FOR SOLUTION INTRAMUSCULAR; INTRAVENOUS
Status: COMPLETED | OUTPATIENT
Start: 2025-08-19 | End: 2025-08-19

## 2025-08-19 RX ORDER — BUPIVACAINE HYDROCHLORIDE 2.5 MG/ML
INJECTION, SOLUTION EPIDURAL; INFILTRATION; INTRACAUDAL; PERINEURAL
Status: DISCONTINUED | OUTPATIENT
Start: 2025-08-19 | End: 2025-08-19 | Stop reason: HOSPADM

## 2025-08-19 RX ORDER — OXYCODONE HYDROCHLORIDE 5 MG/1
5 TABLET ORAL
Status: DISCONTINUED | OUTPATIENT
Start: 2025-08-19 | End: 2025-08-19 | Stop reason: HOSPADM

## 2025-08-19 RX ORDER — PHENYLEPHRINE HYDROCHLORIDE 10 MG/ML
INJECTION INTRAVENOUS
Status: DISCONTINUED | OUTPATIENT
Start: 2025-08-19 | End: 2025-08-19

## 2025-08-19 RX ORDER — PROCHLORPERAZINE EDISYLATE 5 MG/ML
5 INJECTION INTRAMUSCULAR; INTRAVENOUS EVERY 30 MIN PRN
Status: DISCONTINUED | OUTPATIENT
Start: 2025-08-19 | End: 2025-08-19 | Stop reason: HOSPADM

## 2025-08-19 RX ORDER — SODIUM CHLORIDE 0.9 % (FLUSH) 0.9 %
3 SYRINGE (ML) INJECTION
Status: DISCONTINUED | OUTPATIENT
Start: 2025-08-19 | End: 2025-08-19 | Stop reason: HOSPADM

## 2025-08-19 RX ORDER — GLUCAGON 1 MG
1 KIT INJECTION
Status: DISCONTINUED | OUTPATIENT
Start: 2025-08-19 | End: 2025-08-19 | Stop reason: HOSPADM

## 2025-08-19 RX ORDER — FENTANYL CITRATE 50 UG/ML
INJECTION, SOLUTION INTRAMUSCULAR; INTRAVENOUS
Status: DISCONTINUED | OUTPATIENT
Start: 2025-08-19 | End: 2025-08-19

## 2025-08-19 RX ORDER — ETOMIDATE 2 MG/ML
INJECTION INTRAVENOUS
Status: DISCONTINUED | OUTPATIENT
Start: 2025-08-19 | End: 2025-08-19

## 2025-08-19 RX ADMIN — ETOMIDATE 2 MG: 2 INJECTION, SOLUTION INTRAVENOUS at 07:08

## 2025-08-19 RX ADMIN — PHENYLEPHRINE HYDROCHLORIDE 50 MCG: 10 INJECTION INTRAVENOUS at 08:08

## 2025-08-19 RX ADMIN — FENTANYL CITRATE 50 MCG: 50 INJECTION, SOLUTION INTRAMUSCULAR; INTRAVENOUS at 07:08

## 2025-08-19 RX ADMIN — GLYCOPYRROLATE 0.2 MG: 0.2 INJECTION, SOLUTION INTRAMUSCULAR; INTRAVITREAL at 07:08

## 2025-08-19 RX ADMIN — PHENYLEPHRINE HYDROCHLORIDE 50 MCG: 10 INJECTION INTRAVENOUS at 07:08

## 2025-08-19 RX ADMIN — SODIUM CHLORIDE, POTASSIUM CHLORIDE, SODIUM LACTATE AND CALCIUM CHLORIDE: 600; 310; 30; 20 INJECTION, SOLUTION INTRAVENOUS at 07:08

## 2025-08-19 RX ADMIN — CEFAZOLIN 2 G: 2 INJECTION, POWDER, FOR SOLUTION INTRAMUSCULAR; INTRAVENOUS at 07:08

## 2025-08-19 RX ADMIN — ETOMIDATE 2 MG: 2 INJECTION, SOLUTION INTRAVENOUS at 08:08

## 2025-08-19 RX ADMIN — LIDOCAINE HYDROCHLORIDE 50 MG: 20 INJECTION, SOLUTION INTRAVENOUS at 07:08

## 2025-08-19 RX ADMIN — PROPOFOL 20 MG: 10 INJECTION, EMULSION INTRAVENOUS at 07:08

## 2025-08-20 ENCOUNTER — TELEPHONE (OUTPATIENT)
Dept: UROLOGY | Facility: CLINIC | Age: 85
End: 2025-08-20
Payer: MEDICARE

## 2025-08-20 VITALS
SYSTOLIC BLOOD PRESSURE: 112 MMHG | DIASTOLIC BLOOD PRESSURE: 65 MMHG | BODY MASS INDEX: 17.9 KG/M2 | WEIGHT: 125 LBS | HEART RATE: 52 BPM | TEMPERATURE: 98 F | OXYGEN SATURATION: 98 % | RESPIRATION RATE: 16 BRPM | HEIGHT: 70 IN

## 2025-08-21 ENCOUNTER — OFFICE VISIT (OUTPATIENT)
Dept: PODIATRY | Facility: CLINIC | Age: 85
End: 2025-08-21
Payer: MEDICARE

## 2025-08-21 VITALS
DIASTOLIC BLOOD PRESSURE: 67 MMHG | HEIGHT: 70 IN | HEART RATE: 59 BPM | WEIGHT: 124.88 LBS | BODY MASS INDEX: 17.88 KG/M2 | SYSTOLIC BLOOD PRESSURE: 108 MMHG

## 2025-08-21 DIAGNOSIS — S90.222A CONTUSION OF LEFT LESSER TOE(S) WITH DAMAGE TO NAIL, INITIAL ENCOUNTER: Primary | ICD-10-CM

## 2025-08-21 DIAGNOSIS — Z79.01 LONG TERM CURRENT USE OF ANTICOAGULANT: ICD-10-CM

## 2025-08-21 DIAGNOSIS — I87.2 EDEMA OF BOTH LOWER EXTREMITIES DUE TO PERIPHERAL VENOUS INSUFFICIENCY: ICD-10-CM

## 2025-08-21 DIAGNOSIS — B35.1 ONYCHOMYCOSIS OF TOENAIL: ICD-10-CM

## 2025-08-21 PROCEDURE — 11721 DEBRIDE NAIL 6 OR MORE: CPT | Mod: Q9,S$GLB,, | Performed by: PODIATRIST

## 2025-08-21 PROCEDURE — 1101F PT FALLS ASSESS-DOCD LE1/YR: CPT | Mod: CPTII,S$GLB,, | Performed by: PODIATRIST

## 2025-08-21 PROCEDURE — 1126F AMNT PAIN NOTED NONE PRSNT: CPT | Mod: CPTII,S$GLB,, | Performed by: PODIATRIST

## 2025-08-21 PROCEDURE — 99999 PR PBB SHADOW E&M-EST. PATIENT-LVL III: CPT | Mod: PBBFAC,,, | Performed by: PODIATRIST

## 2025-08-21 PROCEDURE — 3288F FALL RISK ASSESSMENT DOCD: CPT | Mod: CPTII,S$GLB,, | Performed by: PODIATRIST

## 2025-08-21 PROCEDURE — 1157F ADVNC CARE PLAN IN RCRD: CPT | Mod: CPTII,S$GLB,, | Performed by: PODIATRIST

## 2025-08-21 PROCEDURE — 1159F MED LIST DOCD IN RCRD: CPT | Mod: CPTII,S$GLB,, | Performed by: PODIATRIST

## 2025-08-21 PROCEDURE — 99213 OFFICE O/P EST LOW 20 MIN: CPT | Mod: 25,S$GLB,, | Performed by: PODIATRIST

## 2025-09-03 ENCOUNTER — OFFICE VISIT (OUTPATIENT)
Dept: INTERVENTIONAL RADIOLOGY/VASCULAR | Facility: CLINIC | Age: 85
End: 2025-09-03
Payer: MEDICARE

## 2025-09-03 VITALS
BODY MASS INDEX: 17.98 KG/M2 | HEART RATE: 59 BPM | SYSTOLIC BLOOD PRESSURE: 120 MMHG | WEIGHT: 125.31 LBS | DIASTOLIC BLOOD PRESSURE: 68 MMHG

## 2025-09-03 DIAGNOSIS — R33.9 URINARY RETENTION: ICD-10-CM

## 2025-09-03 DIAGNOSIS — R31.9 HEMATURIA, UNSPECIFIED TYPE: ICD-10-CM

## 2025-09-03 DIAGNOSIS — N40.0 ENLARGED PROSTATE: Primary | ICD-10-CM

## 2025-09-03 PROCEDURE — 99204 OFFICE O/P NEW MOD 45 MIN: CPT | Mod: S$GLB,,,

## 2025-09-03 PROCEDURE — 99999 PR PBB SHADOW E&M-EST. PATIENT-LVL I: CPT | Mod: PBBFAC,,,

## 2025-09-03 PROCEDURE — 1157F ADVNC CARE PLAN IN RCRD: CPT | Mod: CPTII,S$GLB,,

## (undated) DEVICE — Device

## (undated) DEVICE — ADAPTER SWIVEL

## (undated) DEVICE — LUBRICANT SURGILUBE 2 OZ

## (undated) DEVICE — SYR SLIP TIP 20CC

## (undated) DEVICE — SYS LABEL CORRECT MED

## (undated) DEVICE — ALCOHOL BLEND 95%

## (undated) DEVICE — DRESSING TRANS 6X8 TEGADERM

## (undated) DEVICE — BANDAGE COFLEX LF2 TAN 3X5YD

## (undated) DEVICE — NDL HYPO REG 25G X 1 1/2

## (undated) DEVICE — NDL ASPIRATING VIZISHOT 20-40M

## (undated) DEVICE — CYTOSPIN COLLECTION FLUID BLT

## (undated) DEVICE — SOL POVIDONE SCRUB IODINE 4 OZ

## (undated) DEVICE — SEE MEDLINE ITEM 157117

## (undated) DEVICE — GAUZE CNFRM STRL 3INX4.1YD

## (undated) DEVICE — GLOVE SENSICARE PI SURG 7.5

## (undated) DEVICE — PENCIL GOLF STERILE

## (undated) DEVICE — BOWL STERILE LARGE 32OZ

## (undated) DEVICE — DRAPE LAP T SHT W/ INSTR PAD

## (undated) DEVICE — GAUZE SPONGE 4X4 12PLY

## (undated) DEVICE — SUT MONOCRYL 3-0 UNDYED RB1

## (undated) DEVICE — CLEANER CAUT TIP STRL 2X2IN

## (undated) DEVICE — NDL VIZISHOT 2 FLEX 22G

## (undated) DEVICE — CONTAINER SPECIMEN STRL 4OZ

## (undated) DEVICE — SYR B-D DISP CONTROL 10CC100/C

## (undated) DEVICE — PACK SET UP CONVERTORS

## (undated) DEVICE — SUT CHROMIC 3-0 SH 27IN GUT

## (undated) DEVICE — SEE MEDLINE ITEM 157131

## (undated) DEVICE — SUT CHROMIC 2-0 SH 27IN BRN

## (undated) DEVICE — CATH BRONCHOSCOPE F/BF

## (undated) DEVICE — ELECTRODE NEEDLE 1IN

## (undated) DEVICE — SYR 10CC LUER LOCK